# Patient Record
Sex: FEMALE | Race: WHITE | NOT HISPANIC OR LATINO | Employment: FULL TIME | URBAN - METROPOLITAN AREA
[De-identification: names, ages, dates, MRNs, and addresses within clinical notes are randomized per-mention and may not be internally consistent; named-entity substitution may affect disease eponyms.]

---

## 2017-09-07 ENCOUNTER — APPOINTMENT (OUTPATIENT)
Dept: LAB | Facility: CLINIC | Age: 57
End: 2017-09-07
Payer: COMMERCIAL

## 2017-09-07 ENCOUNTER — ALLSCRIPTS OFFICE VISIT (OUTPATIENT)
Dept: OTHER | Facility: OTHER | Age: 57
End: 2017-09-07

## 2017-09-07 ENCOUNTER — TRANSCRIBE ORDERS (OUTPATIENT)
Dept: LAB | Facility: CLINIC | Age: 57
End: 2017-09-07

## 2017-09-07 DIAGNOSIS — E03.9 HYPOTHYROIDISM: ICD-10-CM

## 2017-09-07 DIAGNOSIS — Z00.00 ROUTINE GENERAL MEDICAL EXAMINATION AT A HEALTH CARE FACILITY: Primary | ICD-10-CM

## 2017-09-07 LAB — TSH SERPL DL<=0.05 MIU/L-ACNC: 1.79 UIU/ML (ref 0.36–3.74)

## 2017-09-07 PROCEDURE — 84443 ASSAY THYROID STIM HORMONE: CPT

## 2017-09-07 PROCEDURE — 86803 HEPATITIS C AB TEST: CPT

## 2017-09-07 PROCEDURE — 36415 COLL VENOUS BLD VENIPUNCTURE: CPT

## 2017-09-08 LAB — HCV AB SER QL: NORMAL

## 2017-09-10 ENCOUNTER — GENERIC CONVERSION - ENCOUNTER (OUTPATIENT)
Dept: OTHER | Facility: OTHER | Age: 57
End: 2017-09-10

## 2017-11-26 ENCOUNTER — OFFICE VISIT (OUTPATIENT)
Dept: URGENT CARE | Facility: MEDICAL CENTER | Age: 57
End: 2017-11-26
Payer: COMMERCIAL

## 2017-11-26 PROCEDURE — S9083 URGENT CARE CENTER GLOBAL: HCPCS

## 2017-11-26 PROCEDURE — G0382 LEV 3 HOSP TYPE B ED VISIT: HCPCS

## 2017-11-27 NOTE — PROGRESS NOTES
Assessment    1  Subacromial bursitis of left shoulder joint (820 22) (F57 71)    Plan  Subacromial bursitis of left shoulder joint    · PredniSONE 20 MG Oral Tablet; TAKE 3 TABLETS DAILY FOR 3 DAYS, THEN 2TABLETS DAILY FOR 3 DAYS, THEN 1 TABLET DAILY FOR 3 DAYS    Discussion/Summary  Discussion Summary:   History and physical exam suggests left shoulder bursitis  Patient started on prednisone tapering from 60 milligram down to 20 milligrams over 9 days  I recommended patient alternate application of ice which he for 10-15 minutes 2 to 3 times a day  Continue using sling as needed  Periodically perform light stretching exercises and follow-up per primary care provider in 1 week if symptoms persist  She expressed understanding  Medication Side Effects Reviewed: Possible side effects of new medications were reviewed with the patient/guardian today  Understands and agrees with treatment plan: The treatment plan was reviewed with the patient/guardian  The patient/guardian understands and agrees with the treatment plan   Counseling Documentation With Imm: The patient was counseled regarding  Chief Complaint    1  Arm Pain  Chief Complaint Free Text Note Form: Pt states was reaching down to  something from floor 2 days when she felt a pulling sensation in left upper arm  Since then pain when moving left arm  Taking ibuprofen without improvement  History of Present Illness  HPI: Patient is a 65year old female with no significant PMH presenting with left shoulder pain for 3 days  States her left arm was extended while she was bent over reaching for an object when the pain was triggered  Pain is now a 7/10 shooting pain in her left shoulder and down the lateral aspect of her left arm, but not extending past the elbow  States the pain is avoided by keeping her arm still by flexing at the elbow and adducting the arm  Patient has kept the arm in a sling to avoid pain   Warm water from the shower improves the pain, but warm heat exacerbates the pain  Icing the arm and Advil has no effect on the pain  Had a similar episode of left shoulder pain 2-3 weeks ago while extending the arm for an object that relieved in 1 day spontaneously  Denies fevers, chills, redness, swelling, trauma to the arm, history of shoulder injury, history of blood clots, numbness or tingling  Patient has no history of overhead activities  Hospital Based Practices Required Assessment:  Pain Assessment  the patient states they have pain  The pain is located in the left upper arm  (on a scale of 0 to 10, the patient rates the pain at 9 )  Abuse And Domestic Violence Screen   Domestic violence screen not done today  Depression And Suicide Screen  Suicide screen not done today  Prefered Language is  english  Primary Language is  english  Arm Pain:  Associated symptoms include stiffness,-- decreased range of motion,-- weakness-- and-- joint catching, but-- no ecchymosis-- and-- no skin lesions  Manford Fillers presents with complaints of no elbow symptoms  Manford Fillers presents with complaints of no finger symptoms  no forearm symptoms no wrist symptoms      Review of Systems  Focused-Female:  Constitutional: no fever-- and-- no chills  Cardiovascular: no chest pain,-- no intermittent leg claudication-- and-- no lower extremity edema  Gastrointestinal: no nausea,-- no vomiting-- and-- no diarrhea  Musculoskeletal: myalgias-- and-- joint stiffness  Integumentary: no rashes  Neurological: no numbness-- and-- no tingling  Active Problems  1  Asthma (493 90) (J45 909)   2  Encounter for screening colonoscopy (V76 51) (Z12 11)   3  Fatigue (780 79) (R53 83)   4  History of allergy (V15 09) (Z88 9)   5  Hyperlipidemia (272 4) (E78 5)   6  Hypothyroidism (244 9) (E03 9)   7  Pap smear for cervical cancer screening (V76 2) (Z12 4)    Past Medical History  1  History of acute sinusitis (V12 69) (Z87 09)   2   History of dysphagia (V12 79) (Z87 19)   3  History of headache (V13 89) (Z87 898)   4  History of persistent cough (V12 69) (Z87 09)   5  History of Joint Pain In Both Hips (719 45)  Active Problems And Past Medical History Reviewed: The active problems and past medical history were reviewed and updated today  Family History  Mother    1  Family history of Dementia  Father    2  Family history of Aneurysm Of The Left Middle Cerebral Artery   3  Family history of Coronary Artery Disease (V17 49)   4  Family history of Essential Hypertension  Child    5  Family history of Emotional depression  Son    6  Family history of Asthma (V17 5)   7  Family history of Bipolar Disorder NOS  Sister    6  Family history of Asthma (V17 5)   9  Family history of malignant neoplasm of breast (V16 3) (Z80 3)  Brother    8  Family history of Hypertension (V17 49)  Paternal Grandmother    6  Family history of   Maternal Great Grandmother    15  Family history of    15  Family history of malignant neoplasm (V16 9) (Z80 9)  Paternal Grandfather    15  Family history of   Maternal Great Grandfather    13  Family history of    12  Family history of malignant neoplasm (V16 9) (Z80 9)    Social History   · Being A Social Drinker   · Caffeine Use   · Never A Smoker    Surgical History    1  History of Closed Treat Nasal Bone Fx W/ Manipulation & Stabilization   2  History of Dilation And Curettage   3  History of Exploratory Laparotomy   4  History of Extensor Tendon Repair (Each)   5  History of Nasal Septal Deviation Repair    Current Meds   1  Aleve 220 MG Oral Capsule; Take 1 capsule twice daily; Therapy: (Recorded:09Gnr4624) to Recorded   2  Fish Oil OIL; Therapy: (Recorded:64Xas4889) to Recorded   3  Levothyroxine Sodium 50 MCG Oral Tablet; take 1 tablet by mouth daily; Therapy: 23YDH1513 to (Evaluate:02Zfv7996)  Requested for: 68DZT7703; Last Rx:2017 Ordered   4   Levothyroxine Sodium SOLR; Therapy: (Recorded:14Weu7119) to Recorded   5  PriLOSEC OTC 20 MG Oral Tablet Delayed Release; Therapy: (Recorded:10Yha7025) to Recorded   6  Pulmicort Flexhaler 90 MCG/ACT Inhalation Aerosol Powder Breath Activated; INHALE 1 PUFFS Daily; Therapy: (Recorded:40Ldz8513) to Recorded   7  Vitamin D TABS; Take 1 tablet daily; Therapy: (Recorded:55Wds4311) to Recorded   8  ZyrTEC Allergy 10 MG Oral Capsule; take 1 capsule daily; Therapy: (Recorded:89Pgb6680) to Recorded  Medication List Reviewed: The medication list was reviewed and updated today  Allergies    1  Ancef SOLR   2  Penicillins   3  Sulfa Drugs    Vitals  Signs   Recorded: 82IHZ8210 02:33PM   Temperature: 97 1 F  Heart Rate: 68  Respiration: 20  Systolic: 617  Diastolic: 74  O2 Saturation: 99  Pain Scale: 9    Physical Exam   Constitutional  General appearance: No acute distress, well appearing and well nourished  Pulmonary  Respiratory effort: No increased work of breathing or signs of respiratory distress  Auscultation of lungs: Clear to auscultation  Cardiovascular  Auscultation of heart: Normal rate and rhythm, normal S1 and S2, without murmurs  Musculoskeletal  Digits and nails: Normal without clubbing or cyanosis  Inspection/palpation of joints, bones, and muscles: Abnormal  -- Severe decreased ROM of left shoulder, pain with palpation to anterior and posterior shoulder joint, normal ROM of the elbow, no tenderness to biceps or triceps  Skin  Skin and subcutaneous tissue: Normal without rashes or lesions  Neurologic  Sensation: No sensory loss         Future Appointments    Date/Time Provider Specialty Site   12/27/2017 10:00 AM Val Starr MD Family Medicine [facility]   03/08/2018 08:00 AM Val Starr MD Family Medicine [facility] [place] [facility]   Electronically signed by : NELSON Soria ; Nov 26 2017  3:40PM EST                       (Author)

## 2017-12-11 ENCOUNTER — TRANSCRIBE ORDERS (OUTPATIENT)
Dept: ADMINISTRATIVE | Facility: HOSPITAL | Age: 57
End: 2017-12-11

## 2017-12-11 DIAGNOSIS — Z12.31 VISIT FOR SCREENING MAMMOGRAM: Primary | ICD-10-CM

## 2018-01-11 ENCOUNTER — HOSPITAL ENCOUNTER (OUTPATIENT)
Dept: MAMMOGRAPHY | Facility: MEDICAL CENTER | Age: 58
Discharge: HOME | End: 2018-01-11
Payer: COMMERCIAL

## 2018-01-11 ENCOUNTER — GENERIC CONVERSION - ENCOUNTER (OUTPATIENT)
Dept: OTHER | Facility: OTHER | Age: 58
End: 2018-01-11

## 2018-01-11 DIAGNOSIS — Z12.31 VISIT FOR SCREENING MAMMOGRAM: ICD-10-CM

## 2018-01-11 PROCEDURE — 77067 SCR MAMMO BI INCL CAD: CPT

## 2018-01-13 NOTE — PROGRESS NOTES
Assessment    1  Encounter for preventive health examination (V70 0) (Z00 00)   2  Hypothyroidism (244 9) (E03 9)   3  Asthma (493 90) (J45 909)   4  Hyperlipidemia (272 4) (E78 5)    Plan  Health Maintenance    · (Q) HEPATITIS C ANTIBODY; Status:Active; Requested [facility]:[id number];   Hypothyroidism    · (1) TSH WITH FT4 REFLEX; Status:Resulted - Requires Verification;   Done: 27CFF7423  02:08PM    Discussion/Summary  health maintenance visit Currently, she eats a healthy diet and has an adequate exercise regimen  cervical cancer screening is current Breast cancer screening: mammogram is current  Colorectal cancer screening: colorectal cancer screening is current  Hepatitis C Screening:  The patient agrees to Hepatitis C screening  Adithya Urbano is here for initial visit and health maintenance physical exam  She has been quite healthy and has been doing much better ever since her right hip replacement May 2016  She is treated for hypothyroidism and I've given her a lab slip to check a TSH  She has a history of hyperlipidemia, but luckily she has an elevated HDL cholesterol as well  She had blood work in February 2017, and we will try to obtain these results  She has chronic allergic rhinitis and exercise-induced asthma  She we'll continue her follow-up with the allergist and use of her Pulmicort and Zyrtec  She is up-to-date with health maintenance exams  She refuses flu shot because she has had a history of severe allergic reactions to various antibiotics  We'll plan to recheck in 6 months  Chief Complaint  new pt physcial      History of Present Illness  HM, Adult Female: The patient is being seen for a health maintenance evaluation  The last health maintenance visit was 1 year(s) ago  General Health: The patient's health since the last visit is described as good  She has regular dental visits  She complains of vision problems  She denies hearing loss  Screening:   HPI: Pt is here as new patient   I used to see her in another office several years ago  PMH mildly elevated but high HDL 77  Hypothyroidism diagnosed Feb 2016, on 50 mcgs daily  Normal labs Feb 2017  Multiple seasonal allergies, worse spring and fall  Takes Zyrtec all year long  Dr Yocasta Cooper is her allergist  She chronic allergic rhinitis and exercise induced asthma  Pulmicort bid and albuterol before exercise  Lots of stress at work not knowing if her job will remain  She is taking Prilosec OTC now due to work stress  past 2 weeks feels uncomfortable left lower quadrant after BM  No blood in stool  Colonoscopy 4/15 had polyps removed due for next one 2018   1 son age 21  Right hip replacement 5/16, Dr Francisco Moore  Hx of injury to right hip  Lap and d and c 1992,  deviated septum repair 1983  left 5th finger volleyball injury to distal tendon 1989           Review of Systems    Constitutional: No fever, no chills, feels well, no tiredness, no recent weight gain or weight loss  Eyes: No complaints of eye pain, no red eyes, no eyesight problems, no discharge, no dry eyes, no itching of eyes  ENT: no complaints of earache, no loss of hearing, no nose bleeds, no nasal discharge, no sore throat, no hoarseness  Cardiovascular: No complaints of slow heart rate, no fast heart rate, no chest pain, no palpitations, no leg claudication, no lower extremity edema  Respiratory: cough, but no shortness of breath and no wheezing  Gastrointestinal: as noted in HPI  Genitourinary: No complaints of dysuria, no incontinence, no pelvic pain, no dysmenorrhea, no vaginal discharge or bleeding  Musculoskeletal: No complaints of arthralgias, no myalgias, no joint swelling or stiffness, no limb pain or swelling  Integumentary: No complaints of skin rash or lesions, no itching, no skin wounds, no breast pain or lump     Neurological: No complaints of headache, no confusion, no convulsions, no numbness, no dizziness or fainting, no tingling, no limb weakness, no difficulty walking  Psychiatric: Not suicidal, no sleep disturbance, no anxiety or depression, no change in personality, no emotional problems  Endocrine: hot flashes and menoapuse approx 1 1/2 yrs ago, rare hot flashes  Hematologic/Lymphatic: No complaints of swollen glands, no swollen glands in the neck, does not bleed easily, does not bruise easily  Active Problems    1  Asthma (493 90) (J45 909)   2  Fatigue (780 79) (R53 83)   3  History of allergy (V15 09) (Z88 9)   4   Hyperlipidemia (272 4) (E78 5)    Past Medical History    · History of acute sinusitis (V12 69) (Z87 09)   · History of dysphagia (V12 79) (Z87 19)   · History of headache (V13 89) (Z87 898)   · History of persistent cough (V12 69) (Z87 09)   · History of Joint Pain In Both Hips (719 45)    Surgical History    · History of Closed Treat Nasal Bone Fx W/ Manipulation & Stabilization   · History of Dilation And Curettage   · History of Exploratory Laparotomy   · History of Extensor Tendon Repair (Each)   · History of Nasal Septal Deviation Repair    Family History  Mother    · Family history of Dementia  Father    · Family history of Aneurysm Of The Left Middle Cerebral Artery   · Family history of Coronary Artery Disease (V17 49)   · Family history of Essential Hypertension  Child    · Family history of Emotional depression  Son    · Family history of Asthma (V17 5)   · Family history of Bipolar Disorder NOS  Sister    · Family history of Asthma (V17 5)   · Family history of malignant neoplasm of breast (V16 3) (Z80 3)  Brother    · Family history of Hypertension (V17 49)  Paternal Grandmother    · Family history of   Maternal Great Grandmother    · Family history of    · Family history of malignant neoplasm (V16 9) (Z80 9)  Paternal Grandfather    · Family history of   Maternal Great Grandfather    · Family history of    · Family history of malignant neoplasm (V16 9) (Z80 9)    Social History    · Being A Social Drinker   · Caffeine Use   · 1 20 OZ OF COFFEE   · Never A Smoker    Current Meds   1  Aleve 220 MG Oral Capsule; Take 1 capsule twice daily; Therapy: (Recorded:29Jul2013) to Recorded   2  Fish Oil OIL; Therapy: (Recorded:07Sep2017) to Recorded   3  Levothyroxine Sodium SOLR;   Therapy: (Recorded:07Sep2017) to Recorded   4  PriLOSEC OTC 20 MG Oral Tablet Delayed Release; Therapy: (Recorded:07Sep2017) to Recorded   5  Pulmicort Flexhaler 90 MCG/ACT Inhalation Aerosol Powder Breath Activated; INHALE 1   PUFFS Daily; Therapy: (Recorded:29Jul2013) to Recorded   6  Vitamin D TABS; Take 1 tablet daily; Therapy: (Recorded:29Jul2013) to Recorded   7  ZyrTEC Allergy 10 MG Oral Capsule; take 1 capsule daily; Therapy: (Recorded:29Jul2013) to Recorded    Allergies    1  Ancef SOLR   2  Penicillins   3  Sulfa Drugs    Vitals   Recorded: 07Sep2017 12:51PM   Heart Rate 92   Systolic 114   Diastolic 80   Height 5 ft 6 in   Weight 167 lb 0 8 oz   BMI Calculated 26 96   BSA Calculated 1 85     Physical Exam    Constitutional   General appearance: No acute distress, well appearing and well nourished  Head and Face   Head and face: Normal     Eyes   Conjunctiva and lids: No swelling, erythema or discharge  Ears, Nose, Mouth, and Throat   External inspection of ears and nose: Normal     Otoscopic examination: Tympanic membranes translucent with normal light reflex  Canals patent without erythema  Oropharynx: Normal with no erythema, edema, exudate or lesions  Neck   Neck: Supple, symmetric, trachea midline, no masses  Thyroid: Normal, no thyromegaly  Pulmonary   Respiratory effort: No increased work of breathing or signs of respiratory distress  Auscultation of lungs: Clear to auscultation  Cardiovascular   Auscultation of heart: Normal rate and rhythm, normal S1 and S2, no murmurs  Carotid pulses: 2+ bilaterally  Abdomen   Abdomen: Non-tender, no masses      Liver and spleen: No hepatomegaly or splenomegaly  Musculoskeletal   Gait and station: Normal     Skin   Skin and subcutaneous tissue: Normal without rashes or lesions  Neurologic   Cranial nerves: Cranial nerves II-XII intact  Cortical function: Normal mental status  Reflexes: 2+ and symmetric  Results/Data  (1) TSH WITH FT4 REFLEX 07Sep2017 02:08PM Osmin Wagner    Order Number: [id number]     Test Name Result Flag Reference   TSH 1 790 uIU/mL  0 358-3 740   Patients undergoing fluorescein dye angiography may retain small amounts of fluorescein in the body for 48-72 hours post procedure  Samples containing fluorescein can produce falsely depressed TSH values  If the patient had this procedure,a specimen should be resubmitted post fluorescein clearance  The recommended reference ranges for TSH during pregnancy are as follows:  First trimester 0 1 to 2 5 uIU/mL  Second trimester  0 2 to 3 0 uIU/mL  Third trimester 0 3 to 3 0 uIU/m     PHQ-2 Adult Depression Screening 07Sep2017 01:01PM Madie, Lala     Test Name Result Flag Reference   PHQ-2 Adult Depression Score 0     Over the last two weeks, how often have you been bothered by any of the following problems?   Little interest or pleasure in doing things: Not at all - 0  Feeling down, depressed, or hopeless: Not at all - 0   PHQ-2 Adult Depression Screening Negative         Future Appointments    Date/Time Provider Specialty Site   12/27/2017 10:00 AM Osmin Wagner MD Family Medicine [facility] 876   03/08/2018 08:00 AM Osmin Wagner MD Family Medicine [facility] 19   Electronically signed by : Rashida Sifuentes MD; Sep  7 2017  5:49PM EST                       (Author)

## 2018-01-16 NOTE — RESULT NOTES
Verified Results  (1) TSH WITH FT4 REFLEX 73Msd1612 02:08PM Kiarra Perdue    Order Number: [id number]     Test Name Result Flag Reference   TSH 1 790 uIU/mL  0 358-3 740   Patients undergoing fluorescein dye angiography may retain small amounts of fluorescein in the body for 48-72 hours post procedure  Samples containing fluorescein can produce falsely depressed TSH values  If the patient had this procedure,a specimen should be resubmitted post fluorescein clearance            The recommended reference ranges for TSH during pregnancy are as follows:  First trimester 0 1 to 2 5 uIU/mL  Second trimester  0 2 to 3 0 uIU/mL  Third trimester 0 3 to 3 0 uIU/m

## 2018-01-22 VITALS
WEIGHT: 167.05 LBS | HEART RATE: 92 BPM | HEIGHT: 66 IN | DIASTOLIC BLOOD PRESSURE: 80 MMHG | SYSTOLIC BLOOD PRESSURE: 100 MMHG | BODY MASS INDEX: 26.85 KG/M2

## 2018-01-30 DIAGNOSIS — E03.9 HYPOTHYROIDISM, UNSPECIFIED TYPE: Primary | ICD-10-CM

## 2018-01-30 RX ORDER — LEVOTHYROXINE SODIUM 0.05 MG/1
50 TABLET ORAL DAILY
Qty: 30 TABLET | Refills: 0 | Status: SHIPPED | OUTPATIENT
Start: 2018-01-30 | End: 2018-02-07 | Stop reason: SDUPTHER

## 2018-02-07 ENCOUNTER — OFFICE VISIT (OUTPATIENT)
Dept: FAMILY MEDICINE CLINIC | Facility: CLINIC | Age: 58
End: 2018-02-07
Payer: COMMERCIAL

## 2018-02-07 VITALS
RESPIRATION RATE: 18 BRPM | BODY MASS INDEX: 27.97 KG/M2 | WEIGHT: 174 LBS | HEART RATE: 74 BPM | SYSTOLIC BLOOD PRESSURE: 100 MMHG | DIASTOLIC BLOOD PRESSURE: 62 MMHG | HEIGHT: 66 IN | OXYGEN SATURATION: 97 %

## 2018-02-07 DIAGNOSIS — E78.01 FAMILIAL HYPERCHOLESTEROLEMIA: ICD-10-CM

## 2018-02-07 DIAGNOSIS — E03.9 HYPOTHYROIDISM, UNSPECIFIED TYPE: Primary | ICD-10-CM

## 2018-02-07 DIAGNOSIS — J45.30 MILD PERSISTENT ASTHMA WITHOUT COMPLICATION: ICD-10-CM

## 2018-02-07 DIAGNOSIS — K21.9 GERD WITHOUT ESOPHAGITIS: ICD-10-CM

## 2018-02-07 PROBLEM — M75.52 SUBACROMIAL BURSITIS OF LEFT SHOULDER JOINT: Status: ACTIVE | Noted: 2017-11-26

## 2018-02-07 PROCEDURE — 99214 OFFICE O/P EST MOD 30 MIN: CPT | Performed by: FAMILY MEDICINE

## 2018-02-07 RX ORDER — LEVOTHYROXINE SODIUM 0.05 MG/1
50 TABLET ORAL DAILY
Qty: 30 TABLET | Refills: 0 | Status: SHIPPED | OUTPATIENT
Start: 2018-02-07 | End: 2018-03-05 | Stop reason: SDUPTHER

## 2018-02-07 RX ORDER — ALBUTEROL SULFATE 1.25 MG/3ML
1 SOLUTION RESPIRATORY (INHALATION) EVERY 6 HOURS
COMMUNITY
End: 2018-03-05 | Stop reason: SDUPTHER

## 2018-02-07 NOTE — PROGRESS NOTES
Assessment/Plan:    Hyperlipidemia    Patient has a history of mildly elevated cholesterol but she also has elevated HDL cholesterol  We will send her for fasting labs as I do not have any in the old records  Hypothyroidism    Hypothyroidism has been stable  We will send her for TSH and free T4 reflex  GERD without esophagitis    She has history of discomfort in the upper abdomen which seems to be associated with stress  She has been taking daily omeprazole  We discussed having her decrease to as needed omeprazole or switching to 1 of the H2 blockers over-the-counter such as Zantac or Pepcid  Asthma    Asthma has been stable on her  Chronic inhaler  She will continue with yearly visits to her allergist        Diagnoses and all orders for this visit:    Hypothyroidism, unspecified type  -     levothyroxine 50 mcg tablet; Take 1 tablet (50 mcg total) by mouth daily  -     TSH, 3rd generation with T4 reflex; Future    Familial hypercholesterolemia  -     Lipid panel; Future  -     Comprehensive metabolic panel; Future    GERD without esophagitis    Mild persistent asthma without complication    Other orders  -     Discontinue: aspirin 81 MG tablet; Take 81 mg by mouth  -     PROAIR  (90 Base) MCG/ACT inhaler;   -     albuterol (ACCUNEB) 1 25 MG/3ML nebulizer solution; Inhale 1 ampule every 6 (six) hours          Subjective:   Chief Complaint   Patient presents with    Follow-up     6Month        Patient ID: Sandra Friend is a 65 y o  female  Diana Renee is here for routine follow-up visit  She feels stable on her current dose of Synthroid  She tends to get acid stomach symptoms with recurrent pain in the upper abdomen  but she does not have heartburn  or reflux  She notes the symptoms are related to stress, and when she was on vacation recently she did not need any medication    She has been taking daily omeprazole which works,   But she is concerned about remaining on it on a daily basis     Her allergies and asthma have  Been under excellent control  She has not needed rescue inhaler recently  She continues with yearly trips to the allergist           The following portions of the patient's history were reviewed and updated as appropriate: allergies, current medications, past family history, past medical history, past social history, past surgical history and problem list     Review of Systems   Constitutional: Negative for activity change, chills, fatigue and fever  HENT: Negative for congestion, ear pain, sinus pressure and sore throat  Eyes: Negative for pain and visual disturbance  Respiratory: Negative for cough, chest tightness, shortness of breath and wheezing  Cardiovascular: Negative for chest pain, palpitations and leg swelling  Gastrointestinal: Negative for abdominal pain, blood in stool, constipation, diarrhea, nausea and vomiting  Upper abd discomfort    Endocrine: Negative for polydipsia and polyuria  Genitourinary: Negative for difficulty urinating, dysuria, frequency and urgency  Musculoskeletal: Negative for arthralgias, joint swelling and myalgias  Skin: Negative for rash  Neurological: Negative for dizziness, weakness, numbness and headaches  Hematological: Negative for adenopathy  Does not bruise/bleed easily  Psychiatric/Behavioral: Negative for dysphoric mood  The patient is not nervous/anxious  Objective:     Physical Exam   Constitutional: She is oriented to person, place, and time  She appears well-developed and well-nourished  No distress  HENT:   Head: Normocephalic and atraumatic  Right Ear: External ear normal    Left Ear: External ear normal    Nose: Nose normal    Mouth/Throat: Oropharynx is clear and moist    Eyes: Conjunctivae and EOM are normal  Pupils are equal, round, and reactive to light  No scleral icterus  Neck: Normal range of motion  Neck supple  No thyromegaly present     Cardiovascular: Normal rate, regular rhythm and normal heart sounds  No murmur heard  Pulmonary/Chest: Effort normal and breath sounds normal  She has no wheezes  She has no rales  Abdominal: Soft  Bowel sounds are normal  She exhibits no mass  There is no tenderness  Musculoskeletal: She exhibits no tenderness or deformity  Lymphadenopathy:     She has no cervical adenopathy  Neurological: She is alert and oriented to person, place, and time  She has normal reflexes  No cranial nerve deficit  Skin: Skin is warm and dry  No rash noted  No erythema  Psychiatric: She has a normal mood and affect  Her behavior is normal    Nursing note and vitals reviewed

## 2018-02-07 NOTE — ASSESSMENT & PLAN NOTE
Patient has a history of mildly elevated cholesterol but she also has elevated HDL cholesterol  We will send her for fasting labs as I do not have any in the old records

## 2018-02-07 NOTE — ASSESSMENT & PLAN NOTE
Asthma has been stable on her  Chronic inhaler    She will continue with yearly visits to her allergist

## 2018-02-14 ENCOUNTER — APPOINTMENT (OUTPATIENT)
Dept: LAB | Facility: CLINIC | Age: 58
End: 2018-02-14
Payer: COMMERCIAL

## 2018-02-14 ENCOUNTER — TRANSCRIBE ORDERS (OUTPATIENT)
Dept: LAB | Facility: CLINIC | Age: 58
End: 2018-02-14

## 2018-02-14 DIAGNOSIS — E03.9 HYPOTHYROIDISM, UNSPECIFIED TYPE: ICD-10-CM

## 2018-02-14 DIAGNOSIS — E78.01 FAMILIAL HYPERCHOLESTEROLEMIA: ICD-10-CM

## 2018-02-14 LAB
ALBUMIN SERPL BCP-MCNC: 4 G/DL (ref 3.5–5)
ALP SERPL-CCNC: 91 U/L (ref 46–116)
ALT SERPL W P-5'-P-CCNC: 39 U/L (ref 12–78)
ANION GAP SERPL CALCULATED.3IONS-SCNC: 5 MMOL/L (ref 4–13)
AST SERPL W P-5'-P-CCNC: 20 U/L (ref 5–45)
BILIRUB SERPL-MCNC: 0.72 MG/DL (ref 0.2–1)
BUN SERPL-MCNC: 10 MG/DL (ref 5–25)
CALCIUM SERPL-MCNC: 8.6 MG/DL (ref 8.3–10.1)
CHLORIDE SERPL-SCNC: 107 MMOL/L (ref 100–108)
CHOLEST SERPL-MCNC: 204 MG/DL (ref 50–200)
CO2 SERPL-SCNC: 30 MMOL/L (ref 21–32)
CREAT SERPL-MCNC: 0.64 MG/DL (ref 0.6–1.3)
GFR SERPL CREATININE-BSD FRML MDRD: 100 ML/MIN/1.73SQ M
GLUCOSE P FAST SERPL-MCNC: 93 MG/DL (ref 65–99)
HDLC SERPL-MCNC: 67 MG/DL (ref 40–60)
LDLC SERPL CALC-MCNC: 129 MG/DL (ref 0–100)
POTASSIUM SERPL-SCNC: 4.3 MMOL/L (ref 3.5–5.3)
PROT SERPL-MCNC: 7.4 G/DL (ref 6.4–8.2)
SODIUM SERPL-SCNC: 142 MMOL/L (ref 136–145)
TRIGL SERPL-MCNC: 38 MG/DL
TSH SERPL DL<=0.05 MIU/L-ACNC: 1.19 UIU/ML (ref 0.36–3.74)

## 2018-02-14 PROCEDURE — 80061 LIPID PANEL: CPT

## 2018-02-14 PROCEDURE — 36415 COLL VENOUS BLD VENIPUNCTURE: CPT

## 2018-02-14 PROCEDURE — 84443 ASSAY THYROID STIM HORMONE: CPT

## 2018-02-14 PROCEDURE — 80053 COMPREHEN METABOLIC PANEL: CPT

## 2018-03-03 ENCOUNTER — OFFICE VISIT (OUTPATIENT)
Dept: URGENT CARE | Facility: CLINIC | Age: 58
End: 2018-03-03
Payer: COMMERCIAL

## 2018-03-03 ENCOUNTER — APPOINTMENT (OUTPATIENT)
Dept: RADIOLOGY | Facility: CLINIC | Age: 58
End: 2018-03-03
Payer: COMMERCIAL

## 2018-03-03 VITALS
DIASTOLIC BLOOD PRESSURE: 74 MMHG | HEIGHT: 66 IN | WEIGHT: 170 LBS | HEART RATE: 70 BPM | TEMPERATURE: 99.5 F | BODY MASS INDEX: 27.32 KG/M2 | OXYGEN SATURATION: 96 % | SYSTOLIC BLOOD PRESSURE: 108 MMHG

## 2018-03-03 DIAGNOSIS — R68.89 FLU-LIKE SYMPTOMS: Primary | ICD-10-CM

## 2018-03-03 PROCEDURE — 71046 X-RAY EXAM CHEST 2 VIEWS: CPT

## 2018-03-03 PROCEDURE — 99203 OFFICE O/P NEW LOW 30 MIN: CPT

## 2018-03-03 RX ORDER — OSELTAMIVIR PHOSPHATE 75 MG/1
75 CAPSULE ORAL 2 TIMES DAILY
Qty: 10 CAPSULE | Refills: 0 | Status: SHIPPED | OUTPATIENT
Start: 2018-03-03 | End: 2018-03-08

## 2018-03-03 RX ORDER — METHYLPREDNISOLONE 4 MG/1
TABLET ORAL
Qty: 21 TABLET | Refills: 0 | Status: SHIPPED | OUTPATIENT
Start: 2018-03-03 | End: 2018-03-08

## 2018-03-05 DIAGNOSIS — E03.9 HYPOTHYROIDISM, UNSPECIFIED TYPE: ICD-10-CM

## 2018-03-05 DIAGNOSIS — J45.20 MILD INTERMITTENT ASTHMA WITHOUT COMPLICATION: Primary | ICD-10-CM

## 2018-03-05 RX ORDER — ALBUTEROL SULFATE 1.25 MG/3ML
SOLUTION RESPIRATORY (INHALATION)
Qty: 75 ML | Refills: 3 | Status: SHIPPED | OUTPATIENT
Start: 2018-03-05 | End: 2019-03-20 | Stop reason: SDUPTHER

## 2018-03-05 RX ORDER — LEVOTHYROXINE SODIUM 0.05 MG/1
50 TABLET ORAL DAILY
Qty: 30 TABLET | Refills: 5 | Status: SHIPPED | OUTPATIENT
Start: 2018-03-05 | End: 2018-09-12 | Stop reason: SDUPTHER

## 2018-03-05 NOTE — PROGRESS NOTES
[facility]        NAME: Loida Thurman is a 65 y o  female  : 1960    MRN: [id number]  DATE: 2018  TIME: 9:00 AM    Assessment and Plan   Flu-like symptoms [R68 89]  1  Flu-like symptoms  X-ray chest 2 views    oseltamivir (TAMIFLU) 75 mg capsule    Methylprednisolone 4 MG TBPK         Patient Instructions     Take tamiflu and steroid as prescribed  Follow up with PCP in 3-5 days  Proceed to  ER if symptoms worsen  Chief Complaint     Chief Complaint   Patient presents with    Cough     x3-4 days      History of Present Illness       Cough   This is a new problem  The current episode started yesterday  The problem has been unchanged  The problem occurs constantly  The cough is non-productive  Associated symptoms include a fever, myalgias, nasal congestion, postnasal drip, rhinorrhea and a sore throat  Pertinent negatives include no chest pain, chills, ear congestion, ear pain, headaches, heartburn, hemoptysis, rash, shortness of breath, sweats, weight loss or wheezing  Nothing aggravates the symptoms  She has tried nothing for the symptoms  The treatment provided mild relief  There is no history of asthma, bronchiectasis, bronchitis, COPD, emphysema, environmental allergies or pneumonia  Review of Systems   Review of Systems   Constitutional: Positive for fatigue and fever  Negative for activity change, appetite change, chills, diaphoresis, unexpected weight change and weight loss  HENT: Positive for congestion, postnasal drip, rhinorrhea, sinus pain, sinus pressure, sneezing and sore throat  Negative for dental problem, drooling, ear discharge, ear pain, facial swelling, hearing loss, mouth sores, nosebleeds, tinnitus, trouble swallowing and voice change  Eyes: Negative  Respiratory: Positive for cough and chest tightness  Negative for apnea, hemoptysis, choking, shortness of breath, wheezing and stridor      Cardiovascular: Negative for chest pain, palpitations and leg swelling  Gastrointestinal: Negative for heartburn  Musculoskeletal: Positive for myalgias  Skin: Negative for rash  Allergic/Immunologic: Negative for environmental allergies  Neurological: Negative for headaches           Current Medications       Current Outpatient Prescriptions:     albuterol (ACCUNEB) 1 25 MG/3ML nebulizer solution, Inhale 1 ampule every 6 (six) hours, Disp: , Rfl:     budesonide (PULMICORT FLEXHALER) 90 MCG/ACT inhaler, Inhale 1 puff daily, Disp: , Rfl:     Cetirizine HCl (ZYRTEC ALLERGY) 10 MG CAPS, Take 1 capsule by mouth daily, Disp: , Rfl:     cholecalciferol (VITAMIN D3) 1,000 units tablet, Take 1 tablet by mouth daily, Disp: , Rfl:     FISH OIL-KRILL OIL PO, by Does not apply route, Disp: , Rfl:     levothyroxine 50 mcg tablet, Take 1 tablet (50 mcg total) by mouth daily, Disp: 30 tablet, Rfl: 0    omeprazole (PRILOSEC OTC) 20 MG tablet, Take by mouth, Disp: , Rfl:     PROAIR  (90 Base) MCG/ACT inhaler, , Disp: , Rfl:     Methylprednisolone 4 MG TBPK, Use as directed on package, Disp: 21 tablet, Rfl: 0    oseltamivir (TAMIFLU) 75 mg capsule, Take 1 capsule (75 mg total) by mouth 2 (two) times a day for 5 days, Disp: 10 capsule, Rfl: 0    Current Allergies     Allergies as of 03/03/2018 - Reviewed 03/03/2018   Allergen Reaction Noted    Cefazolin  09/23/2013    Penicillins  07/29/2013    Sulfa antibiotics  07/29/2013            The following portions of the patient's history were reviewed and updated as appropriate: allergies, current medications, past family history, past medical history, past social history, past surgical history and problem list      Past Medical History:   Diagnosis Date    History of acute sinusitis     History of dysphagia     History of headache     new onset    History of persistent cough     Pain of both hip joints        Past Surgical History:   Procedure Laterality Date    DILATION AND CURETTAGE OF UTERUS      EXPLORATORY LAPAROTOMY      NASAL SEPTUM SURGERY      Nasal Septal Deviation Repair    NOSE SURGERY      Closed Treat Nasal Bone Fx w/ Manipulation Stabilization    REPAIR EXTENSOR TENDON HAND         Family History   Problem Relation Age of Onset    Dementia Mother     Aneurysm Father      of the Left Middle Cerebral Artery    Coronary artery disease Father     Hypertension Father     Asthma Sister     Breast cancer Sister     Hypertension Brother     Asthma Son 23    Bipolar disorder Son      NOS    Cancer Family     Depression Child      Emotional Depression         Medications have been verified  Objective   /74 (BP Location: Left arm, Patient Position: Sitting, Cuff Size: Adult)   Pulse 70   Temp 99 5 °F (37 5 °C) (Tympanic)   Ht 5' 6" (1 676 m)   Wt 77 1 kg (170 lb)   SpO2 96%   BMI 27 44 kg/m²        Physical Exam     Physical Exam   Constitutional: She appears well-developed and well-nourished  She has a sickly appearance  HENT:   Head: Normocephalic  Right Ear: Hearing, tympanic membrane, external ear and ear canal normal    Left Ear: Hearing, tympanic membrane, external ear and ear canal normal    Nose: Mucosal edema and rhinorrhea (clear) present  Mouth/Throat: Posterior oropharyngeal erythema present  No oropharyngeal exudate or posterior oropharyngeal edema  Cardiovascular: Normal rate, regular rhythm and intact distal pulses  Exam reveals no gallop and no friction rub  No murmur heard  Pulmonary/Chest: Effort normal and breath sounds normal  No respiratory distress  She has no wheezes  She has no rales  She exhibits no tenderness  Abdominal: Soft  Bowel sounds are normal  She exhibits no distension and no mass  There is no tenderness  There is no rebound and no guarding  Lymphadenopathy:     She has cervical adenopathy  Right cervical: Superficial cervical adenopathy present  Left cervical: Superficial cervical adenopathy present

## 2018-03-08 ENCOUNTER — OFFICE VISIT (OUTPATIENT)
Dept: FAMILY MEDICINE CLINIC | Facility: CLINIC | Age: 58
End: 2018-03-08
Payer: COMMERCIAL

## 2018-03-08 VITALS
TEMPERATURE: 99.2 F | SYSTOLIC BLOOD PRESSURE: 114 MMHG | HEART RATE: 88 BPM | BODY MASS INDEX: 27.32 KG/M2 | WEIGHT: 170 LBS | HEIGHT: 66 IN | DIASTOLIC BLOOD PRESSURE: 80 MMHG | OXYGEN SATURATION: 93 %

## 2018-03-08 DIAGNOSIS — J40 BRONCHITIS: Primary | ICD-10-CM

## 2018-03-08 PROCEDURE — 99213 OFFICE O/P EST LOW 20 MIN: CPT | Performed by: FAMILY MEDICINE

## 2018-03-08 RX ORDER — BENZONATATE 200 MG/1
200 CAPSULE ORAL 3 TIMES DAILY PRN
Qty: 20 CAPSULE | Refills: 0 | Status: SHIPPED | OUTPATIENT
Start: 2018-03-08 | End: 2018-03-15

## 2018-03-08 RX ORDER — AZITHROMYCIN 500 MG/1
TABLET, FILM COATED ORAL
Refills: 1 | COMMUNITY
Start: 2018-02-12 | End: 2018-03-08

## 2018-03-08 RX ORDER — CLINDAMYCIN HYDROCHLORIDE 150 MG/1
CAPSULE ORAL
Refills: 0 | COMMUNITY
Start: 2018-02-12 | End: 2018-03-08

## 2018-03-08 RX ORDER — AZITHROMYCIN 250 MG/1
TABLET, FILM COATED ORAL
Qty: 6 TABLET | Refills: 0 | Status: SHIPPED | OUTPATIENT
Start: 2018-03-08 | End: 2018-03-12

## 2018-03-08 NOTE — PATIENT INSTRUCTIONS
Chantal Gilliland is here with persistent bronchitis symptoms  She already has had a prednisone taper which has not helped eradicating the symptoms  I have given her prescription for a Z-Charly and Tessalon Perles which she may use up to 3 times per day  Also recommended increased fluid intake  She should contact us if symptoms are not improving

## 2018-03-08 NOTE — PROGRESS NOTES
Assessment/Plan:      Bee Cowan is here with persistent bronchitis symptoms  She already has had a prednisone taper which has not helped eradicating the symptoms  I have given her prescription for a Z-Charly and Tessalon Perles which she may use up to 3 times per day  Also recommended increased fluid intake  She should contact us if symptoms are not improving  No problem-specific Assessment & Plan notes found for this encounter  Diagnoses and all orders for this visit:    Bronchitis    Other orders  -     Discontinue: clindamycin (CLEOCIN) 150 mg capsule; TK 1 C PO Q 8 H TAT  -     Discontinue: azithromycin (ZITHROMAX) 500 MG tablet; TK 1 T PO 1 HOUR PRIOR TO APPOINTMENT          Subjective:   Chief Complaint   Patient presents with    Cold Like Symptoms        Patient ID: Munira Oseguera is a 65 y o  female  She has been coughing for over a week  The cough has been sometimes dry and sometimes productive  It has been keeping her awake at night  She went to urgent care on March 3rd and was given steroid taper  She has also been using neb treatments    Unfortunately symptoms are not improving  She did have chest x-ray at urgent care which was normal       Cough   This is a new problem  The current episode started 1 to 4 weeks ago  The problem has been gradually worsening  Associated symptoms include chest pain  Pertinent negatives include no chills, ear pain, fever, myalgias, rash, sore throat, shortness of breath or wheezing  She has tried oral steroids for the symptoms  The treatment provided no relief  Her past medical history is significant for asthma  The following portions of the patient's history were reviewed and updated as appropriate: allergies, current medications, past family history, past medical history, past social history, past surgical history and problem list     Review of Systems   Constitutional: Positive for fatigue  Negative for activity change, chills and fever     HENT: Negative for congestion, ear pain, sinus pressure and sore throat  Eyes: Negative for pain and visual disturbance  Respiratory: Positive for cough  Negative for chest tightness, shortness of breath and wheezing  Cardiovascular: Positive for chest pain  Negative for palpitations and leg swelling  Gastrointestinal: Negative for abdominal pain, blood in stool, constipation, diarrhea, nausea and vomiting  Endocrine: Negative for polydipsia and polyuria  Genitourinary: Negative for difficulty urinating, dysuria, frequency and urgency  Musculoskeletal: Negative for arthralgias, joint swelling and myalgias  Skin: Negative for rash  Neurological: Negative for dizziness, weakness and numbness  Hematological: Negative for adenopathy  Does not bruise/bleed easily  Psychiatric/Behavioral: Negative for dysphoric mood  The patient is not nervous/anxious  Objective:      /80 (BP Location: Left arm, Patient Position: Sitting, Cuff Size: Large)   Pulse 88   Temp 99 2 °F (37 3 °C)   Ht 5' 6" (1 676 m)   Wt 77 1 kg (170 lb)   SpO2 93%   BMI 27 44 kg/m²          Physical Exam   Nursing note and vitals reviewed

## 2018-03-08 NOTE — LETTER
March 8, 2018     Patient: Jourdan Ariza   YOB: 1960   Date of Visit: 3/8/2018       To Whom it May Concern:    Agustin Sweeney is under my professional care  She was seen in my office on 3/8/2018  She may return to work on 3/12/2018  If you have any questions or concerns, please don't hesitate to call           Sincerely,          Celestine Rivera MD        CC: No Recipients

## 2018-03-15 ENCOUNTER — OFFICE VISIT (OUTPATIENT)
Dept: FAMILY MEDICINE CLINIC | Facility: CLINIC | Age: 58
End: 2018-03-15
Payer: COMMERCIAL

## 2018-03-15 VITALS
TEMPERATURE: 98.1 F | HEIGHT: 66 IN | DIASTOLIC BLOOD PRESSURE: 66 MMHG | SYSTOLIC BLOOD PRESSURE: 100 MMHG | OXYGEN SATURATION: 96 % | HEART RATE: 83 BPM | WEIGHT: 171.6 LBS | BODY MASS INDEX: 27.58 KG/M2

## 2018-03-15 DIAGNOSIS — R05.3 COUGH, PERSISTENT: Primary | ICD-10-CM

## 2018-03-15 PROCEDURE — 3008F BODY MASS INDEX DOCD: CPT | Performed by: FAMILY MEDICINE

## 2018-03-15 PROCEDURE — 99213 OFFICE O/P EST LOW 20 MIN: CPT | Performed by: FAMILY MEDICINE

## 2018-03-15 RX ORDER — PREDNISONE 10 MG/1
TABLET ORAL
Qty: 20 TABLET | Refills: 0 | Status: SHIPPED | OUTPATIENT
Start: 2018-03-15 | End: 2018-09-12

## 2018-03-15 RX ORDER — PROMETHAZINE HYDROCHLORIDE AND CODEINE PHOSPHATE 6.25; 1 MG/5ML; MG/5ML
5 SYRUP ORAL EVERY 4 HOURS PRN
Qty: 120 ML | Refills: 0 | Status: SHIPPED | OUTPATIENT
Start: 2018-03-15 | End: 2018-09-12

## 2018-03-15 RX ORDER — BENZONATATE 200 MG/1
200 CAPSULE ORAL 3 TIMES DAILY PRN
Qty: 20 CAPSULE | Refills: 0 | Status: SHIPPED | OUTPATIENT
Start: 2018-03-15 | End: 2018-09-12

## 2018-03-15 NOTE — LETTER
March 15, 2018     Patient: Marcella Cook   YOB: 1960   Date of Visit: 3/15/2018       To Whom it May Concern:    Crystal White is under my professional care  She was seen in my office on 3/15/2018  She may return to work on 3/19/2018  If you have any questions or concerns, please don't hesitate to call           Sincerely,          Chirag Jackson MD        CC: No Recipients

## 2018-03-15 NOTE — PATIENT INSTRUCTIONS
Will treat with Tessalon Perles for daytime coughing and promethazine with codeine for night cough  I also gave her a printed prescription for another prednisone taper  If symptoms persist she may fill this  Encouraged increased water intake  She should contact us if symptoms are not resolving

## 2018-03-15 NOTE — PROGRESS NOTES
Assessment/Plan:     Will treat with Tessalon Perles for daytime coughing and promethazine with codeine for night cough  I also gave her a printed prescription for another prednisone taper  If symptoms persist she may fill this  Encouraged increased water intake  She should contact us if symptoms are not resolving  No problem-specific Assessment & Plan notes found for this encounter  There are no diagnoses linked to this encounter  Subjective:      Patient ID: Munira Oseguera is a 65 y o  female  She was here 1 week ago with persistent cough and I gave her Z-fatemeh  She did not feel like it helped  Cough is dry and painful and persistent, day and night  Perles helped but she is out of them  Extreme fatigue is better but she is still tired from coughing      Cough   Pertinent negatives include no chest pain, chills, ear pain, fever, headaches, myalgias, rash, sore throat, shortness of breath or wheezing  The following portions of the patient's history were reviewed and updated as appropriate: allergies, current medications, past family history, past medical history, past social history, past surgical history and problem list     Review of Systems   Constitutional: Negative for activity change, chills, fatigue and fever  HENT: Negative for congestion, ear pain, sinus pressure and sore throat  Eyes: Negative for pain and visual disturbance  Respiratory: Positive for cough  Negative for chest tightness, shortness of breath and wheezing  Cardiovascular: Negative for chest pain, palpitations and leg swelling  Gastrointestinal: Negative for abdominal pain, blood in stool, constipation, diarrhea, nausea and vomiting  Endocrine: Negative for polydipsia and polyuria  Genitourinary: Negative for difficulty urinating, dysuria, frequency and urgency  Musculoskeletal: Negative for arthralgias, joint swelling and myalgias  Skin: Negative for rash     Neurological: Negative for dizziness, weakness, numbness and headaches  Hematological: Negative for adenopathy  Does not bruise/bleed easily  Psychiatric/Behavioral: Negative for dysphoric mood  The patient is not nervous/anxious  Objective:      /66 (BP Location: Left arm, Patient Position: Sitting, Cuff Size: Large)   Pulse 83   Temp 98 1 °F (36 7 °C)   Ht 5' 6" (1 676 m)   Wt 77 8 kg (171 lb 9 6 oz)   SpO2 96%   BMI 27 70 kg/m²          Physical Exam   Constitutional: She appears well-developed and well-nourished  HENT:   Head: Normocephalic and atraumatic  Right Ear: External ear normal    Left Ear: External ear normal    Neck: Normal range of motion  Neck supple  No thyromegaly present  Cardiovascular: Normal rate and regular rhythm  Pulmonary/Chest: Effort normal and breath sounds normal    Abdominal: Soft  Lymphadenopathy:     She has no cervical adenopathy  Nursing note and vitals reviewed

## 2018-07-17 ENCOUNTER — TELEPHONE (OUTPATIENT)
Dept: FAMILY MEDICINE CLINIC | Facility: CLINIC | Age: 58
End: 2018-07-17

## 2018-07-17 NOTE — TELEPHONE ENCOUNTER
Pt called stating that she was supposed to have b/w completed but misplaced her lab slips and requested to have them reprinted  I told her that was fine but when I went to print them realized there are no pending labs  Are there labs  You wanted pt to get at this time?

## 2018-08-14 ENCOUNTER — TELEPHONE (OUTPATIENT)
Dept: FAMILY MEDICINE CLINIC | Facility: CLINIC | Age: 58
End: 2018-08-14

## 2018-08-14 NOTE — TELEPHONE ENCOUNTER
Dr Elias Richardson informed me that there is a pediatric psychiatrist named Dr Rena Thompson at [facility] in [place]

## 2018-08-14 NOTE — TELEPHONE ENCOUNTER
Pt called asking if there is someone in particular you could recommend as a psychiatrist for her son as Dr Ana Martines is no longer seeing pediatric patients

## 2018-08-15 NOTE — TELEPHONE ENCOUNTER
RITIKA to call back regarding recommendation  Physician name is Dr Iraida Pardo with [address] pediatric [facility]  Phone number is [phone]

## 2018-09-12 ENCOUNTER — OFFICE VISIT (OUTPATIENT)
Dept: FAMILY MEDICINE CLINIC | Facility: CLINIC | Age: 58
End: 2018-09-12
Payer: COMMERCIAL

## 2018-09-12 VITALS
HEART RATE: 70 BPM | SYSTOLIC BLOOD PRESSURE: 104 MMHG | TEMPERATURE: 98.2 F | DIASTOLIC BLOOD PRESSURE: 70 MMHG | OXYGEN SATURATION: 98 % | WEIGHT: 172.2 LBS | HEIGHT: 67 IN | BODY MASS INDEX: 27.03 KG/M2

## 2018-09-12 DIAGNOSIS — E78.01 FAMILIAL HYPERCHOLESTEROLEMIA: ICD-10-CM

## 2018-09-12 DIAGNOSIS — E03.9 HYPOTHYROIDISM, UNSPECIFIED TYPE: ICD-10-CM

## 2018-09-12 DIAGNOSIS — K21.9 GERD WITHOUT ESOPHAGITIS: ICD-10-CM

## 2018-09-12 DIAGNOSIS — J45.30 MILD PERSISTENT ASTHMA WITHOUT COMPLICATION: ICD-10-CM

## 2018-09-12 DIAGNOSIS — Z00.00 ROUTINE HEALTH MAINTENANCE: Primary | ICD-10-CM

## 2018-09-12 PROCEDURE — 99396 PREV VISIT EST AGE 40-64: CPT | Performed by: FAMILY MEDICINE

## 2018-09-12 RX ORDER — LEVOTHYROXINE SODIUM 0.05 MG/1
TABLET ORAL
Qty: 30 TABLET | Refills: 0 | Status: SHIPPED | OUTPATIENT
Start: 2018-09-12 | End: 2018-10-09 | Stop reason: SDUPTHER

## 2018-09-12 NOTE — PROGRESS NOTES
Assessment/Plan:      She had normal health maintenance exam   She is up-to-date with preventive screens  She refuses the flu vaccine  She is due for tetanus vaccine  She would prefer waiting until after her detention in October to receive this vaccine  Asthma  She will continue with the current regimen  Asthma has been stable  Hyperlipidemia    We discussed lipid profile  Although total is mildly elevated, she has a good HDL level  Will plan to recheck it again in 2019  GERD without esophagitis   She remains on omeprazole currently  She has a stressful couple of weeks coming up so she might increase it to twice per day, then she will try to taper down off of it  Hypothyroidism    Thyroid has been very stable on 50 mcg of Synthroid daily  We will plan to recheck labs at next visit in 6 months  Diagnoses and all orders for this visit:    Routine health maintenance    Mild persistent asthma without complication    Familial hypercholesterolemia    GERD without esophagitis    Hypothyroidism, unspecified type          Subjective:      Patient ID: Paulo Batres is a 63 y o  female  Here for   Diet is healthy  wlks for exercise  UTD with dentist  Colonoscopy 2015 but due again because of polyps  UTD with mamm  Feels OK with thyroid  She is under stress currently last few weeks of work  She is retiring October 1st   She wants to increase her omeprazole to 2 pills per day for the next couple weeks  Frequent allergies and occasional asthma  Has not needed rescue inhaler         The following portions of the patient's history were reviewed and updated as appropriate: allergies, current medications, past family history, past medical history, past social history, past surgical history and problem list     Review of Systems   Constitutional: Negative for activity change, chills, fatigue and fever  HENT: Negative for congestion, ear pain, sinus pressure and sore throat      Eyes: Negative for pain and visual disturbance  Respiratory: Negative for cough, chest tightness, shortness of breath and wheezing  Cardiovascular: Negative for chest pain, palpitations and leg swelling  Gastrointestinal: Negative for abdominal pain, blood in stool, constipation, diarrhea, nausea and vomiting  Endocrine: Negative for polydipsia and polyuria  Genitourinary: Negative for difficulty urinating, dysuria, frequency and urgency  Musculoskeletal: Negative for arthralgias, joint swelling and myalgias  Skin: Negative for rash  Neurological: Negative for dizziness, weakness, numbness and headaches  Hematological: Negative for adenopathy  Does not bruise/bleed easily  Psychiatric/Behavioral: Negative for dysphoric mood  The patient is not nervous/anxious  Objective:      /70 (BP Location: Left arm, Patient Position: Sitting, Cuff Size: Standard)   Pulse 70   Temp 98 2 °F (36 8 °C) (Tympanic)   Ht 5' 6 5" (1 689 m)   Wt 78 1 kg (172 lb 3 2 oz)   SpO2 98%   BMI 27 38 kg/m²          Physical Exam   Constitutional: She is oriented to person, place, and time  She appears well-developed and well-nourished  No distress  HENT:   Head: Normocephalic and atraumatic  Right Ear: External ear normal    Left Ear: External ear normal    Nose: Nose normal    Mouth/Throat: Oropharynx is clear and moist    Eyes: Conjunctivae and EOM are normal  Pupils are equal, round, and reactive to light  No scleral icterus  Neck: Normal range of motion  Neck supple  No thyromegaly present  Cardiovascular: Normal rate, regular rhythm and normal heart sounds  No murmur heard  Pulmonary/Chest: Effort normal and breath sounds normal  She has no wheezes  She has no rales  Abdominal: Soft  Bowel sounds are normal  She exhibits no mass  There is no tenderness  Musculoskeletal: She exhibits no tenderness or deformity  Lymphadenopathy:     She has no cervical adenopathy     Neurological: She is alert and oriented to person, place, and time  She has normal reflexes  No cranial nerve deficit  Skin: Skin is warm and dry  No rash noted  No erythema  Psychiatric: She has a normal mood and affect  Her behavior is normal    Nursing note and vitals reviewed

## 2018-09-12 NOTE — ASSESSMENT & PLAN NOTE
She remains on omeprazole currently  She has a stressful couple of weeks coming up so she might increase it to twice per day, then she will try to taper down off of it

## 2018-09-12 NOTE — ASSESSMENT & PLAN NOTE
Thyroid has been very stable on 50 mcg of Synthroid daily  We will plan to recheck labs at next visit in 6 months

## 2018-09-12 NOTE — ASSESSMENT & PLAN NOTE
We discussed lipid profile  Although total is mildly elevated, she has a good HDL level  Will plan to recheck it again in 2019

## 2018-10-09 DIAGNOSIS — E03.9 HYPOTHYROIDISM, UNSPECIFIED TYPE: ICD-10-CM

## 2018-10-09 RX ORDER — LEVOTHYROXINE SODIUM 0.05 MG/1
50 TABLET ORAL DAILY
Qty: 90 TABLET | Refills: 3 | Status: SHIPPED | OUTPATIENT
Start: 2018-10-09 | End: 2019-03-13 | Stop reason: SDUPTHER

## 2018-12-19 ENCOUNTER — TRANSCRIBE ORDERS (OUTPATIENT)
Dept: ADMINISTRATIVE | Facility: HOSPITAL | Age: 58
End: 2018-12-19

## 2018-12-19 DIAGNOSIS — Z12.39 SCREENING BREAST EXAMINATION: Primary | ICD-10-CM

## 2019-01-29 ENCOUNTER — HOSPITAL ENCOUNTER (OUTPATIENT)
Dept: MAMMOGRAPHY | Facility: MEDICAL CENTER | Age: 59
Discharge: HOME | End: 2019-01-29
Payer: COMMERCIAL

## 2019-01-29 VITALS — BODY MASS INDEX: 27.64 KG/M2 | WEIGHT: 172 LBS | HEIGHT: 66 IN

## 2019-01-29 DIAGNOSIS — Z12.39 SCREENING BREAST EXAMINATION: ICD-10-CM

## 2019-01-29 PROCEDURE — 77067 SCR MAMMO BI INCL CAD: CPT

## 2019-01-29 PROCEDURE — 77063 BREAST TOMOSYNTHESIS BI: CPT

## 2019-02-13 ENCOUNTER — OFFICE VISIT (OUTPATIENT)
Dept: URGENT CARE | Facility: MEDICAL CENTER | Age: 59
End: 2019-02-13
Payer: COMMERCIAL

## 2019-02-13 VITALS
SYSTOLIC BLOOD PRESSURE: 120 MMHG | DIASTOLIC BLOOD PRESSURE: 71 MMHG | TEMPERATURE: 100.7 F | WEIGHT: 174 LBS | HEART RATE: 103 BPM | BODY MASS INDEX: 27.97 KG/M2 | OXYGEN SATURATION: 95 % | RESPIRATION RATE: 18 BRPM | HEIGHT: 66 IN

## 2019-02-13 DIAGNOSIS — R35.0 URINARY FREQUENCY: Primary | ICD-10-CM

## 2019-02-13 DIAGNOSIS — J20.9 ACUTE BRONCHITIS, UNSPECIFIED ORGANISM: ICD-10-CM

## 2019-02-13 LAB
SL AMB  POCT GLUCOSE, UA: NEGATIVE
SL AMB LEUKOCYTE ESTERASE,UA: ABNORMAL
SL AMB POCT BILIRUBIN,UA: NEGATIVE
SL AMB POCT BLOOD,UA: NEGATIVE
SL AMB POCT CLARITY,UA: CLEAR
SL AMB POCT COLOR,UA: YELLOW
SL AMB POCT KETONES,UA: NEGATIVE
SL AMB POCT NITRITE,UA: NEGATIVE
SL AMB POCT PH,UA: 6
SL AMB POCT SPECIFIC GRAVITY,UA: 1
SL AMB POCT URINE PROTEIN: ABNORMAL
SL AMB POCT UROBILINOGEN: 0.2

## 2019-02-13 PROCEDURE — 99213 OFFICE O/P EST LOW 20 MIN: CPT | Performed by: PHYSICIAN ASSISTANT

## 2019-02-13 PROCEDURE — 87086 URINE CULTURE/COLONY COUNT: CPT | Performed by: PHYSICIAN ASSISTANT

## 2019-02-13 RX ORDER — NITROFURANTOIN 25; 75 MG/1; MG/1
100 CAPSULE ORAL 2 TIMES DAILY
Qty: 14 CAPSULE | Refills: 0 | Status: SHIPPED | OUTPATIENT
Start: 2019-02-13 | End: 2019-02-20

## 2019-02-13 RX ORDER — ALBUTEROL SULFATE 2.5 MG/3ML
2.5 SOLUTION RESPIRATORY (INHALATION) ONCE
Status: COMPLETED | OUTPATIENT
Start: 2019-02-13 | End: 2019-02-13

## 2019-02-13 RX ORDER — PREDNISONE 10 MG/1
TABLET ORAL
Qty: 21 TABLET | Refills: 0 | Status: SHIPPED | OUTPATIENT
Start: 2019-02-13 | End: 2019-02-19

## 2019-02-13 RX ADMIN — Medication 0.5 MG: at 15:55

## 2019-02-13 RX ADMIN — ALBUTEROL SULFATE 2.5 MG: 2.5 SOLUTION RESPIRATORY (INHALATION) at 15:55

## 2019-02-13 NOTE — PROGRESS NOTES
[facility]        NAME: Shani Freedman is a 63 y o  female  : 1960    MRN: [id number]  DATE: 2019  TIME: 4:33 PM    Assessment and Plan   Urinary frequency [R35 0]  1  Urinary frequency  POCT urine dip    Urine culture    nitrofurantoin (MACROBID) 100 mg capsule   2  Acute bronchitis, unspecified organism  albuterol inhalation solution 2 5 mg    ipratropium (ATROVENT) 0 02 % inhalation solution 0 5 mg    predniSONE 10 mg tablet         Patient Instructions     Patient Instructions   Start taking Nitrofurantion twice a day as directed  Take antibiotic as directed  Eat yogurt to avoid GI upset  Increase fluid intake  Avoid holding bladder for prolonged periods  Urinate after intercourse  Always wipe front to back  Go to the ER for worsening symptoms: flank pain, fever/chills, nausea/vomiting, lower back pain or any other concerning symptoms  Duo-neb given in office  Take steroid taper as directed  Continue albuterol nebulizer as directed for chest tightness and cough  Follow up with PCP in 1-2 days  Go to the ER for worsening symptoms  Follow up with PCP in 3-5 days  Proceed to  ER if symptoms worsen  Chief Complaint     Chief Complaint   Patient presents with    Cough     Patient relates started with a dry cough since Tuesday night  + headache  Fever 102 0 today  Took Tylenol 1000 mg at 2:00 pm      Possible UTI     Urinary frequency since yesterday  Denies flank pain  History of Present Illness       Urinary Tract Infection    This is a new problem  The current episode started today (2-3 days)  The problem occurs every urination  The problem has been unchanged  The quality of the pain is described as aching  The pain is at a severity of 5/10  The pain is moderate  The maximum temperature recorded prior to her arrival was 100 - 100 9 F  She is sexually active  There is no history of pyelonephritis  Associated symptoms include frequency and hesitancy  Pertinent negatives include no chills, discharge, flank pain, hematuria, nausea, possible pregnancy, sweats, urgency or vomiting  She has tried nothing for the symptoms  The treatment provided mild relief  Cough   This is a new problem  The current episode started yesterday  The problem has been unchanged  The problem occurs hourly  The cough is non-productive  Associated symptoms include ear congestion, a fever, nasal congestion, postnasal drip, rhinorrhea and wheezing  Pertinent negatives include no chest pain, chills, ear pain, headaches, hemoptysis, myalgias, rash, sore throat, shortness of breath, sweats or weight loss  The treatment provided mild relief  Review of Systems   Review of Systems   Constitutional: Positive for fever  Negative for chills, fatigue and weight loss  HENT: Positive for postnasal drip and rhinorrhea  Negative for congestion, ear pain, hearing loss, sinus pressure, sinus pain and sore throat  Eyes: Negative for pain and discharge  Respiratory: Positive for cough and wheezing  Negative for hemoptysis, chest tightness and shortness of breath  Cardiovascular: Negative for chest pain  Gastrointestinal: Negative for abdominal pain, constipation, nausea and vomiting  Genitourinary: Positive for frequency and hesitancy  Negative for difficulty urinating, flank pain, hematuria and urgency  Musculoskeletal: Negative for arthralgias and myalgias  Skin: Negative for rash  Neurological: Negative for dizziness and headaches  Psychiatric/Behavioral: Negative for behavioral problems           Current Medications       Current Outpatient Medications:     albuterol (ACCUNEB) 1 25 MG/3ML nebulizer solution, 1 ampule in nebulizer every 6 hours as needed, Disp: 75 mL, Rfl: 3    budesonide (PULMICORT FLEXHALER) 90 MCG/ACT inhaler, Inhale 1 puff daily, Disp: , Rfl:     Cetirizine HCl (ZYRTEC ALLERGY) 10 MG CAPS, Take 1 capsule by mouth daily, Disp: , Rfl:     cholecalciferol (VITAMIN D3) 1,000 units tablet, Take 1 tablet by mouth daily, Disp: , Rfl:     FISH OIL-KRILL OIL PO, by Does not apply route, Disp: , Rfl:     levothyroxine 50 mcg tablet, Take 1 tablet (50 mcg total) by mouth daily, Disp: 90 tablet, Rfl: 3    omeprazole (PRILOSEC OTC) 20 MG tablet, Take by mouth, Disp: , Rfl:     PROAIR  (90 Base) MCG/ACT inhaler, , Disp: , Rfl:     nitrofurantoin (MACROBID) 100 mg capsule, Take 1 capsule (100 mg total) by mouth 2 (two) times a day for 7 days, Disp: 14 capsule, Rfl: 0    predniSONE 10 mg tablet, Day 1 Take 6 tablets by mouth then decrease by one daily until gone , Disp: 21 tablet, Rfl: 0  No current facility-administered medications for this visit       Current Allergies     Allergies as of 02/13/2019 - Reviewed 02/13/2019   Allergen Reaction Noted    Cefazolin  09/23/2013    Penicillins  07/29/2013    Sulfa antibiotics  07/29/2013            The following portions of the patient's history were reviewed and updated as appropriate: allergies, current medications, past family history, past medical history, past social history, past surgical history and problem list      Past Medical History:   Diagnosis Date    History of acute sinusitis     History of dysphagia     History of headache     new onset    History of persistent cough     Pain of both hip joints        Past Surgical History:   Procedure Laterality Date    BREAST CYST ASPIRATION  2006    DILATION AND CURETTAGE OF UTERUS      EXPLORATORY LAPAROTOMY      NASAL SEPTUM SURGERY      Nasal Septal Deviation Repair    NOSE SURGERY      Closed Treat Nasal Bone Fx w/ Manipulation Stabilization    REPAIR EXTENSOR TENDON HAND         Family History   Problem Relation Age of Onset    Dementia Mother     Aneurysm Father         of the Left Middle Cerebral Artery    Coronary artery disease Father     Hypertension Father     Asthma Sister     Breast cancer Sister 77    Hypertension Brother     Asthma Son 23    Bipolar disorder Son         NOS    Depression Child         Emotional Depression    Ovarian cancer Maternal Grandmother 62    Lung cancer Maternal Grandfather 58    Breast cancer Maternal Aunt [de-identified]    Breast cancer Other 50    Pancreatic cancer Maternal Uncle 37         Medications have been verified  Objective   /71   Pulse 103   Temp (!) 100 7 °F (38 2 °C) (Tympanic)   Resp 18   Ht 5' 6" (1 676 m)   Wt 78 9 kg (174 lb)   SpO2 95%   BMI 28 08 kg/m²        Physical Exam     Physical Exam   Constitutional: She is oriented to person, place, and time  She appears well-developed and well-nourished  HENT:   Nose: Mucosal edema and rhinorrhea present  No epistaxis  Right sinus exhibits no maxillary sinus tenderness and no frontal sinus tenderness  Left sinus exhibits no maxillary sinus tenderness and no frontal sinus tenderness  Mouth/Throat: Posterior oropharyngeal edema and posterior oropharyngeal erythema present  Cardiovascular: Normal rate, regular rhythm and normal heart sounds  No murmur heard  Pulmonary/Chest: Effort normal and breath sounds normal  No respiratory distress  Abdominal: Soft  Normal appearance and bowel sounds are normal  She exhibits no distension  There is tenderness in the suprapubic area  There is no rigidity, no rebound, no guarding, no CVA tenderness, no tenderness at McBurney's point and negative Villalta's sign  Neurological: She is alert and oriented to person, place, and time  Psychiatric: She has a normal mood and affect  Nursing note and vitals reviewed  Duo-Neb: subjective improvement  Improved air exchange

## 2019-02-13 NOTE — PATIENT INSTRUCTIONS
Start taking Nitrofurantion twice a day as directed  Take antibiotic as directed  Eat yogurt to avoid GI upset  Increase fluid intake  Avoid holding bladder for prolonged periods  Urinate after intercourse  Always wipe front to back  Go to the ER for worsening symptoms: flank pain, fever/chills, nausea/vomiting, lower back pain or any other concerning symptoms  Duo-neb given in office  Take steroid taper as directed  Continue albuterol nebulizer as directed for chest tightness and cough  Follow up with PCP in 1-2 days  Go to the ER for worsening symptoms

## 2019-02-14 LAB — BACTERIA UR CULT: NORMAL

## 2019-02-19 ENCOUNTER — TELEPHONE (OUTPATIENT)
Dept: URGENT CARE | Facility: MEDICAL CENTER | Age: 59
End: 2019-02-19

## 2019-02-19 NOTE — TELEPHONE ENCOUNTER
Patient is calling  States seen here on 2/13/19 for UTI symptoms and wanted to see if urine culture was positive  Aware urine culture shows mixed contaminants  Per Dr Neeraj Loaiza patient is to finish antibiotic as prescribed  Patient states, she is feeling better

## 2019-03-13 ENCOUNTER — OFFICE VISIT (OUTPATIENT)
Dept: FAMILY MEDICINE CLINIC | Facility: CLINIC | Age: 59
End: 2019-03-13
Payer: COMMERCIAL

## 2019-03-13 VITALS
DIASTOLIC BLOOD PRESSURE: 68 MMHG | HEIGHT: 67 IN | WEIGHT: 177 LBS | OXYGEN SATURATION: 97 % | TEMPERATURE: 98.5 F | BODY MASS INDEX: 27.78 KG/M2 | SYSTOLIC BLOOD PRESSURE: 90 MMHG | HEART RATE: 81 BPM

## 2019-03-13 DIAGNOSIS — E78.2 MIXED HYPERLIPIDEMIA: ICD-10-CM

## 2019-03-13 DIAGNOSIS — J45.30 MILD PERSISTENT ASTHMA WITHOUT COMPLICATION: Primary | ICD-10-CM

## 2019-03-13 DIAGNOSIS — K21.9 GERD WITHOUT ESOPHAGITIS: ICD-10-CM

## 2019-03-13 DIAGNOSIS — E03.9 HYPOTHYROIDISM, UNSPECIFIED TYPE: ICD-10-CM

## 2019-03-13 PROCEDURE — 3008F BODY MASS INDEX DOCD: CPT | Performed by: FAMILY MEDICINE

## 2019-03-13 PROCEDURE — 99214 OFFICE O/P EST MOD 30 MIN: CPT | Performed by: FAMILY MEDICINE

## 2019-03-13 PROCEDURE — 1036F TOBACCO NON-USER: CPT | Performed by: FAMILY MEDICINE

## 2019-03-13 RX ORDER — MOMETASONE FUROATE 50 UG/1
SPRAY, METERED NASAL
Refills: 11 | COMMUNITY
Start: 2019-02-05

## 2019-03-13 RX ORDER — LEVOTHYROXINE SODIUM 0.05 MG/1
50 TABLET ORAL DAILY
Qty: 90 TABLET | Refills: 3 | Status: SHIPPED | OUTPATIENT
Start: 2019-03-13 | End: 2020-03-03

## 2019-03-13 NOTE — ASSESSMENT & PLAN NOTE
Asthma has been stable on Pulmicort inhaler daily maintenance  She only rarely uses ProAir  She still goes for yearly allergy visits and takes Zyrtec on a daily basis

## 2019-03-13 NOTE — ASSESSMENT & PLAN NOTE
She has decreased to omeprazole once per day  She tried going off of it but had too many symptoms  I recommended a trial of Zantac or Pepcid instead

## 2019-03-13 NOTE — PROGRESS NOTES
Assessment/Plan:    Hypothyroidism  She feels well on levothyroxine 50 mcg daily  Will check TSH  Asthma  Asthma has been stable on Pulmicort inhaler daily maintenance  She only rarely uses ProAir  She still goes for yearly allergy visits and takes Zyrtec on a daily basis  GERD without esophagitis  She has decreased to omeprazole once per day  She tried going off of it but had too many symptoms  I recommended a trial of Zantac or Pepcid instead  Diagnoses and all orders for this visit:    Mild persistent asthma without complication    Hypothyroidism, unspecified type  -     levothyroxine 50 mcg tablet; Take 1 tablet (50 mcg total) by mouth daily    GERD without esophagitis    Other orders  -     mometasone (NASONEX) 50 mcg/act nasal spray; SHAKE LQ AND U 1 SPR IEN IN THE MORNING  -     Cholecalciferol (VITAMIN D PO); Take 5,000 Units by mouth daily          Subjective:      Patient ID: Angela Wood is a 63 y o  female  She is here for follow-up  In general she is feeling well currently, however she had bronchitis in February and went to urgent care  She was treated with prednisone and symptoms resolved  She feels like her asthma is under control with the Pulmicort inhaler daily  She only needs to add albuterol once in a while  Thyroid has been stable  She had retired from [employer] and now she started PT job with flexible hours          The following portions of the patient's history were reviewed and updated as appropriate: allergies, current medications, past family history, past medical history, past social history, past surgical history and problem list     Review of Systems   Constitutional: Negative for activity change, chills, fatigue and fever  HENT: Negative for congestion, ear pain, sinus pressure and sore throat  Eyes: Negative for pain and visual disturbance  Respiratory: Negative for cough, chest tightness, shortness of breath and wheezing      Cardiovascular: Negative for chest pain, palpitations and leg swelling  Gastrointestinal: Negative for abdominal pain, blood in stool, constipation, diarrhea, nausea and vomiting  Endocrine: Negative for polydipsia and polyuria  Genitourinary: Negative for difficulty urinating, dysuria, frequency and urgency  Musculoskeletal: Negative for arthralgias, joint swelling and myalgias  Skin: Negative for rash  Neurological: Negative for dizziness, weakness, numbness and headaches  Hematological: Negative for adenopathy  Does not bruise/bleed easily  Psychiatric/Behavioral: Negative for dysphoric mood  The patient is not nervous/anxious  Objective:      BP 90/68   Pulse 81   Temp 98 5 °F (36 9 °C) (Tympanic)   Ht 5' 6 5" (1 689 m)   Wt 80 3 kg (177 lb)   SpO2 97%   BMI 28 14 kg/m²          Physical Exam   Constitutional: She is oriented to person, place, and time  She appears well-developed and well-nourished  No distress  HENT:   Head: Normocephalic and atraumatic  Right Ear: External ear normal    Left Ear: External ear normal    Nose: Nose normal    Mouth/Throat: Oropharynx is clear and moist    Eyes: Pupils are equal, round, and reactive to light  Conjunctivae and EOM are normal  No scleral icterus  Neck: Normal range of motion  Neck supple  No thyromegaly present  Cardiovascular: Normal rate, regular rhythm and normal heart sounds  No murmur heard  Pulmonary/Chest: Effort normal and breath sounds normal  She has no wheezes  She has no rales  Abdominal: She exhibits no mass  There is no tenderness  Musculoskeletal: She exhibits no tenderness or deformity  Lymphadenopathy:     She has no cervical adenopathy  Neurological: She is alert and oriented to person, place, and time  She has normal reflexes  No cranial nerve deficit  Skin: Skin is warm and dry  No rash noted  No erythema  Psychiatric: She has a normal mood and affect   Her behavior is normal    Nursing note and vitals reviewed

## 2019-03-20 ENCOUNTER — OFFICE VISIT (OUTPATIENT)
Dept: FAMILY MEDICINE CLINIC | Facility: CLINIC | Age: 59
End: 2019-03-20
Payer: COMMERCIAL

## 2019-03-20 VITALS
TEMPERATURE: 98.2 F | HEART RATE: 72 BPM | WEIGHT: 178.38 LBS | BODY MASS INDEX: 28 KG/M2 | RESPIRATION RATE: 16 BRPM | OXYGEN SATURATION: 97 % | SYSTOLIC BLOOD PRESSURE: 110 MMHG | HEIGHT: 67 IN | DIASTOLIC BLOOD PRESSURE: 80 MMHG

## 2019-03-20 DIAGNOSIS — J45.20 MILD INTERMITTENT ASTHMA WITHOUT COMPLICATION: ICD-10-CM

## 2019-03-20 DIAGNOSIS — J45.31 MILD PERSISTENT ASTHMA WITH ACUTE EXACERBATION: ICD-10-CM

## 2019-03-20 DIAGNOSIS — J40 BRONCHITIS: Primary | ICD-10-CM

## 2019-03-20 PROCEDURE — 99213 OFFICE O/P EST LOW 20 MIN: CPT | Performed by: FAMILY MEDICINE

## 2019-03-20 PROCEDURE — 3008F BODY MASS INDEX DOCD: CPT | Performed by: FAMILY MEDICINE

## 2019-03-20 PROCEDURE — 1036F TOBACCO NON-USER: CPT | Performed by: FAMILY MEDICINE

## 2019-03-20 RX ORDER — ALBUTEROL SULFATE 1.25 MG/3ML
SOLUTION RESPIRATORY (INHALATION)
Qty: 25 VIAL | Refills: 3 | Status: SHIPPED | OUTPATIENT
Start: 2019-03-20 | End: 2021-05-28 | Stop reason: ALTCHOICE

## 2019-03-20 RX ORDER — PREDNISONE 10 MG/1
TABLET ORAL
Qty: 20 TABLET | Refills: 0 | Status: SHIPPED | OUTPATIENT
Start: 2019-03-20 | End: 2019-09-18 | Stop reason: ALTCHOICE

## 2019-03-20 RX ORDER — BENZONATATE 200 MG/1
200 CAPSULE ORAL 3 TIMES DAILY PRN
Qty: 20 CAPSULE | Refills: 0 | Status: SHIPPED | OUTPATIENT
Start: 2019-03-20 | End: 2019-09-18 | Stop reason: ALTCHOICE

## 2019-03-20 NOTE — ASSESSMENT & PLAN NOTE
She has a viral bronchitis which is causing asthma exacerbation  Will treat with prednisone taper and Tessalon Perles  I also gave her refill on her albuterol solution for the nebulizer

## 2019-03-20 NOTE — PROGRESS NOTES
Assessment/Plan:    Asthma  She has a viral bronchitis which is causing asthma exacerbation  Will treat with prednisone taper and Tessalon Perles  I also gave her refill on her albuterol solution for the nebulizer  Diagnoses and all orders for this visit:    Bronchitis  Comments: Will treat with prednisone taper  Refilled her albuterol solution for the nebulizer  Increased hydration and call if symptoms are not improving  Orders:  -     benzonatate (TESSALON) 200 MG capsule; Take 1 capsule (200 mg total) by mouth 3 (three) times a day as needed for cough  -     predniSONE 10 mg tablet; Take 4 pills po daily for 2 days, then 3 pills daily for 2 days, etc     Mild intermittent asthma without complication  -     albuterol (ACCUNEB) 1 25 MG/3ML nebulizer solution; 1 ampule in nebulizer every 6 hours as needed    Mild persistent asthma with acute exacerbation          Subjective:      Patient ID: Liliya Campo is a 63 y o  female  She was here last week for regular appointment, but then couple days afterwards she developed coughing  Unfortunately she is having some wheezing and shortness of breath as well  She Has runny nose but denies sore throat  She denies fever and chills  Voice is raspy  Nebulizer helps  She takes        The following portions of the patient's history were reviewed and updated as appropriate: allergies, current medications, past family history, past medical history, past social history, past surgical history and problem list     Review of Systems   Constitutional: Positive for fatigue  Negative for chills and fever  HENT: Positive for rhinorrhea  Respiratory: Positive for cough, shortness of breath and wheezing  Gastrointestinal: Negative for diarrhea, nausea and vomiting  Neurological: Negative for headaches           Objective:      /80 (BP Location: Left arm, Patient Position: Sitting, Cuff Size: Standard)   Pulse 72   Temp 98 2 °F (36 8 °C)   Resp 16 Ht 5' 6 5" (1 689 m)   Wt 80 9 kg (178 lb 6 oz)   SpO2 97%   BMI 28 36 kg/m²          Physical Exam   Constitutional: She is oriented to person, place, and time  She appears well-developed and well-nourished  HENT:   Head: Normocephalic and atraumatic  Right Ear: External ear normal    Left Ear: External ear normal    Nose with mucosal edema and clear rhinorrhea   Neck: Normal range of motion  Neck supple  No thyromegaly present  Cardiovascular: Normal rate and regular rhythm  Pulmonary/Chest: Effort normal  No respiratory distress  She has wheezes  There were scattered expiratory wheezes  Lymphadenopathy:     She has no cervical adenopathy  Neurological: She is alert and oriented to person, place, and time  Skin: Skin is warm  Nursing note and vitals reviewed

## 2019-05-26 LAB
ALBUMIN SERPL-MCNC: 4.4 G/DL (ref 3.6–5.1)
ALBUMIN/GLOB SERPL: 1.8 (CALC) (ref 1–2.5)
ALP SERPL-CCNC: 78 U/L (ref 33–130)
ALT SERPL-CCNC: 26 U/L (ref 6–29)
AST SERPL-CCNC: 15 U/L (ref 10–35)
BILIRUB SERPL-MCNC: 1.1 MG/DL (ref 0.2–1.2)
BUN SERPL-MCNC: 14 MG/DL (ref 7–25)
BUN/CREAT SERPL: NORMAL (CALC) (ref 6–22)
CALCIUM SERPL-MCNC: 9.2 MG/DL (ref 8.6–10.4)
CHLORIDE SERPL-SCNC: 104 MMOL/L (ref 98–110)
CHOLEST SERPL-MCNC: 230 MG/DL
CHOLEST/HDLC SERPL: 4 (CALC)
CO2 SERPL-SCNC: 29 MMOL/L (ref 20–32)
CREAT SERPL-MCNC: 0.73 MG/DL (ref 0.5–1.05)
GLOBULIN SER CALC-MCNC: 2.5 G/DL (CALC) (ref 1.9–3.7)
GLUCOSE SERPL-MCNC: 98 MG/DL (ref 65–99)
HDLC SERPL-MCNC: 58 MG/DL
LDLC SERPL CALC-MCNC: 156 MG/DL (CALC)
NONHDLC SERPL-MCNC: 172 MG/DL (CALC)
POTASSIUM SERPL-SCNC: 4.2 MMOL/L (ref 3.5–5.3)
PROT SERPL-MCNC: 6.9 G/DL (ref 6.1–8.1)
SL AMB EGFR AFRICAN AMERICAN: 106 ML/MIN/1.73M2
SL AMB EGFR NON AFRICAN AMERICAN: 91 ML/MIN/1.73M2
SODIUM SERPL-SCNC: 141 MMOL/L (ref 135–146)
TRIGL SERPL-MCNC: 56 MG/DL
TSH SERPL-ACNC: 0.58 MIU/L (ref 0.4–4.5)

## 2019-09-18 ENCOUNTER — OFFICE VISIT (OUTPATIENT)
Dept: FAMILY MEDICINE CLINIC | Facility: CLINIC | Age: 59
End: 2019-09-18
Payer: COMMERCIAL

## 2019-09-18 VITALS
HEART RATE: 61 BPM | BODY MASS INDEX: 26.78 KG/M2 | OXYGEN SATURATION: 97 % | WEIGHT: 170.6 LBS | HEIGHT: 67 IN | SYSTOLIC BLOOD PRESSURE: 108 MMHG | DIASTOLIC BLOOD PRESSURE: 80 MMHG | TEMPERATURE: 99.1 F

## 2019-09-18 DIAGNOSIS — K21.9 GERD WITHOUT ESOPHAGITIS: ICD-10-CM

## 2019-09-18 DIAGNOSIS — Z00.00 ANNUAL PHYSICAL EXAM: ICD-10-CM

## 2019-09-18 DIAGNOSIS — Z23 NEED FOR TDAP VACCINATION: ICD-10-CM

## 2019-09-18 DIAGNOSIS — Z00.00 ENCOUNTER FOR HEALTH MAINTENANCE EXAMINATION IN ADULT: Primary | ICD-10-CM

## 2019-09-18 DIAGNOSIS — E03.9 HYPOTHYROIDISM, UNSPECIFIED TYPE: ICD-10-CM

## 2019-09-18 PROCEDURE — 90471 IMMUNIZATION ADMIN: CPT

## 2019-09-18 PROCEDURE — 90715 TDAP VACCINE 7 YRS/> IM: CPT

## 2019-09-18 PROCEDURE — 99396 PREV VISIT EST AGE 40-64: CPT | Performed by: FAMILY MEDICINE

## 2019-09-18 NOTE — PATIENT INSTRUCTIONS

## 2019-09-18 NOTE — PROGRESS NOTES
[facility]    NAME: Vidhi Murphy  AGE: 63 y o  SEX: female  : 1960     DATE: 2019     Assessment and Plan:     Problem List Items Addressed This Visit        Digestive    GERD without esophagitis     She will try switching from omeprazole to one of the H2 blockers such as Zantac or Pepcid  Endocrine    Hypothyroidism - Primary     Thyroid has been well controlled on current dose of levothyroxine 15 mcg daily  Immunizations and preventive care screenings were discussed with patient today  Appropriate education was printed on patient's after visit summary  Counseling:  · Exercise: the importance of regular exercise/physical activity was discussed  Recommend exercise 3-5 times per week for at least 30 minutes  BMI Counseling: Body mass index is 27 12 kg/m²  The BMI is above normal  Nutrition recommendations include decreasing portion sizes and encouraging healthy choices of fruits and vegetables  Exercise recommendations include moderate physical activity 150 minutes/week  No pharmacotherapy was ordered  Patient referred to PCP due to patient being overweight  No follow-ups on file  Chief Complaint:     Chief Complaint   Patient presents with    Annual Exam      History of Present Illness:     Adult Annual Physical   Patient here for a comprehensive physical exam  The patient reports problems - had abd pain last week lasted for 2 days related to stress of job interview  Diet and Physical Activity  · Diet/Nutrition: well balanced diet and consuming 3-5 servings of fruits/vegetables daily  · Exercise: no formal exercise        Depression Screening  PHQ-9 Depression Screening    PHQ-9:    Frequency of the following problems over the past two weeks:       Little interest or pleasure in doing things:  0 - not at all  Feeling down, depressed, or hopeless:  0 - not at all  PHQ-2 Score:  0       General Health  · Sleep: sleeps poorly and gets 4-6 hours of sleep on average  · Hearing: normal - bilateral   · Vision: vision problems: her eye doctor is watching a spot left macula, most recent eye exam <1 year ago and wears contacts  · Dental: regular dental visits and brushes teeth twice daily  /GYN Health  · Patient is: postmenopausal  · Last menstrual period: 2017  · Contraceptive method: none  Review of Systems:     Review of Systems   Constitutional: Negative for activity change, chills, fatigue and fever  HENT: Positive for congestion  Negative for ear pain, sinus pressure and sore throat  Eyes: Negative for pain and visual disturbance  Respiratory: Negative for cough, chest tightness, shortness of breath and wheezing  Cardiovascular: Negative for chest pain, palpitations and leg swelling  Gastrointestinal: Negative for abdominal pain, blood in stool, constipation, diarrhea, nausea and vomiting  Endocrine: Negative for polydipsia and polyuria  Genitourinary: Negative for difficulty urinating, dysuria, frequency and urgency  Musculoskeletal: Negative for arthralgias, joint swelling and myalgias  Skin: Negative for rash  Allergic/Immunologic: Positive for environmental allergies  Neurological: Negative for dizziness, weakness, numbness and headaches  Hematological: Negative for adenopathy  Does not bruise/bleed easily  Psychiatric/Behavioral: Negative for dysphoric mood  The patient is not nervous/anxious         Past Medical History:     Past Medical History:   Diagnosis Date    History of acute sinusitis     History of dysphagia     History of headache     new onset    History of persistent cough     Pain of both hip joints       Past Surgical History:     Past Surgical History:   Procedure Laterality Date    BREAST CYST ASPIRATION  2006    DILATION AND CURETTAGE OF UTERUS      EXPLORATORY LAPAROTOMY      NASAL SEPTUM SURGERY Nasal Septal Deviation Repair    NOSE SURGERY      Closed Treat Nasal Bone Fx w/ Manipulation Stabilization    REPAIR EXTENSOR TENDON HAND        Social History:     Social History     Socioeconomic History    Marital status: /Civil Union     Spouse name: None    Number of children: None    Years of education: None    Highest education level: None   Occupational History    None   Social Needs    Financial resource strain: None    Food insecurity:     Worry: None     Inability: None    Transportation needs:     Medical: None     Non-medical: None   Tobacco Use    Smoking status: Former Smoker     Last attempt to quit: 1978     Years since quittin 0    Smokeless tobacco: Never Used   Substance and Sexual Activity    Alcohol use:  Yes     Alcohol/week: 3 0 standard drinks     Types: 3 Standard drinks or equivalent per week     Comment: socially drinks 1 time per week    Drug use: None    Sexual activity: None   Lifestyle    Physical activity:     Days per week: None     Minutes per session: None    Stress: None   Relationships    Social connections:     Talks on phone: None     Gets together: None     Attends [employer] service: None     Active member of club or organization: None     Attends meetings of clubs or organizations: None     Relationship status: None    Intimate partner violence:     Fear of current or ex partner: None     Emotionally abused: None     Physically abused: None     Forced sexual activity: None   Other Topics Concern    None   Social History Narrative    Caffeine use: 1 20 oz of coffee      Family History:     Family History   Problem Relation Age of Onset    Dementia Mother     Aneurysm Father         of the Left Middle Cerebral Artery    Coronary artery disease Father     Hypertension Father     Asthma Sister     Breast cancer Sister 77    Hypertension Brother     Asthma Son 23    Bipolar disorder Son         NOS    Depression Child Emotional Depression    Ovarian cancer Maternal Grandmother 62    Lung cancer Maternal Grandfather 58    Breast cancer Maternal Aunt [de-identified]    Breast cancer Other 50    Pancreatic cancer Maternal Uncle 37      Current Medications:     Current Outpatient Medications   Medication Sig Dispense Refill    albuterol (ACCUNEB) 1 25 MG/3ML nebulizer solution 1 ampule in nebulizer every 6 hours as needed 25 vial 3    budesonide (PULMICORT FLEXHALER) 90 MCG/ACT inhaler Inhale 1 puff daily      Cetirizine HCl (ZYRTEC ALLERGY) 10 MG CAPS Take 1 capsule by mouth daily      Cholecalciferol (VITAMIN D PO) Take 5,000 Units by mouth daily      FISH OIL-KRILL OIL PO by Does not apply route      levothyroxine 50 mcg tablet Take 1 tablet (50 mcg total) by mouth daily 90 tablet 3    mometasone (NASONEX) 50 mcg/act nasal spray SHAKE LQ AND U 1 SPR IEN IN THE MORNING  11    omeprazole (PRILOSEC OTC) 20 MG tablet Take by mouth       No current facility-administered medications for this visit  Allergies: Allergies   Allergen Reactions    Sulfa Antibiotics Anaphylaxis     HIVES    Cefazolin      Ancef another name for cefazolin    Penicillins       Physical Exam:     /80 (BP Location: Left arm, Patient Position: Sitting, Cuff Size: Standard)   Pulse 61   Temp 99 1 °F (37 3 °C) (Tympanic)   Ht 5' 6 5" (1 689 m)   Wt 77 4 kg (170 lb 9 6 oz)   SpO2 97%   BMI 27 12 kg/m²     Physical Exam   Constitutional: She is oriented to person, place, and time  She appears well-developed and well-nourished  No distress  HENT:   Head: Normocephalic and atraumatic  Right Ear: External ear normal    Left Ear: External ear normal    Nose: Nose normal    Mouth/Throat: Oropharynx is clear and moist    Eyes: Pupils are equal, round, and reactive to light  Conjunctivae and EOM are normal  No scleral icterus  Neck: Normal range of motion  Neck supple  No thyromegaly present     Cardiovascular: Normal rate, regular rhythm, normal heart sounds and intact distal pulses  No murmur heard  Pulmonary/Chest: Effort normal and breath sounds normal  She has no wheezes  She has no rales  Abdominal: Soft  Bowel sounds are normal  She exhibits no mass  There is no tenderness  Musculoskeletal: She exhibits no tenderness or deformity  Lymphadenopathy:     She has no cervical adenopathy  Neurological: She is alert and oriented to person, place, and time  She has normal reflexes  No cranial nerve deficit  Skin: Skin is warm and dry  No rash noted  No erythema  Psychiatric: She has a normal mood and affect  Her behavior is normal    Nursing note and vitals reviewed        MD [facility] Finn [id number]

## 2019-10-18 ENCOUNTER — IMMUNIZATIONS (OUTPATIENT)
Dept: FAMILY MEDICINE CLINIC | Facility: CLINIC | Age: 59
End: 2019-10-18
Payer: COMMERCIAL

## 2019-10-18 DIAGNOSIS — Z23 FLU VACCINE NEED: Primary | ICD-10-CM

## 2019-10-18 PROCEDURE — 90471 IMMUNIZATION ADMIN: CPT

## 2019-10-18 PROCEDURE — 90682 RIV4 VACC RECOMBINANT DNA IM: CPT

## 2020-01-13 ENCOUNTER — TRANSCRIBE ORDERS (OUTPATIENT)
Dept: ADMINISTRATIVE | Facility: HOSPITAL | Age: 60
End: 2020-01-13

## 2020-01-13 DIAGNOSIS — Z12.31 ENCOUNTER FOR SCREENING MAMMOGRAM FOR MALIGNANT NEOPLASM OF BREAST: Primary | ICD-10-CM

## 2020-03-03 DIAGNOSIS — E03.9 HYPOTHYROIDISM, UNSPECIFIED TYPE: ICD-10-CM

## 2020-03-03 RX ORDER — LEVOTHYROXINE SODIUM 0.05 MG/1
TABLET ORAL
Qty: 90 TABLET | Refills: 3 | Status: SHIPPED | OUTPATIENT
Start: 2020-03-03 | End: 2021-02-26

## 2020-03-05 ENCOUNTER — HOSPITAL ENCOUNTER (OUTPATIENT)
Dept: MAMMOGRAPHY | Facility: MEDICAL CENTER | Age: 60
Discharge: HOME | End: 2020-03-05
Payer: COMMERCIAL

## 2020-03-05 VITALS — BODY MASS INDEX: 26.68 KG/M2 | HEIGHT: 67 IN | WEIGHT: 170 LBS

## 2020-03-05 DIAGNOSIS — Z12.31 ENCOUNTER FOR SCREENING MAMMOGRAM FOR MALIGNANT NEOPLASM OF BREAST: ICD-10-CM

## 2020-03-05 PROCEDURE — 77063 BREAST TOMOSYNTHESIS BI: CPT

## 2020-03-05 PROCEDURE — 77067 SCR MAMMO BI INCL CAD: CPT

## 2020-03-07 ENCOUNTER — OFFICE VISIT (OUTPATIENT)
Dept: URGENT CARE | Facility: MEDICAL CENTER | Age: 60
End: 2020-03-07
Payer: COMMERCIAL

## 2020-03-07 VITALS
DIASTOLIC BLOOD PRESSURE: 66 MMHG | HEART RATE: 75 BPM | OXYGEN SATURATION: 96 % | BODY MASS INDEX: 26.84 KG/M2 | WEIGHT: 171 LBS | RESPIRATION RATE: 16 BRPM | HEIGHT: 67 IN | TEMPERATURE: 99 F | SYSTOLIC BLOOD PRESSURE: 117 MMHG

## 2020-03-07 DIAGNOSIS — B96.89 ACUTE BACTERIAL SINUSITIS: Primary | ICD-10-CM

## 2020-03-07 DIAGNOSIS — J01.90 ACUTE BACTERIAL SINUSITIS: Primary | ICD-10-CM

## 2020-03-07 PROCEDURE — 99213 OFFICE O/P EST LOW 20 MIN: CPT | Performed by: PHYSICIAN ASSISTANT

## 2020-03-07 RX ORDER — AZITHROMYCIN 250 MG/1
TABLET, FILM COATED ORAL
Qty: 6 TABLET | Refills: 0 | Status: SHIPPED | OUTPATIENT
Start: 2020-03-07 | End: 2020-03-11

## 2020-03-07 NOTE — PATIENT INSTRUCTIONS

## 2020-03-07 NOTE — PROGRESS NOTES
[facility]        NAME: Missy Osorio is a 63 y o  female  : 1960    MRN: [id number]  DATE: 2020  TIME: 5:17 PM    Assessment and Plan   Acute bacterial sinusitis [J01 90, B96 89]  1  Acute bacterial sinusitis  azithromycin (ZITHROMAX) 250 mg tablet         Patient Instructions       Follow up with PCP in 3-5 days  Increase fluids, increase steroid nasal spray to 2 puffs each nostril daily  Chief Complaint     Chief Complaint   Patient presents with    Cold Like Symptoms     Patient presents with cough, congestion, and sinus pressure with some airway tightness for 2 days  She has been using her albuterol  History of Present Illness       Patient is had increases in facial pressure as well as tooth pain  She also has green yellowish nasal discharge  Sinusitis   This is a new problem  The current episode started 1 to 4 weeks ago  The problem has been gradually worsening since onset  The maximum temperature recorded prior to her arrival was 100 4 - 100 9 F  The pain is mild  Associated symptoms include congestion, coughing and sinus pressure  Pertinent negatives include no chills, diaphoresis, ear pain, headaches, hoarse voice, neck pain, shortness of breath, sneezing, sore throat or swollen glands  Past treatments include oral decongestants  The treatment provided no relief  Review of Systems   Review of Systems   Constitutional: Negative for chills and diaphoresis  HENT: Positive for congestion and sinus pressure  Negative for ear pain, hoarse voice, sneezing and sore throat  Respiratory: Positive for cough  Negative for shortness of breath  Musculoskeletal: Negative for neck pain  Neurological: Negative for headaches  All other systems reviewed and are negative          Current Medications       Current Outpatient Medications:     albuterol (ACCUNEB) 1 25 MG/3ML nebulizer solution, 1 ampule in nebulizer every 6 hours as needed, Disp: 25 vial, Rfl: 3    budesonide (PULMICORT FLEXHALER) 90 MCG/ACT inhaler, Inhale 1 puff daily, Disp: , Rfl:     Cholecalciferol (VITAMIN D PO), Take 5,000 Units by mouth daily, Disp: , Rfl:     FISH OIL-KRILL OIL PO, by Does not apply route, Disp: , Rfl:     levothyroxine 50 mcg tablet, TAKE 1 TABLET BY MOUTH EVERY DAY, Disp: 90 tablet, Rfl: 3    mometasone (NASONEX) 50 mcg/act nasal spray, SHAKE LQ AND U 1 SPR IEN IN THE MORNING, Disp: , Rfl: 11    omeprazole (PRILOSEC OTC) 20 MG tablet, Take by mouth, Disp: , Rfl:     azithromycin (ZITHROMAX) 250 mg tablet, Take 2 tablets today then 1 tablet daily x 4 days, Disp: 6 tablet, Rfl: 0    Cetirizine HCl (ZYRTEC ALLERGY) 10 MG CAPS, Take 1 capsule by mouth daily, Disp: , Rfl:     Current Allergies     Allergies as of 03/07/2020 - Reviewed 03/07/2020   Allergen Reaction Noted    Sulfa antibiotics Anaphylaxis 07/29/2013    Cefazolin  09/23/2013    Penicillins  07/29/2013            The following portions of the patient's history were reviewed and updated as appropriate: allergies, current medications, past family history, past medical history, past social history, past surgical history and problem list      Past Medical History:   Diagnosis Date    BRCA1 negative     BRCA2 negative     History of acute sinusitis     History of dysphagia     History of headache     new onset    History of persistent cough     Pain of both hip joints        Past Surgical History:   Procedure Laterality Date    BREAST CYST ASPIRATION  2006    DILATION AND CURETTAGE OF UTERUS      EXPLORATORY LAPAROTOMY      NASAL SEPTUM SURGERY      Nasal Septal Deviation Repair    NOSE SURGERY      Closed Treat Nasal Bone Fx w/ Manipulation Stabilization    REPAIR EXTENSOR TENDON HAND         Family History   Problem Relation Age of Onset    Dementia Mother     Aneurysm Father         of the Left Middle Cerebral Artery    Coronary artery disease Father     Hypertension Father     Asthma Sister     Breast cancer Sister 77    Hypertension Brother     Asthma Son 23    Bipolar disorder Son         NOS    Depression Child         Emotional Depression    Ovarian cancer Maternal Grandmother 62    Lung cancer Maternal Grandfather 58    Breast cancer Maternal Aunt [de-identified]    Breast cancer Other 50    Pancreatic cancer Maternal Uncle 37    No Known Problems Maternal Aunt     No Known Problems Paternal Aunt          Medications have been verified  Objective   /66   Pulse 75   Temp 99 °F (37 2 °C) (Tympanic)   Resp 16   Ht 5' 6 5" (1 689 m)   Wt 77 6 kg (171 lb)   SpO2 96%   BMI 27 19 kg/m²        Physical Exam     Physical Exam   Constitutional: She appears well-developed and well-nourished  HENT:   Right Ear: Hearing, external ear and ear canal normal  Tympanic membrane is bulging  Left Ear: Hearing, external ear and ear canal normal  Tympanic membrane is bulging  Nose: Mucosal edema and rhinorrhea present  Mouth/Throat: Posterior oropharyngeal erythema present  Cardiovascular: Normal rate, regular rhythm and normal heart sounds  Pulmonary/Chest: Effort normal and breath sounds normal    Nursing note and vitals reviewed  Positive maxillary tenderness and poor transillumination bilaterally

## 2020-03-10 ENCOUNTER — OFFICE VISIT (OUTPATIENT)
Dept: FAMILY MEDICINE CLINIC | Facility: CLINIC | Age: 60
End: 2020-03-10
Payer: COMMERCIAL

## 2020-03-10 VITALS
DIASTOLIC BLOOD PRESSURE: 68 MMHG | WEIGHT: 173.6 LBS | HEART RATE: 70 BPM | BODY MASS INDEX: 27.25 KG/M2 | OXYGEN SATURATION: 97 % | HEIGHT: 67 IN | TEMPERATURE: 97.7 F | SYSTOLIC BLOOD PRESSURE: 119 MMHG

## 2020-03-10 DIAGNOSIS — J40 BRONCHITIS: Primary | ICD-10-CM

## 2020-03-10 PROCEDURE — 3008F BODY MASS INDEX DOCD: CPT | Performed by: FAMILY MEDICINE

## 2020-03-10 PROCEDURE — 99213 OFFICE O/P EST LOW 20 MIN: CPT | Performed by: FAMILY MEDICINE

## 2020-03-10 PROCEDURE — 1036F TOBACCO NON-USER: CPT | Performed by: FAMILY MEDICINE

## 2020-03-10 RX ORDER — BENZONATATE 200 MG/1
200 CAPSULE ORAL 3 TIMES DAILY PRN
Qty: 30 CAPSULE | Refills: 0 | Status: SHIPPED | OUTPATIENT
Start: 2020-03-10 | End: 2020-08-18 | Stop reason: ALTCHOICE

## 2020-03-10 NOTE — PROGRESS NOTES
Assessment/Plan:    She appears to have bronchitis which appears to be turning the corner and improving  Recommended continuing with the neb treatments up to t i d  She should also continue with increased hydration and finish her Z-Charly  Gave prescription for Caren Meter as needed for cough  She should contact us if symptoms are not resolving  Diagnoses and all orders for this visit:    Bronchitis  -     benzonatate (TESSALON) 200 MG capsule; Take 1 capsule (200 mg total) by mouth 3 (three) times a day as needed for cough          Subjective:      Patient ID: Missy Osorio is a 63 y o  female  She developed sinusitis symptoms last week  It started with ST Tuesday 1 week ago today, but Saturday she developed pressure behind her eyes and increased nasal congestion  She went to urgent care and was diagnosed with sinusitis and given Z-Charly  She felt better the next day with decreased pressure but then she developed harsh coughing  Cough has been dry but she thinks it sounds like there is mucus down there  She has also had low-grade fever  She gets mild relief from Tylenol or Advil  She denies any recent exposure to traveler's from out of the country or known exposure to corona virus  She has been using albuterol inhaler and nebulizer and feels like the cough is looser now  The following portions of the patient's history were reviewed and updated as appropriate: allergies, current medications, past family history, past medical history, past social history, past surgical history and problem list     Review of Systems   Constitutional: Positive for fatigue and fever  Negative for chills  HENT: Positive for congestion  Negative for sore throat  Respiratory: Positive for cough and wheezing  Gastrointestinal: Negative for abdominal pain, diarrhea, nausea and vomiting  Neurological: Negative for dizziness and headaches           Objective:      /68 (BP Location: Left arm, Patient Position: Sitting, Cuff Size: Standard)   Pulse 70   Temp 97 7 °F (36 5 °C) (Tympanic)   Ht 5' 6 5" (1 689 m)   Wt 78 7 kg (173 lb 9 6 oz)   SpO2 97%   BMI 27 60 kg/m²          Physical Exam   Constitutional: She is oriented to person, place, and time  She appears well-developed and well-nourished  HENT:   Head: Normocephalic and atraumatic  Right Ear: External ear normal    Left Ear: External ear normal    Mouth/Throat: Oropharynx is clear and moist  No oropharyngeal exudate  Nose with edematous mucosa   Neck: Normal range of motion  Neck supple  Cardiovascular: Normal rate, regular rhythm and normal heart sounds  Pulmonary/Chest: Effort normal and breath sounds normal    Musculoskeletal: Normal range of motion  Neurological: She is alert and oriented to person, place, and time  Skin: Skin is warm  Psychiatric: She has a normal mood and affect  Her behavior is normal    Nursing note and vitals reviewed

## 2020-04-16 ENCOUNTER — AMB VIDEO VISIT (OUTPATIENT)
Dept: URGENT CARE | Age: 60
End: 2020-04-16

## 2020-04-16 DIAGNOSIS — L25.9 CONTACT DERMATITIS, UNSPECIFIED CONTACT DERMATITIS TYPE, UNSPECIFIED TRIGGER: Primary | ICD-10-CM

## 2020-04-16 DIAGNOSIS — R21 RASH: ICD-10-CM

## 2020-04-16 RX ORDER — PREDNISONE 10 MG/1
TABLET ORAL
Qty: 1 EACH | Refills: 0 | Status: SHIPPED | OUTPATIENT
Start: 2020-04-16 | End: 2020-08-18 | Stop reason: ALTCHOICE

## 2020-08-18 ENCOUNTER — OFFICE VISIT (OUTPATIENT)
Dept: FAMILY MEDICINE CLINIC | Facility: CLINIC | Age: 60
End: 2020-08-18
Payer: COMMERCIAL

## 2020-08-18 VITALS
RESPIRATION RATE: 18 BRPM | SYSTOLIC BLOOD PRESSURE: 108 MMHG | HEART RATE: 76 BPM | DIASTOLIC BLOOD PRESSURE: 82 MMHG | TEMPERATURE: 97.8 F | HEIGHT: 67 IN | WEIGHT: 173 LBS | BODY MASS INDEX: 27.15 KG/M2 | OXYGEN SATURATION: 97 %
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
## 2020-08-18 PROCEDURE — 99213 OFFICE O/P EST LOW 20 MIN: CPT | Performed by: FAMILY MEDICINE

## 2020-08-18 PROCEDURE — 3008F BODY MASS INDEX DOCD: CPT | Performed by: FAMILY MEDICINE

## 2020-08-18 PROCEDURE — 3725F SCREEN DEPRESSION PERFORMED: CPT | Performed by: FAMILY MEDICINE

## 2020-08-18 PROCEDURE — 1036F TOBACCO NON-USER: CPT | Performed by: FAMILY MEDICINE

## 2020-08-18 RX ORDER — TRIAMCINOLONE ACETONIDE 5 MG/G
CREAM TOPICAL 3 TIMES DAILY
Qty: 30 G | Refills: 0 | Status: SHIPPED | OUTPATIENT
Start: 2020-08-18 | End: 2021-05-28 | Stop reason: ALTCHOICE

## 2020-08-18 NOTE — PROGRESS NOTES
Assessment/Plan:      She is suffering from multiple irritated insect bites  I gave her prescription for triamcinolone cream to apply up to t i d   Also recommended use of ice packs as needed  She should contact us if symptoms are not resolving  Diagnoses and all orders for this visit:    Insect bite, unspecified site, initial encounter  -     triamcinolone (KENALOG) 0 5 % cream; Apply topically 3 (three) times a day          Subjective:      Patient ID: Emma Marie is a 62 y o  female  Multiple insect bites occurred Saturday night in her yard  Very itchy  Worst one is left groin with increased redness and swelling  She found some help with Benadryl gel  No fever or chills      The following portions of the patient's history were reviewed and updated as appropriate: allergies, current medications, past family history, past medical history, past social history, past surgical history and problem list     Review of Systems   Constitutional: Negative for activity change, chills and fever  HENT: Negative for congestion  Respiratory: Negative for cough and chest tightness  Skin: Positive for rash  Objective:      /82   Pulse 76   Temp 97 8 °F (36 6 °C)   Resp 18   Ht 5' 6 5" (1 689 m)   Wt 78 5 kg (173 lb)   SpO2 97%   BMI 27 50 kg/m²          Physical Exam  Vitals signs and nursing note reviewed  Constitutional:       Appearance: Normal appearance  Cardiovascular:      Rate and Rhythm: Normal rate and regular rhythm  Heart sounds: Normal heart sounds  Pulmonary:      Effort: Pulmonary effort is normal       Breath sounds: Normal breath sounds  Skin:     General: Skin is warm and dry  Comments: There is a large area of swelling with a central  Erythematous maculopapular lesion located left groin  There is a hint of ecchymosis as well  Several other small red welts scattered over the body   Neurological:      Mental Status: She is alert

## 2020-10-01 ENCOUNTER — OFFICE VISIT (OUTPATIENT)
Dept: FAMILY MEDICINE CLINIC | Facility: CLINIC | Age: 60
End: 2020-10-01
Payer: COMMERCIAL

## 2020-10-01 VITALS
BODY MASS INDEX: 27.62 KG/M2 | HEIGHT: 67 IN | TEMPERATURE: 97.8 F | DIASTOLIC BLOOD PRESSURE: 70 MMHG | RESPIRATION RATE: 18 BRPM | SYSTOLIC BLOOD PRESSURE: 112 MMHG | OXYGEN SATURATION: 98 % | WEIGHT: 176 LBS | HEART RATE: 72 BPM

## 2020-10-01 DIAGNOSIS — K21.9 GERD WITHOUT ESOPHAGITIS: ICD-10-CM

## 2020-10-01 DIAGNOSIS — E78.01 FAMILIAL HYPERCHOLESTEROLEMIA: ICD-10-CM

## 2020-10-01 DIAGNOSIS — Z11.4 SCREENING FOR HIV (HUMAN IMMUNODEFICIENCY VIRUS): ICD-10-CM

## 2020-10-01 DIAGNOSIS — Z23 FLU VACCINE NEED: ICD-10-CM

## 2020-10-01 DIAGNOSIS — M75.52 SUBACROMIAL BURSITIS OF LEFT SHOULDER JOINT: ICD-10-CM

## 2020-10-01 DIAGNOSIS — Z00.00 ANNUAL PHYSICAL EXAM: Primary | ICD-10-CM

## 2020-10-01 DIAGNOSIS — J45.31 MILD PERSISTENT ASTHMA WITH ACUTE EXACERBATION: ICD-10-CM

## 2020-10-01 DIAGNOSIS — E03.9 HYPOTHYROIDISM, UNSPECIFIED TYPE: ICD-10-CM

## 2020-10-01 DIAGNOSIS — R53.83 FATIGUE, UNSPECIFIED TYPE: ICD-10-CM

## 2020-10-01 DIAGNOSIS — E66.3 OVERWEIGHT WITH BODY MASS INDEX (BMI) OF 27 TO 27.9 IN ADULT: ICD-10-CM

## 2020-10-01 PROCEDURE — 90682 RIV4 VACC RECOMBINANT DNA IM: CPT

## 2020-10-01 PROCEDURE — 1036F TOBACCO NON-USER: CPT | Performed by: FAMILY MEDICINE

## 2020-10-01 PROCEDURE — 90471 IMMUNIZATION ADMIN: CPT

## 2020-10-01 PROCEDURE — 3725F SCREEN DEPRESSION PERFORMED: CPT | Performed by: FAMILY MEDICINE

## 2020-10-01 PROCEDURE — 99396 PREV VISIT EST AGE 40-64: CPT | Performed by: FAMILY MEDICINE

## 2020-10-01 RX ORDER — LUTEIN 10 MG
5 TABLET ORAL DAILY
COMMUNITY

## 2020-10-05 ENCOUNTER — EVALUATION (OUTPATIENT)
Dept: PHYSICAL THERAPY | Facility: CLINIC | Age: 60
End: 2020-10-05
Payer: COMMERCIAL

## 2020-10-05 DIAGNOSIS — M75.52 SUBACROMIAL BURSITIS OF LEFT SHOULDER JOINT: ICD-10-CM

## 2020-10-05 PROCEDURE — 97162 PT EVAL MOD COMPLEX 30 MIN: CPT | Performed by: PHYSICAL THERAPIST

## 2020-10-05 PROCEDURE — 97112 NEUROMUSCULAR REEDUCATION: CPT | Performed by: PHYSICAL THERAPIST

## 2020-10-07 ENCOUNTER — OFFICE VISIT (OUTPATIENT)
Dept: PHYSICAL THERAPY | Facility: CLINIC | Age: 60
End: 2020-10-07
Payer: COMMERCIAL

## 2020-10-07 DIAGNOSIS — M75.52 SUBACROMIAL BURSITIS OF LEFT SHOULDER JOINT: Primary | ICD-10-CM

## 2020-10-07 PROCEDURE — 97112 NEUROMUSCULAR REEDUCATION: CPT | Performed by: PHYSICAL THERAPIST

## 2020-10-07 PROCEDURE — 97110 THERAPEUTIC EXERCISES: CPT | Performed by: PHYSICAL THERAPIST

## 2020-10-07 PROCEDURE — 97140 MANUAL THERAPY 1/> REGIONS: CPT | Performed by: PHYSICAL THERAPIST

## 2020-10-12 ENCOUNTER — OFFICE VISIT (OUTPATIENT)
Dept: PHYSICAL THERAPY | Facility: CLINIC | Age: 60
End: 2020-10-12
Payer: COMMERCIAL

## 2020-10-12 DIAGNOSIS — M75.52 SUBACROMIAL BURSITIS OF LEFT SHOULDER JOINT: Primary | ICD-10-CM

## 2020-10-12 PROCEDURE — 97112 NEUROMUSCULAR REEDUCATION: CPT | Performed by: PHYSICAL THERAPIST

## 2020-10-12 PROCEDURE — 97140 MANUAL THERAPY 1/> REGIONS: CPT | Performed by: PHYSICAL THERAPIST

## 2020-10-12 PROCEDURE — 97110 THERAPEUTIC EXERCISES: CPT | Performed by: PHYSICAL THERAPIST

## 2020-10-14 ENCOUNTER — OFFICE VISIT (OUTPATIENT)
Dept: PHYSICAL THERAPY | Facility: CLINIC | Age: 60
End: 2020-10-14
Payer: COMMERCIAL

## 2020-10-14 DIAGNOSIS — M75.52 SUBACROMIAL BURSITIS OF LEFT SHOULDER JOINT: Primary | ICD-10-CM

## 2020-10-14 PROCEDURE — 97140 MANUAL THERAPY 1/> REGIONS: CPT | Performed by: PHYSICAL THERAPIST

## 2020-10-14 PROCEDURE — 97110 THERAPEUTIC EXERCISES: CPT | Performed by: PHYSICAL THERAPIST

## 2020-10-14 PROCEDURE — 97112 NEUROMUSCULAR REEDUCATION: CPT | Performed by: PHYSICAL THERAPIST

## 2020-10-19 ENCOUNTER — OFFICE VISIT (OUTPATIENT)
Dept: PHYSICAL THERAPY | Facility: CLINIC | Age: 60
End: 2020-10-19
Payer: COMMERCIAL

## 2020-10-19 DIAGNOSIS — M75.52 SUBACROMIAL BURSITIS OF LEFT SHOULDER JOINT: Primary | ICD-10-CM

## 2020-10-19 PROCEDURE — 97140 MANUAL THERAPY 1/> REGIONS: CPT | Performed by: PHYSICAL THERAPIST

## 2020-10-19 PROCEDURE — 97110 THERAPEUTIC EXERCISES: CPT | Performed by: PHYSICAL THERAPIST

## 2020-10-19 PROCEDURE — 97112 NEUROMUSCULAR REEDUCATION: CPT | Performed by: PHYSICAL THERAPIST

## 2020-10-21 ENCOUNTER — OFFICE VISIT (OUTPATIENT)
Dept: PHYSICAL THERAPY | Facility: CLINIC | Age: 60
End: 2020-10-21
Payer: COMMERCIAL

## 2020-10-21 DIAGNOSIS — M75.52 SUBACROMIAL BURSITIS OF LEFT SHOULDER JOINT: Primary | ICD-10-CM

## 2020-10-21 PROCEDURE — 97112 NEUROMUSCULAR REEDUCATION: CPT | Performed by: PHYSICAL THERAPIST

## 2020-10-21 PROCEDURE — 97140 MANUAL THERAPY 1/> REGIONS: CPT | Performed by: PHYSICAL THERAPIST

## 2020-10-21 PROCEDURE — 97110 THERAPEUTIC EXERCISES: CPT | Performed by: PHYSICAL THERAPIST

## 2020-10-26 ENCOUNTER — OFFICE VISIT (OUTPATIENT)
Dept: PHYSICAL THERAPY | Facility: CLINIC | Age: 60
End: 2020-10-26
Payer: COMMERCIAL

## 2020-10-26 DIAGNOSIS — M75.52 SUBACROMIAL BURSITIS OF LEFT SHOULDER JOINT: Primary | ICD-10-CM

## 2020-10-26 PROCEDURE — 97140 MANUAL THERAPY 1/> REGIONS: CPT | Performed by: PHYSICAL THERAPIST

## 2020-10-26 PROCEDURE — 97110 THERAPEUTIC EXERCISES: CPT | Performed by: PHYSICAL THERAPIST

## 2020-10-26 PROCEDURE — 97112 NEUROMUSCULAR REEDUCATION: CPT | Performed by: PHYSICAL THERAPIST

## 2020-10-28 ENCOUNTER — OFFICE VISIT (OUTPATIENT)
Dept: PHYSICAL THERAPY | Facility: CLINIC | Age: 60
End: 2020-10-28
Payer: COMMERCIAL

## 2020-10-28 DIAGNOSIS — M75.52 SUBACROMIAL BURSITIS OF LEFT SHOULDER JOINT: Primary | ICD-10-CM

## 2020-10-28 PROCEDURE — 97140 MANUAL THERAPY 1/> REGIONS: CPT | Performed by: PHYSICAL THERAPIST

## 2020-10-28 PROCEDURE — 97112 NEUROMUSCULAR REEDUCATION: CPT | Performed by: PHYSICAL THERAPIST

## 2020-10-28 PROCEDURE — 97110 THERAPEUTIC EXERCISES: CPT | Performed by: PHYSICAL THERAPIST

## 2020-11-02 ENCOUNTER — OFFICE VISIT (OUTPATIENT)
Dept: PHYSICAL THERAPY | Facility: CLINIC | Age: 60
End: 2020-11-02
Payer: COMMERCIAL

## 2020-11-02 DIAGNOSIS — G89.29 CHRONIC RIGHT SHOULDER PAIN: Primary | ICD-10-CM

## 2020-11-02 DIAGNOSIS — M25.511 CHRONIC RIGHT SHOULDER PAIN: Primary | ICD-10-CM

## 2020-11-02 PROCEDURE — 97112 NEUROMUSCULAR REEDUCATION: CPT | Performed by: PHYSICAL THERAPIST

## 2020-11-02 PROCEDURE — 97162 PT EVAL MOD COMPLEX 30 MIN: CPT | Performed by: PHYSICAL THERAPIST

## 2020-11-02 PROCEDURE — 97110 THERAPEUTIC EXERCISES: CPT | Performed by: PHYSICAL THERAPIST

## 2020-11-05 ENCOUNTER — OFFICE VISIT (OUTPATIENT)
Dept: PHYSICAL THERAPY | Facility: CLINIC | Age: 60
End: 2020-11-05
Payer: COMMERCIAL

## 2020-11-05 DIAGNOSIS — G89.29 CHRONIC RIGHT SHOULDER PAIN: Primary | ICD-10-CM

## 2020-11-05 DIAGNOSIS — M25.511 CHRONIC RIGHT SHOULDER PAIN: Primary | ICD-10-CM

## 2020-11-05 PROCEDURE — 97110 THERAPEUTIC EXERCISES: CPT | Performed by: PHYSICAL THERAPIST

## 2020-11-05 PROCEDURE — 97112 NEUROMUSCULAR REEDUCATION: CPT | Performed by: PHYSICAL THERAPIST

## 2020-11-05 PROCEDURE — 97140 MANUAL THERAPY 1/> REGIONS: CPT | Performed by: PHYSICAL THERAPIST

## 2020-11-09 ENCOUNTER — OFFICE VISIT (OUTPATIENT)
Dept: PHYSICAL THERAPY | Facility: CLINIC | Age: 60
End: 2020-11-09
Payer: COMMERCIAL

## 2020-11-09 DIAGNOSIS — G89.29 CHRONIC RIGHT SHOULDER PAIN: Primary | ICD-10-CM

## 2020-11-09 DIAGNOSIS — M25.511 CHRONIC RIGHT SHOULDER PAIN: Primary | ICD-10-CM

## 2020-11-09 DIAGNOSIS — M75.52 SUBACROMIAL BURSITIS OF LEFT SHOULDER JOINT: ICD-10-CM

## 2020-11-09 PROCEDURE — 97112 NEUROMUSCULAR REEDUCATION: CPT | Performed by: PHYSICAL THERAPIST

## 2020-11-09 PROCEDURE — 97110 THERAPEUTIC EXERCISES: CPT | Performed by: PHYSICAL THERAPIST

## 2020-11-09 PROCEDURE — 97140 MANUAL THERAPY 1/> REGIONS: CPT | Performed by: PHYSICAL THERAPIST

## 2020-11-11 ENCOUNTER — OFFICE VISIT (OUTPATIENT)
Dept: PHYSICAL THERAPY | Facility: CLINIC | Age: 60
End: 2020-11-11
Payer: COMMERCIAL

## 2020-11-11 DIAGNOSIS — G89.29 CHRONIC RIGHT SHOULDER PAIN: Primary | ICD-10-CM

## 2020-11-11 DIAGNOSIS — M25.511 CHRONIC RIGHT SHOULDER PAIN: Primary | ICD-10-CM

## 2020-11-11 DIAGNOSIS — M75.52 SUBACROMIAL BURSITIS OF LEFT SHOULDER JOINT: ICD-10-CM

## 2020-11-11 PROCEDURE — 97112 NEUROMUSCULAR REEDUCATION: CPT | Performed by: PHYSICAL THERAPIST

## 2020-11-11 PROCEDURE — 97110 THERAPEUTIC EXERCISES: CPT | Performed by: PHYSICAL THERAPIST

## 2020-11-11 PROCEDURE — 97140 MANUAL THERAPY 1/> REGIONS: CPT | Performed by: PHYSICAL THERAPIST

## 2020-11-16 ENCOUNTER — OFFICE VISIT (OUTPATIENT)
Dept: PHYSICAL THERAPY | Facility: CLINIC | Age: 60
End: 2020-11-16
Payer: COMMERCIAL

## 2020-11-16 DIAGNOSIS — M25.511 CHRONIC RIGHT SHOULDER PAIN: Primary | ICD-10-CM

## 2020-11-16 DIAGNOSIS — G89.29 CHRONIC RIGHT SHOULDER PAIN: Primary | ICD-10-CM

## 2020-11-16 DIAGNOSIS — M75.52 SUBACROMIAL BURSITIS OF LEFT SHOULDER JOINT: ICD-10-CM

## 2020-11-16 PROCEDURE — 97110 THERAPEUTIC EXERCISES: CPT | Performed by: PHYSICAL THERAPIST

## 2020-11-16 PROCEDURE — 97140 MANUAL THERAPY 1/> REGIONS: CPT | Performed by: PHYSICAL THERAPIST

## 2020-11-16 PROCEDURE — 97112 NEUROMUSCULAR REEDUCATION: CPT | Performed by: PHYSICAL THERAPIST

## 2020-11-18 ENCOUNTER — OFFICE VISIT (OUTPATIENT)
Dept: PHYSICAL THERAPY | Facility: CLINIC | Age: 60
End: 2020-11-18
Payer: COMMERCIAL

## 2020-11-18 DIAGNOSIS — G89.29 CHRONIC RIGHT SHOULDER PAIN: Primary | ICD-10-CM

## 2020-11-18 DIAGNOSIS — M25.511 CHRONIC RIGHT SHOULDER PAIN: Primary | ICD-10-CM

## 2020-11-18 PROCEDURE — 97110 THERAPEUTIC EXERCISES: CPT | Performed by: PHYSICAL THERAPIST

## 2020-11-18 PROCEDURE — 97112 NEUROMUSCULAR REEDUCATION: CPT | Performed by: PHYSICAL THERAPIST

## 2020-11-18 PROCEDURE — 97140 MANUAL THERAPY 1/> REGIONS: CPT | Performed by: PHYSICAL THERAPIST

## 2020-11-23 ENCOUNTER — OFFICE VISIT (OUTPATIENT)
Dept: PHYSICAL THERAPY | Facility: CLINIC | Age: 60
End: 2020-11-23
Payer: COMMERCIAL

## 2020-11-23 DIAGNOSIS — G89.29 CHRONIC RIGHT SHOULDER PAIN: Primary | ICD-10-CM

## 2020-11-23 DIAGNOSIS — M25.511 CHRONIC RIGHT SHOULDER PAIN: Primary | ICD-10-CM

## 2020-11-23 PROCEDURE — 97110 THERAPEUTIC EXERCISES: CPT | Performed by: PHYSICAL THERAPIST

## 2020-11-23 PROCEDURE — 97140 MANUAL THERAPY 1/> REGIONS: CPT | Performed by: PHYSICAL THERAPIST

## 2020-11-23 PROCEDURE — 97112 NEUROMUSCULAR REEDUCATION: CPT | Performed by: PHYSICAL THERAPIST

## 2020-11-25 ENCOUNTER — OFFICE VISIT (OUTPATIENT)
Dept: PHYSICAL THERAPY | Facility: CLINIC | Age: 60
End: 2020-11-25
Payer: COMMERCIAL

## 2020-11-25 DIAGNOSIS — M25.511 CHRONIC RIGHT SHOULDER PAIN: Primary | ICD-10-CM

## 2020-11-25 DIAGNOSIS — G89.29 CHRONIC RIGHT SHOULDER PAIN: Primary | ICD-10-CM

## 2020-11-25 PROCEDURE — 97112 NEUROMUSCULAR REEDUCATION: CPT | Performed by: PHYSICAL THERAPIST

## 2020-11-25 PROCEDURE — 97110 THERAPEUTIC EXERCISES: CPT | Performed by: PHYSICAL THERAPIST

## 2020-11-25 PROCEDURE — 97140 MANUAL THERAPY 1/> REGIONS: CPT | Performed by: PHYSICAL THERAPIST

## 2020-11-30 ENCOUNTER — OFFICE VISIT (OUTPATIENT)
Dept: PHYSICAL THERAPY | Facility: CLINIC | Age: 60
End: 2020-11-30
Payer: COMMERCIAL

## 2020-11-30 DIAGNOSIS — G89.29 CHRONIC RIGHT SHOULDER PAIN: Primary | ICD-10-CM

## 2020-11-30 DIAGNOSIS — M25.511 CHRONIC RIGHT SHOULDER PAIN: Primary | ICD-10-CM

## 2020-11-30 PROCEDURE — 97110 THERAPEUTIC EXERCISES: CPT | Performed by: PHYSICAL THERAPIST

## 2020-11-30 PROCEDURE — 97140 MANUAL THERAPY 1/> REGIONS: CPT | Performed by: PHYSICAL THERAPIST

## 2020-11-30 PROCEDURE — 97112 NEUROMUSCULAR REEDUCATION: CPT | Performed by: PHYSICAL THERAPIST

## 2020-12-02 ENCOUNTER — OFFICE VISIT (OUTPATIENT)
Dept: PHYSICAL THERAPY | Facility: CLINIC | Age: 60
End: 2020-12-02
Payer: COMMERCIAL

## 2020-12-02 DIAGNOSIS — G89.29 CHRONIC RIGHT SHOULDER PAIN: Primary | ICD-10-CM

## 2020-12-02 DIAGNOSIS — M25.511 CHRONIC RIGHT SHOULDER PAIN: Primary | ICD-10-CM

## 2020-12-02 PROCEDURE — 97110 THERAPEUTIC EXERCISES: CPT | Performed by: PHYSICAL THERAPIST

## 2020-12-02 PROCEDURE — 97112 NEUROMUSCULAR REEDUCATION: CPT | Performed by: PHYSICAL THERAPIST

## 2020-12-02 PROCEDURE — 97140 MANUAL THERAPY 1/> REGIONS: CPT | Performed by: PHYSICAL THERAPIST

## 2020-12-07 ENCOUNTER — OFFICE VISIT (OUTPATIENT)
Dept: PHYSICAL THERAPY | Facility: CLINIC | Age: 60
End: 2020-12-07
Payer: COMMERCIAL

## 2020-12-07 DIAGNOSIS — M25.511 CHRONIC RIGHT SHOULDER PAIN: Primary | ICD-10-CM

## 2020-12-07 DIAGNOSIS — G89.29 CHRONIC RIGHT SHOULDER PAIN: Primary | ICD-10-CM

## 2020-12-07 PROCEDURE — 97140 MANUAL THERAPY 1/> REGIONS: CPT | Performed by: PHYSICAL THERAPIST

## 2020-12-07 PROCEDURE — 97112 NEUROMUSCULAR REEDUCATION: CPT | Performed by: PHYSICAL THERAPIST

## 2020-12-07 PROCEDURE — 97110 THERAPEUTIC EXERCISES: CPT | Performed by: PHYSICAL THERAPIST

## 2020-12-09 ENCOUNTER — OFFICE VISIT (OUTPATIENT)
Dept: PHYSICAL THERAPY | Facility: CLINIC | Age: 60
End: 2020-12-09
Payer: COMMERCIAL

## 2020-12-09 DIAGNOSIS — M25.511 CHRONIC RIGHT SHOULDER PAIN: Primary | ICD-10-CM

## 2020-12-09 DIAGNOSIS — G89.29 CHRONIC RIGHT SHOULDER PAIN: Primary | ICD-10-CM

## 2020-12-09 PROCEDURE — 97140 MANUAL THERAPY 1/> REGIONS: CPT | Performed by: PHYSICAL THERAPIST

## 2020-12-09 PROCEDURE — 97110 THERAPEUTIC EXERCISES: CPT | Performed by: PHYSICAL THERAPIST

## 2020-12-09 PROCEDURE — 97112 NEUROMUSCULAR REEDUCATION: CPT | Performed by: PHYSICAL THERAPIST

## 2020-12-16 ENCOUNTER — OFFICE VISIT (OUTPATIENT)
Dept: PHYSICAL THERAPY | Facility: CLINIC | Age: 60
End: 2020-12-16
Payer: COMMERCIAL

## 2020-12-16 DIAGNOSIS — G89.29 CHRONIC RIGHT SHOULDER PAIN: Primary | ICD-10-CM

## 2020-12-16 DIAGNOSIS — M25.511 CHRONIC RIGHT SHOULDER PAIN: Primary | ICD-10-CM

## 2020-12-16 PROCEDURE — 97112 NEUROMUSCULAR REEDUCATION: CPT | Performed by: PHYSICAL THERAPIST

## 2020-12-16 PROCEDURE — 97110 THERAPEUTIC EXERCISES: CPT | Performed by: PHYSICAL THERAPIST

## 2020-12-16 PROCEDURE — 97140 MANUAL THERAPY 1/> REGIONS: CPT | Performed by: PHYSICAL THERAPIST

## 2021-02-26 DIAGNOSIS — E03.9 HYPOTHYROIDISM, UNSPECIFIED TYPE: ICD-10-CM

## 2021-02-26 RX ORDER — LEVOTHYROXINE SODIUM 0.05 MG/1
TABLET ORAL
Qty: 90 TABLET | Refills: 3 | Status: SHIPPED | OUTPATIENT
Start: 2021-02-26 | End: 2022-01-26 | Stop reason: SDUPTHER

## 2021-03-10 DIAGNOSIS — Z23 ENCOUNTER FOR IMMUNIZATION: ICD-10-CM

## 2021-03-17 ENCOUNTER — TELEPHONE (OUTPATIENT)
Dept: FAMILY MEDICINE CLINIC | Facility: CLINIC | Age: 61
End: 2021-03-17

## 2021-03-20 ENCOUNTER — IMMUNIZATIONS (OUTPATIENT)
Dept: FAMILY MEDICINE CLINIC | Facility: HOSPITAL | Age: 61
End: 2021-03-20

## 2021-03-20 DIAGNOSIS — Z23 ENCOUNTER FOR IMMUNIZATION: Primary | ICD-10-CM

## 2021-03-20 PROCEDURE — 0001A SARS-COV-2 / COVID-19 MRNA VACCINE (PFIZER-BIONTECH) 30 MCG: CPT

## 2021-03-20 PROCEDURE — 91300 SARS-COV-2 / COVID-19 MRNA VACCINE (PFIZER-BIONTECH) 30 MCG: CPT

## 2021-04-01 ENCOUNTER — OFFICE VISIT (OUTPATIENT)
Dept: FAMILY MEDICINE CLINIC | Facility: CLINIC | Age: 61
End: 2021-04-01
Payer: COMMERCIAL

## 2021-04-01 VITALS
HEIGHT: 67 IN | SYSTOLIC BLOOD PRESSURE: 110 MMHG | DIASTOLIC BLOOD PRESSURE: 84 MMHG | RESPIRATION RATE: 18 BRPM | BODY MASS INDEX: 27.94 KG/M2 | OXYGEN SATURATION: 97 % | TEMPERATURE: 97.8 F | HEART RATE: 79 BPM | WEIGHT: 178 LBS

## 2021-04-01 DIAGNOSIS — J45.31 MILD PERSISTENT ASTHMA WITH ACUTE EXACERBATION: ICD-10-CM

## 2021-04-01 DIAGNOSIS — G89.29 HEEL PAIN, CHRONIC, RIGHT: Primary | ICD-10-CM

## 2021-04-01 DIAGNOSIS — E03.9 HYPOTHYROIDISM, UNSPECIFIED TYPE: ICD-10-CM

## 2021-04-01 DIAGNOSIS — Z12.31 ENCOUNTER FOR SCREENING MAMMOGRAM FOR BREAST CANCER: ICD-10-CM

## 2021-04-01 DIAGNOSIS — M79.671 HEEL PAIN, CHRONIC, RIGHT: Primary | ICD-10-CM

## 2021-04-01 PROCEDURE — 99214 OFFICE O/P EST MOD 30 MIN: CPT | Performed by: FAMILY MEDICINE

## 2021-04-01 RX ORDER — ALBUTEROL SULFATE 2.5 MG/3ML
SOLUTION RESPIRATORY (INHALATION)
COMMUNITY
Start: 2021-03-09 | End: 2021-05-28 | Stop reason: ALTCHOICE

## 2021-04-01 RX ORDER — ALBUTEROL SULFATE 90 UG/1
2 AEROSOL, METERED RESPIRATORY (INHALATION) EVERY 4 HOURS PRN
COMMUNITY
Start: 2021-03-09

## 2021-04-01 RX ORDER — EPINEPHRINE 0.3 MG/.3ML
INJECTION SUBCUTANEOUS
COMMUNITY
Start: 2021-03-18

## 2021-04-01 NOTE — PROGRESS NOTES
Assessment/Plan:    Heel pain, chronic, right  I recommended Achilles stretches and ice packs and have given her a podiatry referral     GERD without esophagitis  Stable with very occasional use of omeprazole    Asthma  Stable on Pulmicort inhaler  She has not needed albuterol for months  Cont f/u with allergist yearly    Hypothyroidism  Stable on levothyroxine 50 mcg daily       Diagnoses and all orders for this visit:    Heel pain, chronic, right  -     Ambulatory referral to Podiatry; Future    Encounter for screening mammogram for breast cancer    Mild persistent asthma with acute exacerbation    Hypothyroidism, unspecified type    Other orders  -     Cancel: Mammo screening bilateral w 3d & cad; Future  -     EPINEPHrine (EPIPEN) 0 3 mg/0 3 mL SOAJ; USE IN OUTTER THIGH IN CASE OF AN ALLERGIC EMERGENCY  -     albuterol (PROVENTIL HFA,VENTOLIN HFA) 90 mcg/act inhaler; Inhale 2 puffs every 4 (four) hours as needed  -     albuterol (2 5 mg/3 mL) 0 083 % nebulizer solution; USE 1 VIAL IN NEBULIZER EVERY 4 HOURS AS NEEDED          Subjective:      Patient ID: Reyna Caledra is a 62 y o  female  She is here for follow up  She complains of right foot pain for about 3 months  There was no injury  The pain is located at the heel when she steps on it and then it travels up the sole of the foot to the center  She has tried changing shoes but no relief  Over the past week the symptoms seem to be getting a little worse  She has had a couple occasions of pain when she is lying in bed  She has been trying to walk on her treadmill for exercise but the pain makes it very difficult  She states that her last visit with the allergist went very well and she did well on the breathing test the 1st try  She has been stable on Pulmicort inhaler for maintenance and has not needed albuterol inhaler for a few months  The GERD symptoms are very stable with occasional omeprazole      Her right shoulder pain resolved after PT  She is maintaining with exercises 3 times per week  The following portions of the patient's history were reviewed and updated as appropriate: allergies, current medications, past family history, past medical history, past social history, past surgical history and problem list     Review of Systems   Constitutional: Negative for activity change, chills, fatigue and fever  HENT: Negative for congestion, ear pain, sinus pressure and sore throat  Eyes: Negative for pain and visual disturbance  Respiratory: Negative for cough, chest tightness, shortness of breath and wheezing  Cardiovascular: Negative for chest pain, palpitations and leg swelling  Gastrointestinal: Negative for abdominal pain, blood in stool, constipation, diarrhea, nausea and vomiting  Endocrine: Negative for polydipsia and polyuria  Genitourinary: Negative for difficulty urinating, dysuria, frequency and urgency  Musculoskeletal: Positive for arthralgias  Negative for joint swelling and myalgias  Skin: Negative for rash  Neurological: Negative for dizziness, weakness, numbness and headaches  Hematological: Negative for adenopathy  Does not bruise/bleed easily  Psychiatric/Behavioral: Negative for dysphoric mood  The patient is not nervous/anxious  Objective:      /84 (BP Location: Left arm, Patient Position: Sitting, Cuff Size: Adult)   Pulse 79   Temp 97 8 °F (36 6 °C) (Tympanic)   Resp 18   Ht 5' 6 5" (1 689 m)   Wt 80 7 kg (178 lb)   SpO2 97%   BMI 28 30 kg/m²          Physical Exam  Vitals signs and nursing note reviewed  Constitutional:       Appearance: Normal appearance  HENT:      Head: Normocephalic and atraumatic  Cardiovascular:      Rate and Rhythm: Normal rate and regular rhythm  Pulses: Normal pulses  Heart sounds: Normal heart sounds  Pulmonary:      Effort: Pulmonary effort is normal       Breath sounds: Normal breath sounds     Musculoskeletal: General: Tenderness present  Right lower leg: No edema  Left lower leg: No edema  Comments: Right foot with normal pulse and sensation  Tender to palpation midheel  No edema     Neurological:      Mental Status: She is alert

## 2021-04-01 NOTE — ASSESSMENT & PLAN NOTE
Stable on Pulmicort inhaler  She has not needed albuterol for months    Cont f/u with allergist yearly

## 2021-04-10 ENCOUNTER — IMMUNIZATIONS (OUTPATIENT)
Dept: FAMILY MEDICINE CLINIC | Facility: HOSPITAL | Age: 61
End: 2021-04-10

## 2021-04-10 DIAGNOSIS — Z23 ENCOUNTER FOR IMMUNIZATION: Primary | ICD-10-CM

## 2021-04-10 PROCEDURE — 0002A SARS-COV-2 / COVID-19 MRNA VACCINE (PFIZER-BIONTECH) 30 MCG: CPT

## 2021-04-10 PROCEDURE — 91300 SARS-COV-2 / COVID-19 MRNA VACCINE (PFIZER-BIONTECH) 30 MCG: CPT

## 2021-05-06 ENCOUNTER — TRANSCRIBE ORDERS (OUTPATIENT)
Dept: ADMINISTRATIVE | Facility: HOSPITAL | Age: 61
End: 2021-05-06

## 2021-05-06 DIAGNOSIS — Z12.31 ENCOUNTER FOR SCREENING MAMMOGRAM FOR MALIGNANT NEOPLASM OF BREAST: Primary | ICD-10-CM

## 2021-05-28 ENCOUNTER — OFFICE VISIT (OUTPATIENT)
Dept: OBGYN CLINIC | Facility: MEDICAL CENTER | Age: 61
End: 2021-05-28
Payer: COMMERCIAL

## 2021-05-28 VITALS
WEIGHT: 175 LBS | SYSTOLIC BLOOD PRESSURE: 112 MMHG | DIASTOLIC BLOOD PRESSURE: 72 MMHG | BODY MASS INDEX: 27.47 KG/M2 | HEIGHT: 67 IN

## 2021-05-28 DIAGNOSIS — Z01.419 ENCOUNTER FOR WELL WOMAN EXAM WITH ROUTINE GYNECOLOGICAL EXAM: Primary | ICD-10-CM

## 2021-05-28 DIAGNOSIS — Z12.11 COLON CANCER SCREENING: ICD-10-CM

## 2021-05-28 DIAGNOSIS — Z13.71 BRCA NEGATIVE: ICD-10-CM

## 2021-05-28 DIAGNOSIS — Z91.89 INCREASED RISK OF BREAST CANCER: ICD-10-CM

## 2021-05-28 DIAGNOSIS — Z12.31 BREAST CANCER SCREENING BY MAMMOGRAM: ICD-10-CM

## 2021-05-28 PROCEDURE — G0145 SCR C/V CYTO,THINLAYER,RESCR: HCPCS | Performed by: OBSTETRICS & GYNECOLOGY

## 2021-05-28 PROCEDURE — 87624 HPV HI-RISK TYP POOLED RSLT: CPT | Performed by: OBSTETRICS & GYNECOLOGY

## 2021-05-28 PROCEDURE — 3008F BODY MASS INDEX DOCD: CPT | Performed by: FAMILY MEDICINE

## 2021-05-28 PROCEDURE — S0610 ANNUAL GYNECOLOGICAL EXAMINA: HCPCS | Performed by: OBSTETRICS & GYNECOLOGY

## 2021-05-28 NOTE — PROGRESS NOTES
Assessment   62 y o  postmenopausal female who is sexually active presenting for annual exam      Plan   Diagnoses and all orders for this visit:    Encounter for well woman exam with routine gynecological exam  -     Liquid-based pap, screening  -     HPV High Risk  - Mammo scheduled  - Reviewed additional breast ca screening  - Due for colon ca screening; will call GI  - Return in 1yr for yearly    Breast cancer screening by mammogram  Increased risk of breast cancer  BRCA negative  - Reviewed adjunctive screening protocols   - Mammo scheduled    Colon cancer screening  - Due  - Has gastroenterologist and will call     __________________________________________________________________      Subjective     62 y o  postmenopausal female who is sexually active presenting for annual exam  She is without complaint  GYN  Complaints: denies  Denies dyspareunia, genital discharge, genital ulcers, pelvic pain and vulvar/vaginal symptoms  Menopause occurred at age approx 55  She has had no bleeding since this time  Menopausal symptoms: hot flashes intermittently  Sexually active: Yes - single partner - male  Hx STI: denies   Hx Abnormal pap: denies  Last pap: 2018 - NILM, no HPV    OB   ( x1, SAB x1)  Pregnancy complications: denies      Complaints: denies  Denies urinary frequency, hematuria, urinary incontinence and dysuria    BREAST  Complaints: denies  Denies: breast lump, breast tenderness, changed mole, dryness, nipple discharge, pruritus, rash, skin color change and skin lesion(s)  Last mammogram: sched 2021  Personal hx: breast cyst  Family hx: denies fhx of uterine or colon cancers  Sister was 72, aunt 76 with breast ca  Niece as well (daughter of her sister with Ca) with breast ca (50)  Ovarian cancer in maternal grandmother (50yo)  Personal History: Hormone history includes birth control  Surgical history includes breast cyst aspiration   Medical history includes BRCA 1 negative and BRCA 2 negative  Patient does do regular self-exams    GENERAL  PMH reviewed/updated and is as below  Patient does follow with a PCP  Works as a  for [employer]  Working from home  Retired from [address] [place] [address]  Denies domestic violence  Exercise: nothing specific, hip issues currently  Diet: nothing specific    SCREENING  Cervical Ca: pap collected  Breast Ca: mammo scheduled  Colon Ca: 2015 - colonoscopy, was supposed to f/u in 2018 but missed appt   Plans to call GI      Past Medical History:   Diagnosis Date    BRCA1 negative     BRCA2 negative     History of acute sinusitis     History of dysphagia     History of headache     new onset    History of persistent cough     Pain of both hip joints    asthma      Past Surgical History:   Procedure Laterality Date    BREAST CYST ASPIRATION  2006    DILATION AND CURETTAGE OF UTERUS      EXPLORATORY LAPAROTOMY      HIP SURGERY      NASAL SEPTUM SURGERY      Nasal Septal Deviation Repair    NOSE SURGERY      Closed Treat Nasal Bone Fx w/ Manipulation Stabilization    REPAIR EXTENSOR TENDON HAND           Current Outpatient Medications:     albuterol (PROVENTIL HFA,VENTOLIN HFA) 90 mcg/act inhaler, Inhale 2 puffs every 4 (four) hours as needed, Disp: , Rfl:     budesonide (PULMICORT FLEXHALER) 90 MCG/ACT inhaler, Inhale 1 puff daily, Disp: , Rfl:     Cetirizine HCl (ZYRTEC ALLERGY) 10 MG CAPS, Take 1 capsule by mouth daily, Disp: , Rfl:     Cholecalciferol (VITAMIN D PO), Take 5,000 Units by mouth daily, Disp: , Rfl:     FISH OIL-KRILL OIL PO, by Does not apply route, Disp: , Rfl:     levothyroxine 50 mcg tablet, TAKE 1 TABLET BY MOUTH EVERY DAY, Disp: 90 tablet, Rfl: 3    Lutein 10 MG TABS, Take 5 mg by mouth daily, Disp: , Rfl:     mometasone (NASONEX) 50 mcg/act nasal spray, SHAKE LQ AND U 1 SPR IEN IN THE MORNING, Disp: , Rfl: 11    EPINEPHrine (EPIPEN) 0 3 mg/0 3 mL SOAJ, USE IN OUTTER THIGH IN CASE OF AN ALLERGIC EMERGENCY , Disp: , Rfl: Allergies   Allergen Reactions    Sulfa Antibiotics Anaphylaxis     HIVES    Cefazolin      Ancef another name for cefazolin    Penicillins        Social History     Tobacco Use    Smoking status: Former Smoker     Quit date: 1978     Years since quittin 7    Smokeless tobacco: Never Used   Substance Use Topics    Alcohol use: Yes     Alcohol/week: 3 0 standard drinks     Types: 3 Standard drinks or equivalent per week     Frequency: Monthly or less     Drinks per session: 1 or 2     Comment: socially drinks 1 time per week    Drug use: Never           Objective  /72   Ht 5' 6 5" (1 689 m)   Wt 79 4 kg (175 lb)   LMP  (LMP Unknown)   BMI 27 82 kg/m²      Physical Exam:  Physical Exam  Exam conducted with a chaperone present  Constitutional:       General: She is not in acute distress  Appearance: Normal appearance  She is well-developed  She is not ill-appearing, toxic-appearing or diaphoretic  HENT:      Head: Normocephalic and atraumatic  Eyes:      General: No scleral icterus  Right eye: No discharge  Left eye: No discharge  Conjunctiva/sclera: Conjunctivae normal    Cardiovascular:      Rate and Rhythm: Normal rate  Pulmonary:      Effort: Pulmonary effort is normal  No accessory muscle usage or respiratory distress  Chest:      Breasts:         Right: No inverted nipple, mass, nipple discharge, skin change or tenderness  Left: No inverted nipple, mass, nipple discharge, skin change or tenderness  Abdominal:      General: There is no distension  Palpations: Abdomen is soft  There is no mass  Tenderness: There is no abdominal tenderness  There is no guarding or rebound  Genitourinary:     General: Normal vulva  Exam position: Lithotomy position  Labia:         Right: No rash, tenderness or lesion  Left: No rash, tenderness or lesion  Vagina: No signs of injury   No vaginal discharge, erythema or tenderness  Cervix: No cervical motion tenderness, discharge, friability or erythema  Uterus: Not enlarged, not fixed and not tender  Adnexa:         Right: No mass, tenderness or fullness  Left: No mass, tenderness or fullness  Rectum: No external hemorrhoid  Normal anal tone  Comments: Urethral meatus: normal  Skin:     General: Skin is warm and dry  Coloration: Skin is not jaundiced  Findings: No bruising, erythema or rash  Neurological:      Mental Status: She is alert  Psychiatric:         Mood and Affect: Mood normal          Behavior: Behavior normal          Thought Content:  Thought content normal          Judgment: Judgment normal

## 2021-06-02 LAB
HPV HR 12 DNA CVX QL NAA+PROBE: NEGATIVE
HPV16 DNA CVX QL NAA+PROBE: NEGATIVE
HPV18 DNA CVX QL NAA+PROBE: NEGATIVE

## 2021-06-03 ENCOUNTER — HOSPITAL ENCOUNTER (OUTPATIENT)
Dept: MAMMOGRAPHY | Facility: MEDICAL CENTER | Age: 61
Discharge: HOME | End: 2021-06-03
Payer: COMMERCIAL

## 2021-06-03 VITALS — HEIGHT: 66 IN | BODY MASS INDEX: 28.12 KG/M2 | WEIGHT: 175 LBS

## 2021-06-03 DIAGNOSIS — Z12.31 ENCOUNTER FOR SCREENING MAMMOGRAM FOR MALIGNANT NEOPLASM OF BREAST: ICD-10-CM

## 2021-06-03 PROCEDURE — 77067 SCR MAMMO BI INCL CAD: CPT

## 2021-06-03 PROCEDURE — 77063 BREAST TOMOSYNTHESIS BI: CPT

## 2021-06-06 LAB
LAB AP GYN PRIMARY INTERPRETATION: NORMAL
Lab: NORMAL

## 2021-06-10 ENCOUNTER — HOSPITAL ENCOUNTER (OUTPATIENT)
Dept: ULTRASOUND IMAGING | Facility: CLINIC | Age: 61
Discharge: HOME | End: 2021-06-10
Payer: COMMERCIAL

## 2021-06-10 VITALS — WEIGHT: 175 LBS | BODY MASS INDEX: 28.12 KG/M2 | HEIGHT: 66 IN

## 2021-06-10 DIAGNOSIS — R92.8 ABNORMAL SCREENING MAMMOGRAM: ICD-10-CM

## 2021-06-10 PROCEDURE — 76642 ULTRASOUND BREAST LIMITED: CPT

## 2021-06-11 DIAGNOSIS — R92.8 ABNORMAL MAMMOGRAPHY: Primary | ICD-10-CM

## 2021-08-11 ENCOUNTER — OFFICE VISIT (OUTPATIENT)
Dept: URGENT CARE | Facility: CLINIC | Age: 61
End: 2021-08-11
Payer: COMMERCIAL

## 2021-08-11 VITALS
TEMPERATURE: 96.3 F | OXYGEN SATURATION: 98 % | RESPIRATION RATE: 18 BRPM | HEART RATE: 78 BPM | SYSTOLIC BLOOD PRESSURE: 118 MMHG | DIASTOLIC BLOOD PRESSURE: 72 MMHG
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
## 2021-08-11 DIAGNOSIS — S80.862A INSECT BITE OF LOWER LEG WITH LOCAL REACTION, LEFT, INITIAL ENCOUNTER: Primary | ICD-10-CM

## 2021-08-11 PROCEDURE — 99213 OFFICE O/P EST LOW 20 MIN: CPT | Performed by: PHYSICIAN ASSISTANT

## 2021-08-11 RX ORDER — TRIAMCINOLONE ACETONIDE 1 MG/G
CREAM TOPICAL
Qty: 30 G | Refills: 0 | Status: SHIPPED | OUTPATIENT
Start: 2021-08-11 | End: 2022-01-26 | Stop reason: ALTCHOICE

## 2021-08-11 NOTE — PROGRESS NOTES
[facility]    NAME: Cari Malagon is a 62 y o  female  : 1960    MRN: [id number]  DATE: 2021  TIME: 4:39 PM    Assessment and Plan   Insect bite of lower leg with local reaction, left, initial encounter [L74 [id number], W57  [id number]]  1  Insect bite of lower leg with local reaction, left, initial encounter  triamcinolone (KENALOG) 0 1 % cream       Patient Instructions     Patient Instructions   Cold compresses 5 minutes every 1-2 hours as needed to help control itch, swelling, redness  Topical cortisone cream, prescription strength  Continue your daily Zyrtec  Use your EpiPen if any difficulty breathing or swelling of face or throat and seek immediate further emergency attention by calling 911 or proceeding to emergency room  Chief Complaint     Chief Complaint   Patient presents with    Skin Problem     Patient with an area of redness to the left posterior knee  Unsure what bit her       History of Present Illness   Cari Malaogn presents to the clinic c/o  58-year-old female with sudden swollen red hot itchy area on the back of her left knee  Thought she got bit by a bug a couple days ago and it was just a small itchy area that would go away on its own  Today it seemed to suddenly get larger  She came here right away for further evaluation  She has not applied any ice or taken any benadryl  History of anaphylaxis with sulfa medication  Is taking daily antihistamine, Zyrtec  Denies any swelling of face or throat  No chest pain or shortness of breath  No abdominal pain  Area is very itchy  Review of Systems   Review of Systems   Constitutional: Negative  Skin: Positive for color change         Current Medications     Long-Term Medications   Medication Sig Dispense Refill    budesonide (PULMICORT FLEXHALER) 90 MCG/ACT inhaler Inhale 1 puff daily      Cetirizine HCl (ZYRTEC ALLERGY) 10 MG CAPS Take 1 capsule by mouth daily      EPINEPHrine (EPIPEN) 0 3 mg/0 3 mL SOAJ USE IN OUTTER THIGH IN CASE OF AN ALLERGIC EMERGENCY   levothyroxine 50 mcg tablet TAKE 1 TABLET BY MOUTH EVERY DAY 90 tablet 3    mometasone (NASONEX) 50 mcg/act nasal spray SHAKE LQ AND U 1 SPR IEN IN THE MORNING  11    triamcinolone (KENALOG) 0 1 % cream Apply and rub into  affected skin 3-4 times per day  Do not use on face or neck   30 g 0    [DISCONTINUED] cholecalciferol (VITAMIN D3) 1,000 units tablet Take 1 tablet by mouth daily         Current Allergies     Allergies as of 08/11/2021 - Reviewed 08/11/2021   Allergen Reaction Noted    Sulfa antibiotics Anaphylaxis 07/29/2013    Cefazolin  09/23/2013    Penicillins  07/29/2013          The following portions of the patient's history were reviewed and updated as appropriate: allergies, current medications, past family history, past medical history, past social history, past surgical history and problem list   Past Medical History:   Diagnosis Date    BRCA1 negative     BRCA2 negative     History of acute sinusitis     History of dysphagia     History of headache     new onset    History of persistent cough     Pain of both hip joints      Past Surgical History:   Procedure Laterality Date    BREAST CYST ASPIRATION  2006    DILATION AND CURETTAGE OF UTERUS      EXPLORATORY LAPAROTOMY      HIP SURGERY      NASAL SEPTUM SURGERY      Nasal Septal Deviation Repair    NOSE SURGERY      Closed Treat Nasal Bone Fx w/ Manipulation Stabilization    REPAIR EXTENSOR TENDON HAND       Family History   Problem Relation Age of Onset    Dementia Mother     Aneurysm Father         of the Left Middle Cerebral Artery    Coronary artery disease Father     Hypertension Father     Prostate cancer Father 76    Asthma Sister     Breast cancer Sister 77    Hypertension Brother     Asthma Son 23    Bipolar disorder Son         NOS    Depression Child         Emotional Depression    Ovarian cancer Maternal Grandmother 62  Lung cancer Maternal Grandfather 58    Breast cancer Maternal Aunt [de-identified]    Breast cancer Other 50    Pancreatic cancer Maternal Uncle 37    No Known Problems Maternal Aunt     No Known Problems Paternal Aunt        Objective   /72   Pulse 78   Temp (!) 96 3 °F (35 7 °C) (Tympanic)   Resp 18   LMP  (LMP Unknown)   SpO2 98%   No LMP recorded (lmp unknown)  Patient is postmenopausal        Physical Exam     Physical Exam  Vitals and nursing note reviewed  Constitutional:       General: She is not in acute distress  Appearance: Normal appearance  She is well-developed  She is not ill-appearing, toxic-appearing or diaphoretic  Cardiovascular:      Rate and Rhythm: Normal rate  Pulmonary:      Effort: Pulmonary effort is normal  No respiratory distress  Skin:     Findings: Erythema present  Comments: Red large localized plaque 7 x 9 cm with warmth, mild induration but nontender to palpation right knee popliteal and extending to upper calf  Appear to be insect bite wound popliteal area  No visible stinger  Neurological:      Mental Status: She is alert and oriented to person, place, and time     Psychiatric:         Mood and Affect: Mood normal          Behavior: Behavior normal

## 2021-08-11 NOTE — PATIENT INSTRUCTIONS
Cold compresses 5 minutes every 1-2 hours as needed to help control itch, swelling, redness  Topical cortisone cream, prescription strength  Continue your daily Zyrtec  Use your EpiPen if any difficulty breathing or swelling of face or throat and seek immediate further emergency attention by calling 911 or proceeding to emergency room 
No

## 2021-09-22 ENCOUNTER — CONSULT (OUTPATIENT)
Dept: FAMILY MEDICINE CLINIC | Facility: CLINIC | Age: 61
End: 2021-09-22
Payer: COMMERCIAL

## 2021-09-22 VITALS
BODY MASS INDEX: 28.45 KG/M2 | OXYGEN SATURATION: 98 % | HEIGHT: 66 IN | DIASTOLIC BLOOD PRESSURE: 80 MMHG | TEMPERATURE: 98.7 F | HEART RATE: 72 BPM | SYSTOLIC BLOOD PRESSURE: 110 MMHG | RESPIRATION RATE: 20 BRPM | WEIGHT: 177 LBS

## 2021-09-22 DIAGNOSIS — K21.9 GERD WITHOUT ESOPHAGITIS: ICD-10-CM

## 2021-09-22 DIAGNOSIS — J45.31 MILD PERSISTENT ASTHMA WITH ACUTE EXACERBATION: ICD-10-CM

## 2021-09-22 DIAGNOSIS — E03.9 HYPOTHYROIDISM, UNSPECIFIED TYPE: ICD-10-CM

## 2021-09-22 DIAGNOSIS — M16.12 OSTEOARTHRITIS OF LEFT HIP, UNSPECIFIED OSTEOARTHRITIS TYPE: ICD-10-CM

## 2021-09-22 DIAGNOSIS — Z01.818 PREOPERATIVE CLEARANCE: Primary | ICD-10-CM

## 2021-09-22 PROCEDURE — 3008F BODY MASS INDEX DOCD: CPT | Performed by: FAMILY MEDICINE

## 2021-09-22 PROCEDURE — 99243 OFF/OP CNSLTJ NEW/EST LOW 30: CPT | Performed by: FAMILY MEDICINE

## 2021-09-22 RX ORDER — CHLORAL HYDRATE 500 MG
CAPSULE ORAL
COMMUNITY

## 2021-09-22 NOTE — ASSESSMENT & PLAN NOTE
Asthma has been very well controlled on Pulmicort inhaler for maintenance  She has not needed albuterol for few months

## 2021-09-22 NOTE — PROGRESS NOTES
Assessment/Plan:      She is cleared for left total hip replacement scheduled for October 7th pending normal EKG done 9/13  I was able to review her preop lab work but not see her EKG  Will obtain a copy of the EKG done at [facility] for her chart  She will be discontinuing her visual supplement and avoiding all anti-inflammatories starting 7 days before her surgery  Asthma   Asthma has been very well controlled on Pulmicort inhaler for maintenance  She has not needed albuterol for few months  Hypothyroidism    Thyroid has been very stable on levothyroxine 50 mcg daily  She will be due for thyroid function tests in few months  GERD without esophagitis    Reflux has resolved since she is no longer working  She no longer takes omeprazole  Diagnoses and all orders for this visit:    Preoperative clearance    Osteoarthritis of left hip, unspecified osteoarthritis type    Mild persistent asthma with acute exacerbation    Hypothyroidism, unspecified type    GERD without esophagitis    Other orders  -     mupirocin (BACTROBAN) 2 % ointment; APPLY TOPICALLY 2 (TWO) TIMES A DAY  BEGIN 5 DAYS PRIOR TO SURGERY, APPLY TO NOSTRILS  -     Omega-3 Fatty Acids (fish oil) 1,000 mg; Use          Subjective:      Patient ID: Amy Tse is a 62 y o  female  She is here for preop visit prior to left hip replacement which is scheduled for October 7th by Dr Ghazal Awad  She had the right side done about 5 years ago and recently pain has been increasing on the left  She has also had a feeling of the left side giving out while walking  [address] [place] [address] laparoscopy 2013, tendon surgery left hand in 1989, deviated septum 1984, D and C 1981 in addition to right hip replacement by Dr Shira Nur 5/3/2016  She had paralytic ileus following abd surgery but otherwise denies anesthesia SEs    She denies cardiac history other than palpitations after caffeine      Asthma has been very well controlled on Pulmicort inhaler with rare use of albuterol          The following portions of the patient's history were reviewed and updated as appropriate: allergies, current medications, past family history, past medical history, past social history, past surgical history and problem list     Review of Systems   Constitutional: Negative for activity change, chills, fatigue and fever  HENT: Negative for congestion, ear pain, sinus pressure and sore throat  Eyes: Negative for pain and visual disturbance  Respiratory: Negative for cough, chest tightness, shortness of breath and wheezing  Cardiovascular: Negative for chest pain, palpitations and leg swelling  Gastrointestinal: Negative for abdominal pain, blood in stool, constipation, diarrhea, nausea and vomiting  Endocrine: Negative for polydipsia and polyuria  Genitourinary: Negative for difficulty urinating, dysuria, frequency and urgency  Musculoskeletal: Positive for arthralgias  Negative for joint swelling and myalgias  Skin: Negative for rash  Neurological: Negative for dizziness, weakness, numbness and headaches  Hematological: Negative for adenopathy  Does not bruise/bleed easily  Psychiatric/Behavioral: Negative for dysphoric mood  The patient is not nervous/anxious  Objective:      /80 (BP Location: Left arm, Patient Position: Sitting, Cuff Size: Adult)   Pulse 72   Temp 98 7 °F (37 1 °C) (Tympanic)   Resp 20   Ht 5' 6" (1 676 m)   Wt 80 3 kg (177 lb)   LMP  (LMP Unknown)   SpO2 98%   BMI 28 57 kg/m²          Physical Exam  Vitals and nursing note reviewed  Constitutional:       General: She is not in acute distress  Appearance: Normal appearance  She is well-developed  HENT:      Head: Normocephalic and atraumatic  Right Ear: External ear normal       Left Ear: External ear normal       Nose: Nose normal       Mouth/Throat:      Mouth: Mucous membranes are moist    Eyes:      General: No scleral icterus       Conjunctiva/sclera: Conjunctivae normal       Pupils: Pupils are equal, round, and reactive to light  Neck:      Thyroid: No thyromegaly  Cardiovascular:      Rate and Rhythm: Normal rate and regular rhythm  Pulses: Normal pulses  Heart sounds: Normal heart sounds  No murmur heard  Pulmonary:      Effort: Pulmonary effort is normal       Breath sounds: Normal breath sounds  No wheezing or rales  Abdominal:      General: Bowel sounds are normal       Palpations: Abdomen is soft  There is no mass  Tenderness: There is no abdominal tenderness  Musculoskeletal:         General: No tenderness or deformity  Cervical back: Normal range of motion and neck supple  Lymphadenopathy:      Cervical: No cervical adenopathy  Skin:     General: Skin is warm and dry  Findings: No erythema or rash  Neurological:      Mental Status: She is alert and oriented to person, place, and time  Cranial Nerves: No cranial nerve deficit  Deep Tendon Reflexes: Reflexes are normal and symmetric     Psychiatric:         Behavior: Behavior normal

## 2021-09-22 NOTE — ASSESSMENT & PLAN NOTE
Thyroid has been very stable on levothyroxine 50 mcg daily  She will be due for thyroid function tests in few months

## 2021-10-15 ENCOUNTER — OFFICE VISIT (OUTPATIENT)
Dept: FAMILY MEDICINE CLINIC | Facility: CLINIC | Age: 61
End: 2021-10-15
Payer: COMMERCIAL

## 2021-10-15 VITALS
RESPIRATION RATE: 16 BRPM | WEIGHT: 178 LBS | BODY MASS INDEX: 28.61 KG/M2 | HEIGHT: 66 IN | OXYGEN SATURATION: 98 % | DIASTOLIC BLOOD PRESSURE: 78 MMHG | SYSTOLIC BLOOD PRESSURE: 120 MMHG | TEMPERATURE: 96.7 F | HEART RATE: 85 BPM

## 2021-10-15 DIAGNOSIS — L08.9 INFECTED LESION OF SKIN: ICD-10-CM

## 2021-10-15 DIAGNOSIS — R21 RASH: Primary | ICD-10-CM

## 2021-10-15 PROCEDURE — 3725F SCREEN DEPRESSION PERFORMED: CPT | Performed by: INTERNAL MEDICINE

## 2021-10-15 PROCEDURE — 3008F BODY MASS INDEX DOCD: CPT | Performed by: INTERNAL MEDICINE

## 2021-10-15 PROCEDURE — 99212 OFFICE O/P EST SF 10 MIN: CPT | Performed by: INTERNAL MEDICINE

## 2021-10-15 PROCEDURE — 1036F TOBACCO NON-USER: CPT | Performed by: INTERNAL MEDICINE

## 2021-10-15 RX ORDER — NYSTATIN 100000 [USP'U]/G
POWDER TOPICAL 2 TIMES DAILY
Qty: 30 G | Refills: 0 | Status: SHIPPED | OUTPATIENT
Start: 2021-10-15

## 2021-10-15 RX ORDER — ASPIRIN 81 MG/1
81 TABLET ORAL 2 TIMES DAILY
COMMUNITY
Start: 2021-10-07 | End: 2022-01-26 | Stop reason: ALTCHOICE

## 2021-10-15 RX ORDER — METHOCARBAMOL 500 MG/1
TABLET, FILM COATED ORAL
COMMUNITY
Start: 2021-10-08 | End: 2022-01-26 | Stop reason: ALTCHOICE

## 2021-10-15 RX ORDER — OXYCODONE HYDROCHLORIDE 5 MG/1
TABLET ORAL
COMMUNITY
Start: 2021-10-13 | End: 2021-10-23

## 2021-10-15 RX ORDER — ACETAMINOPHEN 500 MG
1000 TABLET ORAL EVERY 8 HOURS
COMMUNITY
Start: 2021-10-07 | End: 2021-10-17

## 2021-12-13 ENCOUNTER — HOSPITAL ENCOUNTER (OUTPATIENT)
Dept: ULTRASOUND IMAGING | Facility: CLINIC | Age: 61
Discharge: HOME | End: 2021-12-13
Payer: COMMERCIAL

## 2021-12-13 VITALS — WEIGHT: 178 LBS | HEIGHT: 66 IN | BODY MASS INDEX: 28.61 KG/M2

## 2021-12-13 DIAGNOSIS — R92.2 DENSE BREASTS: ICD-10-CM

## 2021-12-13 DIAGNOSIS — R92.8 ABNORMAL MAMMOGRAPHY: ICD-10-CM

## 2021-12-13 PROCEDURE — 76641 ULTRASOUND BREAST COMPLETE: CPT

## 2022-01-26 ENCOUNTER — OFFICE VISIT (OUTPATIENT)
Dept: FAMILY MEDICINE CLINIC | Facility: CLINIC | Age: 62
End: 2022-01-26
Payer: COMMERCIAL

## 2022-01-26 VITALS
BODY MASS INDEX: 28.8 KG/M2 | HEART RATE: 80 BPM | DIASTOLIC BLOOD PRESSURE: 80 MMHG | SYSTOLIC BLOOD PRESSURE: 122 MMHG | OXYGEN SATURATION: 97 % | WEIGHT: 179.2 LBS | HEIGHT: 66 IN | TEMPERATURE: 98.8 F | RESPIRATION RATE: 16 BRPM

## 2022-01-26 DIAGNOSIS — J45.31 MILD PERSISTENT ASTHMA WITH ACUTE EXACERBATION: ICD-10-CM

## 2022-01-26 DIAGNOSIS — R53.82 CHRONIC FATIGUE: ICD-10-CM

## 2022-01-26 DIAGNOSIS — E55.9 VITAMIN D DEFICIENCY: ICD-10-CM

## 2022-01-26 DIAGNOSIS — Z00.00 ANNUAL PHYSICAL EXAM: ICD-10-CM

## 2022-01-26 DIAGNOSIS — E03.9 HYPOTHYROIDISM, UNSPECIFIED TYPE: ICD-10-CM

## 2022-01-26 DIAGNOSIS — E78.01 FAMILIAL HYPERCHOLESTEROLEMIA: Primary | ICD-10-CM

## 2022-01-26 PROCEDURE — 99396 PREV VISIT EST AGE 40-64: CPT | Performed by: FAMILY MEDICINE

## 2022-01-26 PROCEDURE — 3008F BODY MASS INDEX DOCD: CPT | Performed by: FAMILY MEDICINE

## 2022-01-26 PROCEDURE — 1036F TOBACCO NON-USER: CPT | Performed by: FAMILY MEDICINE

## 2022-01-26 RX ORDER — CLINDAMYCIN HYDROCHLORIDE 150 MG/1
CAPSULE ORAL
COMMUNITY
Start: 2021-10-27

## 2022-01-26 RX ORDER — LEVOTHYROXINE SODIUM 0.05 MG/1
50 TABLET ORAL DAILY
Qty: 90 TABLET | Refills: 3 | Status: SHIPPED | OUTPATIENT
Start: 2022-01-26

## 2022-01-26 NOTE — PROGRESS NOTES
[facility]    NAME: Rc Murphy  AGE: 62 y o  SEX: female  : 1960     DATE: 2022     Assessment and Plan:     Problem List Items Addressed This Visit        Endocrine    Hypothyroidism          Immunizations and preventive care screenings were discussed with patient today  Appropriate education was printed on patient's after visit summary  Counseling:  · Exercise: the importance of regular exercise/physical activity was discussed  Recommend exercise 3-5 times per week for at least 30 minutes  BMI Counseling: Body mass index is 28 92 kg/m²  The BMI is above normal  Nutrition recommendations include decreasing portion sizes, encouraging healthy choices of fruits and vegetables and moderation in carbohydrate intake  Exercise recommendations include moderate physical activity 150 minutes/week  No pharmacotherapy was ordered  Rationale for BMI follow-up plan is due to patient being overweight or obese  No follow-ups on file  Chief Complaint:     Chief Complaint   Patient presents with    Physical Exam      History of Present Illness:     Adult Annual Physical   Patient here for a comprehensive physical exam  The patient reports problems - continuing PT for left hip following hip replacement 4 mos ago  Diet and Physical Activity  · Diet/Nutrition: poor diet and limited fruits/vegetables  · Exercise: PT twice per week  Depression Screening  PHQ-2/9 Depression Screening         General Health  · Sleep: sleeps well and gets 4-6 hours of sleep on average  · Hearing: normal - bilateral   · Vision: most recent eye exam <1 year ago, wears contacts and She sees eye doctor q 6 mos for visual field test for mac degen  · Dental: regular dental visits, brushes teeth twice daily and flosses teeth occasionally         /GYN Health  · Patient is: postmenopausal  · Last menstrual period: age 56  · Contraceptive method: none  Review of Systems:     Review of Systems   Constitutional: Negative for activity change, chills, fatigue and fever  HENT: Negative for congestion, ear pain, sinus pressure and sore throat  Eyes: Negative for pain and visual disturbance  Respiratory: Negative for cough, chest tightness, shortness of breath and wheezing  Cardiovascular: Negative for chest pain, palpitations and leg swelling  Gastrointestinal: Negative for abdominal pain, blood in stool, constipation, diarrhea, nausea and vomiting  Endocrine: Negative for polydipsia and polyuria  Genitourinary: Negative for difficulty urinating, dysuria, frequency and urgency  Musculoskeletal: Negative for arthralgias, joint swelling and myalgias  Skin: Negative for rash  Neurological: Positive for weakness  Negative for dizziness, numbness and headaches  Hematological: Negative for adenopathy  Does not bruise/bleed easily  Psychiatric/Behavioral: Negative for dysphoric mood  The patient is not nervous/anxious         Past Medical History:     Past Medical History:   Diagnosis Date    BRCA1 negative     BRCA2 negative     History of acute sinusitis     History of dysphagia     History of headache     new onset    History of persistent cough     Pain of both hip joints       Past Surgical History:     Past Surgical History:   Procedure Laterality Date    BREAST CYST ASPIRATION  2006    DILATION AND CURETTAGE OF UTERUS      EXPLORATORY LAPAROTOMY      HIP SURGERY  05/03/2016    Right side     HIP SURGERY  10/07/2021    Left side     NASAL SEPTUM SURGERY      Nasal Septal Deviation Repair    NOSE SURGERY      Closed Treat Nasal Bone Fx w/ Manipulation Stabilization    REPAIR EXTENSOR TENDON HAND        Social History:     Social History     Socioeconomic History    Marital status: /Civil Union     Spouse name: None    Number of children: None    Years of education: None    Highest education level: None   Occupational History    None   Tobacco Use    Smoking status: Former Smoker     Quit date: 1978     Years since quittin 4    Smokeless tobacco: Never Used   Vaping Use    Vaping Use: Never used   Substance and Sexual Activity    Alcohol use:  Yes     Alcohol/week: 3 0 standard drinks     Types: 3 Standard drinks or equivalent per week     Comment: socially drinks 1 time per week    Drug use: Never    Sexual activity: Yes     Partners: Male   Other Topics Concern    None   Social History Narrative    Caffeine use: 1 20 oz of coffee     Social Determinants of Health     Financial Resource Strain: Not on file   Food Insecurity: Not on file   Transportation Needs: Not on file   Physical Activity: Not on file   Stress: Not on file   Social Connections: Not on file   Intimate Partner Violence: Not on file   Housing Stability: Not on file      Family History:     Family History   Problem Relation Age of Onset    Dementia Mother     Aneurysm Father         of the Left Middle Cerebral Artery    Coronary artery disease Father     Hypertension Father     Prostate cancer Father 76    Asthma Sister     Breast cancer Sister 77    Hypertension Brother     Asthma Son 23    Bipolar disorder Son         NOS    Depression Child         Emotional Depression    Ovarian cancer Maternal Grandmother 62    Lung cancer Maternal Grandfather 58    Breast cancer Maternal Aunt [de-identified]    Breast cancer Other 50    Pancreatic cancer Maternal Uncle 37    No Known Problems Maternal Aunt     No Known Problems Paternal Aunt       Current Medications:     Current Outpatient Medications   Medication Sig Dispense Refill    Omega-3 Fatty Acids (fish oil) 1,000 mg Use      albuterol (PROVENTIL HFA,VENTOLIN HFA) 90 mcg/act inhaler Inhale 2 puffs every 4 (four) hours as needed      budesonide (PULMICORT FLEXHALER) 90 MCG/ACT inhaler Inhale 1 puff daily      Cetirizine HCl (ZYRTEC ALLERGY) 10 MG CAPS Take 1 capsule by mouth daily      Cholecalciferol (VITAMIN D PO) Take 5,000 Units by mouth daily      clindamycin (CLEOCIN) 150 mg capsule       EPINEPHrine (EPIPEN) 0 3 mg/0 3 mL SOAJ USE IN OUTTER THIGH IN CASE OF AN ALLERGIC EMERGENCY   levothyroxine 50 mcg tablet TAKE 1 TABLET BY MOUTH EVERY DAY 90 tablet 3    Lutein 10 MG TABS Take 5 mg by mouth daily      mometasone (NASONEX) 50 mcg/act nasal spray SHAKE LQ AND U 1 SPR IEN IN THE MORNING  11    nystatin (MYCOSTATIN) powder Apply topically 2 (two) times a day (Patient not taking: Reported on 1/26/2022 ) 30 g 0     No current facility-administered medications for this visit  Allergies: Allergies   Allergen Reactions    Sulfa Antibiotics Anaphylaxis     HIVES    Cefazolin      Ancef another name for cefazolin    Penicillins       Physical Exam:     /80 (BP Location: Left arm, Patient Position: Sitting, Cuff Size: Large)   Pulse 80   Temp 98 8 °F (37 1 °C) (Tympanic)   Resp 16   Ht 5' 6" (1 676 m)   Wt 81 3 kg (179 lb 3 2 oz)   LMP  (LMP Unknown)   SpO2 97%   BMI 28 92 kg/m²     Physical Exam  Vitals and nursing note reviewed  Constitutional:       Appearance: Normal appearance  She is well-developed  HENT:      Head: Normocephalic and atraumatic  Right Ear: Tympanic membrane, ear canal and external ear normal       Left Ear: Tympanic membrane, ear canal and external ear normal       Mouth/Throat:      Mouth: Mucous membranes are moist    Eyes:      Pupils: Pupils are equal, round, and reactive to light  Neck:      Thyroid: No thyromegaly  Cardiovascular:      Rate and Rhythm: Normal rate and regular rhythm  Heart sounds: Normal heart sounds  No murmur heard  Pulmonary:      Effort: Pulmonary effort is normal  No respiratory distress  Breath sounds: Normal breath sounds  Abdominal:      General: Bowel sounds are normal  There is no distension  Palpations: Abdomen is soft  There is no mass  Tenderness: There is no abdominal tenderness  Musculoskeletal:         General: Normal range of motion  Cervical back: Normal range of motion and neck supple  Right lower leg: No edema  Left lower leg: No edema  Lymphadenopathy:      Cervical: No cervical adenopathy  Skin:     General: Skin is warm and dry  Findings: No erythema or rash  Neurological:      General: No focal deficit present  Mental Status: She is alert and oriented to person, place, and time  Cranial Nerves: No cranial nerve deficit        Deep Tendon Reflexes: Reflexes normal    Psychiatric:         Mood and Affect: Mood normal          Behavior: Behavior normal           MD Georgiana Lyons [id number]

## 2022-01-26 NOTE — PATIENT INSTRUCTIONS

## 2022-05-13 DIAGNOSIS — E55.9 VITAMIN D DEFICIENCY: Primary | ICD-10-CM

## 2022-05-13 RX ORDER — ERGOCALCIFEROL 1.25 MG/1
50000 CAPSULE ORAL WEEKLY
Qty: 8 CAPSULE | Refills: 0 | Status: SHIPPED | OUTPATIENT
Start: 2022-05-13 | End: 2022-07-12

## 2022-06-06 ENCOUNTER — ANNUAL EXAM (OUTPATIENT)
Dept: OBGYN CLINIC | Facility: MEDICAL CENTER | Age: 62
End: 2022-06-06
Payer: COMMERCIAL

## 2022-06-06 VITALS
DIASTOLIC BLOOD PRESSURE: 82 MMHG | SYSTOLIC BLOOD PRESSURE: 124 MMHG | WEIGHT: 180 LBS | HEIGHT: 66 IN | BODY MASS INDEX: 28.93 KG/M2

## 2022-06-06 DIAGNOSIS — Z01.419 WELL WOMAN EXAM WITH ROUTINE GYNECOLOGICAL EXAM: Primary | ICD-10-CM

## 2022-06-06 DIAGNOSIS — Z12.11 COLON CANCER SCREENING: ICD-10-CM

## 2022-06-06 DIAGNOSIS — Z12.31 ENCOUNTER FOR SCREENING MAMMOGRAM FOR MALIGNANT NEOPLASM OF BREAST: ICD-10-CM

## 2022-06-06 PROCEDURE — S0612 ANNUAL GYNECOLOGICAL EXAMINA: HCPCS | Performed by: OBSTETRICS & GYNECOLOGY

## 2022-06-06 NOTE — PROGRESS NOTES
Assessment   62 y o  postmenopausal female presenting for annual exam      Plan   Diagnoses and all orders for this visit:    Well woman exam with routine gynecological exam  - Pap up to date  - Mammo ordered  - Referral given for colonoscopy  - Return in 1yr for yearly    Encounter for screening mammogram for malignant neoplasm of breast  -     Mammo screening bilateral w 3d & cad; Future    Colon cancer screening  -     Ambulatory Referral to Gastroenterology; Future      __________________________________________________________________      Subjective     62 y o  postmenopausal female who is sexually active presenting for annual exam  She is without complaint       GYN  Complaints: denies  Denies dyspareunia, genital discharge, genital ulcers, pelvic pain and vulvar/vaginal symptoms  Menopause occurred at age approx 55  She has had no bleeding since this time  Menopausal symptoms: hot flashes intermittently  Sexually active: Yes - single partner - male  Hx STI: denies   Hx Abnormal pap: denies  Last pap:  - NILM     OB   ( x1, SAB x1)  Pregnancy complications: denies       Complaints: denies  Denies urinary frequency, hematuria, urinary incontinence and dysuria     BREAST  Complaints: denies  Denies: breast lump, breast tenderness, changed mole, dryness, nipple discharge, pruritus, rash, skin color change and skin lesion(s)  Last mammogram: 2021 - birads 1-2  Personal hx: breast cyst  Family hx: denies fhx of uterine or colon cancers  Sister was 72, aunt 76 with breast ca  Niece as well (daughter of her sister with Ca) with breast ca (50)  Ovarian cancer in maternal grandmother (48yo)    Personal History: Hormone history includes birth control  Surgical history includes breast cyst aspiration  Medical history includes BRCA 1 negative and BRCA 2 negative  Patient does do regular self-exams     GENERAL  PMH reviewed/updated and is as below  Patient does follow with a PCP    Works as a office manager for [employer]  Working from home largely  Retired from [address] [place] [address]  Denies domestic violence    Exercise: treadmill, recent hip replacement  Diet: nothing specific     SCREENING  Cervical Ca: pap up to date  Breast Ca: mammo scheduled  Colon Ca: 2015 - colonoscopy - overdue; referral provided      Past Medical History:   Diagnosis Date    BRCA1 negative     BRCA2 negative     History of acute sinusitis     History of dysphagia     History of headache     new onset    History of persistent cough     Pain of both hip joints        Past Surgical History:   Procedure Laterality Date    BREAST CYST ASPIRATION  2006    DILATION AND CURETTAGE OF UTERUS      EXPLORATORY LAPAROTOMY      HIP SURGERY  05/03/2016    Right side     HIP SURGERY  10/07/2021    Left side     NASAL SEPTUM SURGERY      Nasal Septal Deviation Repair    NOSE SURGERY      Closed Treat Nasal Bone Fx w/ Manipulation Stabilization    REPAIR EXTENSOR TENDON HAND           Current Outpatient Medications:     albuterol (PROVENTIL HFA,VENTOLIN HFA) 90 mcg/act inhaler, Inhale 2 puffs every 4 (four) hours as needed, Disp: , Rfl:     budesonide (PULMICORT) 90 MCG/ACT inhaler, Inhale 1 puff daily, Disp: , Rfl:     Cetirizine HCl 10 MG CAPS, Take 1 capsule by mouth daily, Disp: , Rfl:     Cholecalciferol (VITAMIN D PO), Take 5,000 Units by mouth daily, Disp: , Rfl:     clindamycin (CLEOCIN) 150 mg capsule, , Disp: , Rfl:     EPINEPHrine (EPIPEN) 0 3 mg/0 3 mL SOAJ, USE IN OUTTER THIGH IN CASE OF AN ALLERGIC EMERGENCY , Disp: , Rfl:     ergocalciferol (VITAMIN D2) 50,000 units, Take 1 capsule (50,000 Units total) by mouth once a week, Disp: 8 capsule, Rfl: 0    levothyroxine 50 mcg tablet, Take 1 tablet (50 mcg total) by mouth daily, Disp: 90 tablet, Rfl: 3    Lutein 10 MG TABS, Take 5 mg by mouth daily, Disp: , Rfl:     mometasone (NASONEX) 50 mcg/act nasal spray, SHAKE LQ AND U 1 SPR IEN IN THE MORNING, Disp: , Rfl: 11    Omega-3 Fatty Acids (fish oil) 1,000 mg, Use, Disp: , Rfl:     nystatin (MYCOSTATIN) powder, Apply topically 2 (two) times a day (Patient not taking: No sig reported), Disp: 30 g, Rfl: 0    Allergies   Allergen Reactions    Sulfa Antibiotics Anaphylaxis     HIVES    Cefazolin      Ancef another name for cefazolin    Penicillins        Social History     Tobacco Use    Smoking status: Former Smoker     Quit date: 1978     Years since quittin 8    Smokeless tobacco: Never Used   Vaping Use    Vaping Use: Never used   Substance Use Topics    Alcohol use: Yes     Alcohol/week: 3 0 standard drinks     Types: 3 Standard drinks or equivalent per week     Comment: socially drinks 1 time per week    Drug use: Never           Objective  /82   Ht 5' 6" (1 676 m)   Wt 81 6 kg (180 lb)   LMP  (LMP Unknown)   BMI 29 05 kg/m²      Physical Exam:  Physical Exam  Exam conducted with a chaperone present  Constitutional:       General: She is not in acute distress  Appearance: Normal appearance  She is well-developed  She is not ill-appearing, toxic-appearing or diaphoretic  HENT:      Head: Normocephalic and atraumatic  Eyes:      General: No scleral icterus  Right eye: No discharge  Left eye: No discharge  Conjunctiva/sclera: Conjunctivae normal    Cardiovascular:      Rate and Rhythm: Normal rate  Pulmonary:      Effort: Pulmonary effort is normal  No accessory muscle usage or respiratory distress  Chest:   Breasts: Breasts are symmetrical       Right: No inverted nipple, mass, nipple discharge, skin change, tenderness or axillary adenopathy  Left: No inverted nipple, mass, nipple discharge, skin change, tenderness or axillary adenopathy  Abdominal:      General: There is no distension  Palpations: Abdomen is soft  There is no mass  Tenderness: There is no abdominal tenderness  There is no guarding or rebound  Genitourinary:     General: Normal vulva  Exam position: Lithotomy position  Labia:         Right: No rash, tenderness or lesion  Left: No rash, tenderness or lesion  Urethra: No prolapse, urethral swelling or urethral lesion  Vagina: No signs of injury  No vaginal discharge, erythema or tenderness  Cervix: No cervical motion tenderness or discharge  Uterus: Not enlarged, not fixed and not tender  Adnexa:         Right: No mass, tenderness or fullness  Left: No mass, tenderness or fullness  Rectum: No external hemorrhoid  Normal anal tone  Comments: Urethral meatus: normal  Lymphadenopathy:      Upper Body:      Right upper body: No axillary or pectoral adenopathy  Left upper body: No axillary or pectoral adenopathy  Skin:     General: Skin is warm and dry  Coloration: Skin is not jaundiced  Findings: No bruising, erythema or rash  Neurological:      Mental Status: She is alert  Psychiatric:         Mood and Affect: Mood normal          Behavior: Behavior normal          Thought Content:  Thought content normal          Judgment: Judgment normal

## 2022-07-18 ENCOUNTER — HOSPITAL ENCOUNTER (OUTPATIENT)
Dept: MAMMOGRAPHY | Facility: MEDICAL CENTER | Age: 62
Discharge: HOME | End: 2022-07-18
Payer: COMMERCIAL

## 2022-07-18 VITALS — HEIGHT: 66 IN | BODY MASS INDEX: 28.91 KG/M2 | WEIGHT: 179.9 LBS

## 2022-07-18 DIAGNOSIS — Z12.31 ENCOUNTER FOR SCREENING MAMMOGRAM FOR MALIGNANT NEOPLASM OF BREAST: ICD-10-CM

## 2022-07-18 PROCEDURE — 77067 SCR MAMMO BI INCL CAD: CPT

## 2022-07-18 PROCEDURE — 77063 BREAST TOMOSYNTHESIS BI: CPT

## 2022-09-16 ENCOUNTER — OFFICE VISIT (OUTPATIENT)
Dept: URGENT CARE | Facility: CLINIC | Age: 62
End: 2022-09-16
Payer: COMMERCIAL

## 2022-09-16 VITALS
OXYGEN SATURATION: 96 % | SYSTOLIC BLOOD PRESSURE: 108 MMHG | TEMPERATURE: 98.6 F | HEART RATE: 86 BPM | RESPIRATION RATE: 20 BRPM | DIASTOLIC BLOOD PRESSURE: 70 MMHG

## 2022-09-16 DIAGNOSIS — Z20.822 EXPOSURE TO COVID-19 VIRUS: ICD-10-CM

## 2022-09-16 DIAGNOSIS — J06.9 ACUTE URI: Primary | ICD-10-CM

## 2022-09-16 DIAGNOSIS — J40 BRONCHITIS: ICD-10-CM

## 2022-09-16 PROCEDURE — U0005 INFEC AGEN DETEC AMPLI PROBE: HCPCS | Performed by: PHYSICIAN ASSISTANT

## 2022-09-16 PROCEDURE — 99213 OFFICE O/P EST LOW 20 MIN: CPT | Performed by: PHYSICIAN ASSISTANT

## 2022-09-16 PROCEDURE — U0003 INFECTIOUS AGENT DETECTION BY NUCLEIC ACID (DNA OR RNA); SEVERE ACUTE RESPIRATORY SYNDROME CORONAVIRUS 2 (SARS-COV-2) (CORONAVIRUS DISEASE [COVID-19]), AMPLIFIED PROBE TECHNIQUE, MAKING USE OF HIGH THROUGHPUT TECHNOLOGIES AS DESCRIBED BY CMS-2020-01-R: HCPCS | Performed by: PHYSICIAN ASSISTANT

## 2022-09-16 RX ORDER — ALBUTEROL SULFATE 2.5 MG/3ML
SOLUTION RESPIRATORY (INHALATION)
Qty: 75 ML | Refills: 0 | Status: SHIPPED | OUTPATIENT
Start: 2022-09-16

## 2022-09-16 NOTE — LETTER
September 16, 2022     Patient: Constance Heart   YOB: 1960   Date of Visit: 9/16/2022       To Whom It May Concern:    Patient seen in office today for acute medical ailment  COVID testing initiated  Please excuse from work           Sincerely,        Carolyn Rivero PA-C    CC: No Recipients

## 2022-09-16 NOTE — PATIENT INSTRUCTIONS
Increased her Pulmicort twice a day  Continue to use your nebulizer as needed  Mucinex D 1/2-1 tablet one to twice a day with full glass of water to help decrease mucus production and keep mucus thin  COVID testing initiated  Results take approximately 24-48 hours to return  You may access these results through the [facility] chart account  If your COVID test is positive, please notify your primary care provider as soon as possible  If not improving over the next 5-7 days or significant worsening of wheezing chest discomfort seek further evaluation  If profound weakness, chest pain, shortness of breath proceed immediately to emergency room for further evaluation  Work note given

## 2022-09-16 NOTE — PROGRESS NOTES
[id number]    NAME: Favian Goetz is a 65 y o  female  : 1960    MRN: [id number]  DATE: 2022  TIME: 3:41 PM    Assessment and Plan   Acute URI [J06 9]  1  Acute URI  COVID Only -Office Collect    albuterol (2 5 mg/3 mL) 0 083 % nebulizer solution   2  Bronchitis  COVID Only -Office Collect    albuterol (2 5 mg/3 mL) 0 083 % nebulizer solution   3  Exposure to COVID-19 virus  COVID Only -Office Collect       Patient Instructions   Patient Instructions   Increased her Pulmicort twice a day  Continue to use your nebulizer as needed  Mucinex D 1/2-1 tablet one to twice a day with full glass of water to help decrease mucus production and keep mucus thin  COVID testing initiated  Results take approximately 24-48 hours to return  You may access these results through the [email or web address] chart account  If your COVID test is positive, please notify your primary care provider as soon as possible  If not improving over the next 5-7 days or significant worsening of wheezing chest discomfort seek further evaluation  If profound weakness, chest pain, shortness of breath proceed immediately to emergency room for further evaluation  Work note given  Chief Complaint     Chief Complaint   Patient presents with    Facial Pain     Pt C/O sinus congestion since , reported started to wheeze yesterday and using neb treatments  Pt reports pain  pressure behind the eye, teeth and forehead   is covid +(Monday)  Pt has been completing home covid tests with results negative  Last covid test was 2pm this afternoon with a negative result  History of Present Illness   Favian Goetz presents to the clinic c/o  81-year-old female comes in with sinus pain congestion cough and wheezing  Started with sinus congestion on  night   started with symptoms on Monday and tested positive for COVID    Patient has taken COVID test at home and they have been negative  Slight fever but no chills or diaphoresis  History of asthma  Uses Pulmicort once a day  Has started to use her albuterol nebulizer  Taking Advil  Not taking any decongestants  She is having pain in her teeth  She has had 2 COVID booster vaccines  Temperature this morning 99 9  Review of Systems   Review of Systems   Constitutional: Positive for activity change, appetite change, fatigue and fever  Negative for chills and diaphoresis  HENT: Positive for congestion, postnasal drip, rhinorrhea, sinus pressure and sinus pain  Negative for sore throat  Eyes: Negative  Respiratory: Positive for cough and wheezing  Negative for chest tightness and shortness of breath  Cardiovascular: Negative  Hematological: Negative  Current Medications     Long-Term Medications   Medication Sig Dispense Refill    budesonide (PULMICORT) 90 MCG/ACT inhaler Inhale 1 puff daily      Cetirizine HCl 10 MG CAPS Take 1 capsule by mouth daily      EPINEPHrine (EPIPEN) 0 3 mg/0 3 mL SOAJ USE IN OUTTER THIGH IN CASE OF AN ALLERGIC EMERGENCY        levothyroxine 50 mcg tablet Take 1 tablet (50 mcg total) by mouth daily 90 tablet 3    mometasone (NASONEX) 50 mcg/act nasal spray SHAKE LQ AND U 1 SPR IEN IN THE MORNING  11    nystatin (MYCOSTATIN) powder Apply topically 2 (two) times a day 30 g 0    Omega-3 Fatty Acids (fish oil) 1,000 mg Use      ergocalciferol (VITAMIN D2) 50,000 units Take 1 capsule (50,000 Units total) by mouth once a week 8 capsule 0    [DISCONTINUED] cholecalciferol (VITAMIN D3) 1,000 units tablet Take 1 tablet by mouth daily         Current Allergies     Allergies as of 09/16/2022 - Reviewed 09/16/2022   Allergen Reaction Noted    Sulfa antibiotics Anaphylaxis 07/29/2013    Cefazolin  09/23/2013    Penicillins  07/29/2013          The following portions of the patient's history were reviewed and updated as appropriate: allergies, current medications, past family history, past medical history, past social history, past surgical history and problem list   Past Medical History:   Diagnosis Date    BRCA1 negative     BRCA2 negative     History of acute sinusitis     History of dysphagia     History of headache     new onset    History of persistent cough     Pain of both hip joints      Past Surgical History:   Procedure Laterality Date    BREAST CYST ASPIRATION  2006    DILATION AND CURETTAGE OF UTERUS      EXPLORATORY LAPAROTOMY      HIP SURGERY  05/03/2016    Right side     HIP SURGERY  10/07/2021    Left side     NASAL SEPTUM SURGERY      Nasal Septal Deviation Repair    NOSE SURGERY      Closed Treat Nasal Bone Fx w/ Manipulation Stabilization    REPAIR EXTENSOR TENDON HAND       Family History   Problem Relation Age of Onset    Dementia Mother     Aneurysm Father         of the Left Middle Cerebral Artery    Coronary artery disease Father     Hypertension Father     Prostate cancer Father 76    Asthma Sister     Breast cancer Sister 77    Hypertension Brother     Asthma Son 23    Bipolar disorder Son         NOS    Depression Child         Emotional Depression    Ovarian cancer Maternal Grandmother 62    Lung cancer Maternal Grandfather 58    Breast cancer Maternal Aunt [de-identified]    Breast cancer Other 50    Pancreatic cancer Maternal Uncle 37    No Known Problems Maternal Aunt     No Known Problems Paternal Aunt        Objective   /70   Pulse 86   Temp 98 6 °F (37 °C)   Resp 20   LMP  (LMP Unknown)   SpO2 96%   No LMP recorded (lmp unknown)  Patient is postmenopausal        Physical Exam     Physical Exam  Vitals and nursing note reviewed  Constitutional:       General: She is not in acute distress  Appearance: She is not ill-appearing, toxic-appearing or diaphoretic  Comments: Appears mildly ill but no acute distress   HENT:      Head: Normocephalic and atraumatic  Nose: Congestion and rhinorrhea present  Mouth/Throat:      Mouth: Mucous membranes are moist       Pharynx: No oropharyngeal exudate or posterior oropharyngeal erythema  Eyes:      General:         Right eye: No discharge  Left eye: No discharge  Extraocular Movements: Extraocular movements intact  Conjunctiva/sclera: Conjunctivae normal       Pupils: Pupils are equal, round, and reactive to light  Cardiovascular:      Rate and Rhythm: Normal rate and regular rhythm  Heart sounds: Normal heart sounds  No murmur heard  No friction rub  No gallop  Pulmonary:      Effort: Pulmonary effort is normal  No respiratory distress  Breath sounds: Normal breath sounds  No stridor  No wheezing, rhonchi or rales  Musculoskeletal:      Cervical back: Normal range of motion and neck supple  No rigidity or tenderness  Lymphadenopathy:      Cervical: No cervical adenopathy  Skin:     General: Skin is warm and dry  Coloration: Skin is not pale  Neurological:      Mental Status: She is alert and oriented to person, place, and time     Psychiatric:         Mood and Affect: Mood normal          Behavior: Behavior normal

## 2022-09-17 LAB — SARS-COV-2 RNA RESP QL NAA+PROBE: POSITIVE

## 2022-12-27 ENCOUNTER — APPOINTMENT (OUTPATIENT)
Dept: LAB | Facility: MEDICAL CENTER | Age: 62
End: 2022-12-27

## 2022-12-27 ENCOUNTER — OFFICE VISIT (OUTPATIENT)
Dept: FAMILY MEDICINE CLINIC | Facility: CLINIC | Age: 62
End: 2022-12-27

## 2022-12-27 VITALS
SYSTOLIC BLOOD PRESSURE: 110 MMHG | WEIGHT: 184 LBS | HEART RATE: 78 BPM | DIASTOLIC BLOOD PRESSURE: 82 MMHG | HEIGHT: 66 IN | BODY MASS INDEX: 29.57 KG/M2 | OXYGEN SATURATION: 95 %

## 2022-12-27 DIAGNOSIS — R06.02 SOB (SHORTNESS OF BREATH): ICD-10-CM

## 2022-12-27 DIAGNOSIS — E03.9 HYPOTHYROIDISM, UNSPECIFIED TYPE: ICD-10-CM

## 2022-12-27 DIAGNOSIS — E78.2 MIXED HYPERLIPIDEMIA: ICD-10-CM

## 2022-12-27 DIAGNOSIS — E55.9 VITAMIN D DEFICIENCY: ICD-10-CM

## 2022-12-27 DIAGNOSIS — R25.2 MUSCLE CRAMPS: Primary | ICD-10-CM

## 2022-12-27 DIAGNOSIS — Z13.1 SCREENING FOR DIABETES MELLITUS: ICD-10-CM

## 2022-12-27 DIAGNOSIS — R25.2 MUSCLE CRAMPS: ICD-10-CM

## 2022-12-27 LAB
25(OH)D3 SERPL-MCNC: 24.8 NG/ML (ref 30–100)
ALBUMIN SERPL BCP-MCNC: 3.9 G/DL (ref 3.5–5)
ALP SERPL-CCNC: 90 U/L (ref 46–116)
ALT SERPL W P-5'-P-CCNC: 46 U/L (ref 12–78)
ANION GAP SERPL CALCULATED.3IONS-SCNC: 2 MMOL/L (ref 4–13)
AST SERPL W P-5'-P-CCNC: 26 U/L (ref 5–45)
BASOPHILS # BLD AUTO: 0.06 THOUSANDS/ÂΜL (ref 0–0.1)
BASOPHILS NFR BLD AUTO: 1 % (ref 0–1)
BILIRUB SERPL-MCNC: 0.89 MG/DL (ref 0.2–1)
BUN SERPL-MCNC: 12 MG/DL (ref 5–25)
CALCIUM SERPL-MCNC: 9.3 MG/DL (ref 8.3–10.1)
CHLORIDE SERPL-SCNC: 107 MMOL/L (ref 96–108)
CHOLEST SERPL-MCNC: 246 MG/DL
CO2 SERPL-SCNC: 30 MMOL/L (ref 21–32)
CREAT SERPL-MCNC: 0.79 MG/DL (ref 0.6–1.3)
EOSINOPHIL # BLD AUTO: 0.28 THOUSAND/ÂΜL (ref 0–0.61)
EOSINOPHIL NFR BLD AUTO: 5 % (ref 0–6)
ERYTHROCYTE [DISTWIDTH] IN BLOOD BY AUTOMATED COUNT: 13.4 % (ref 11.6–15.1)
EST. AVERAGE GLUCOSE BLD GHB EST-MCNC: 111 MG/DL
GFR SERPL CREATININE-BSD FRML MDRD: 81 ML/MIN/1.73SQ M
GLUCOSE SERPL-MCNC: 98 MG/DL (ref 65–140)
HBA1C MFR BLD: 5.5 %
HCT VFR BLD AUTO: 42.7 % (ref 34.8–46.1)
HDLC SERPL-MCNC: 68 MG/DL
HGB BLD-MCNC: 13.6 G/DL (ref 11.5–15.4)
IMM GRANULOCYTES # BLD AUTO: 0.02 THOUSAND/UL (ref 0–0.2)
IMM GRANULOCYTES NFR BLD AUTO: 0 % (ref 0–2)
LDLC SERPL CALC-MCNC: 163 MG/DL (ref 0–100)
LYMPHOCYTES # BLD AUTO: 1.53 THOUSANDS/ÂΜL (ref 0.6–4.47)
LYMPHOCYTES NFR BLD AUTO: 27 % (ref 14–44)
MAGNESIUM SERPL-MCNC: 2.5 MG/DL (ref 1.6–2.6)
MCH RBC QN AUTO: 29.4 PG (ref 26.8–34.3)
MCHC RBC AUTO-ENTMCNC: 31.9 G/DL (ref 31.4–37.4)
MCV RBC AUTO: 92 FL (ref 82–98)
MONOCYTES # BLD AUTO: 0.6 THOUSAND/ÂΜL (ref 0.17–1.22)
MONOCYTES NFR BLD AUTO: 11 % (ref 4–12)
NEUTROPHILS # BLD AUTO: 3.11 THOUSANDS/ÂΜL (ref 1.85–7.62)
NEUTS SEG NFR BLD AUTO: 56 % (ref 43–75)
NONHDLC SERPL-MCNC: 178 MG/DL
NRBC BLD AUTO-RTO: 0 /100 WBCS
NT-PROBNP SERPL-MCNC: 26 PG/ML
PLATELET # BLD AUTO: 296 THOUSANDS/UL (ref 149–390)
PMV BLD AUTO: 9.9 FL (ref 8.9–12.7)
POTASSIUM SERPL-SCNC: 4.5 MMOL/L (ref 3.5–5.3)
PROT SERPL-MCNC: 8 G/DL (ref 6.4–8.4)
RBC # BLD AUTO: 4.62 MILLION/UL (ref 3.81–5.12)
SODIUM SERPL-SCNC: 139 MMOL/L (ref 135–147)
TRIGL SERPL-MCNC: 73 MG/DL
TSH SERPL DL<=0.05 MIU/L-ACNC: 1.81 UIU/ML (ref 0.45–4.5)
WBC # BLD AUTO: 5.6 THOUSAND/UL (ref 4.31–10.16)

## 2022-12-27 RX ORDER — TIZANIDINE HYDROCHLORIDE 4 MG/1
4 CAPSULE, GELATIN COATED ORAL
Qty: 30 CAPSULE | Refills: 0 | Status: SHIPPED | OUTPATIENT
Start: 2022-12-27

## 2022-12-27 NOTE — PROGRESS NOTES
Assessment/Plan:    Muscle cramps  We will try to exclude secondary causes, will check electrolytes, TSH, vitamin D level, globin A1c  We will use tizanidine 4 mg daily at night for symptomatic management  Side effects discussed  Etiology might be also due to radiculopathy  She will consider physical therapy if her blood work results will be fine  SOB (shortness of breath)  Likely due to deconditioning and being overweight  We will recheck her BNP to exclude CHF  Diagnoses and all orders for this visit:    Muscle cramps  -     Comprehensive metabolic panel; Future  -     CBC and differential; Future  -     Magnesium; Future  -     TiZANidine (ZANAFLEX) 4 MG capsule; Take 1 capsule (4 mg total) by mouth daily at bedtime    Hypothyroidism, unspecified type  -     TSH, 3rd generation with Free T4 reflex; Future    Mixed hyperlipidemia  -     Lipid panel; Future    Vitamin D deficiency  -     Vitamin D 25 hydroxy; Future    Screening for diabetes mellitus  -     HEMOGLOBIN A1C W/ EAG ESTIMATION; Future    SOB (shortness of breath)  -     NT-BNP PRO; Future    Other orders  -     Cancel: influenza vaccine, quadrivalent, recombinant, PF, 0 5 mL, for patients 18 yr+ (FLUBLOK)          Subjective:      Patient ID: Taras Johnson is a 65 y o  female  Patient came today with 2 new complaints including few months of muscle cramps at her lower extremities and some shortness of breath that started that she has after recent COVID-19 infection  The following portions of the patient's history were reviewed and updated as appropriate: allergies, current medications, past family history, past medical history, past social history, past surgical history, and problem list     Review of Systems   Constitutional: Negative for activity change, appetite change, chills, fatigue and fever  Respiratory: Positive for shortness of breath  Negative for cough and wheezing      Cardiovascular: Negative for chest pain, palpitations and leg swelling  Gastrointestinal: Negative for abdominal distention and abdominal pain  Genitourinary: Negative for difficulty urinating, frequency and pelvic pain  Musculoskeletal: Positive for arthralgias and back pain  Negative for neck pain  Skin: Negative for rash  Neurological: Negative for dizziness, tremors, weakness, numbness and headaches  Objective:      /82 (BP Location: Left arm, Patient Position: Sitting, Cuff Size: Large)   Pulse 78   Ht 5' 6" (1 676 m)   Wt 83 5 kg (184 lb)   LMP  (LMP Unknown)   SpO2 95%   BMI 29 70 kg/m²     Allergies   Allergen Reactions   • Sulfa Antibiotics Anaphylaxis     HIVES   • Cefazolin      Ancef another name for cefazolin   • Penicillins           Current Outpatient Medications:   •  albuterol (2 5 mg/3 mL) 0 083 % nebulizer solution, 25 unit doses   1 unit dose in nebulizer q 4-6 hours as needed cough / wheezing , Disp: 75 mL, Rfl: 0  •  albuterol (PROVENTIL HFA,VENTOLIN HFA) 90 mcg/act inhaler, Inhale 2 puffs every 4 (four) hours as needed, Disp: , Rfl:   •  budesonide (PULMICORT) 90 MCG/ACT inhaler, Inhale 1 puff daily, Disp: , Rfl:   •  Cetirizine HCl 10 MG CAPS, Take 1 capsule by mouth daily, Disp: , Rfl:   •  Cholecalciferol (VITAMIN D PO), Take 5,000 Units by mouth daily, Disp: , Rfl:   •  clindamycin (CLEOCIN) 150 mg capsule, , Disp: , Rfl:   •  EPINEPHrine (EPIPEN) 0 3 mg/0 3 mL SOAJ, USE IN OUTTER THIGH IN CASE OF AN ALLERGIC EMERGENCY , Disp: , Rfl:   •  ergocalciferol (VITAMIN D2) 50,000 units, Take 1 capsule (50,000 Units total) by mouth once a week, Disp: 8 capsule, Rfl: 0  •  levothyroxine 50 mcg tablet, Take 1 tablet (50 mcg total) by mouth daily, Disp: 90 tablet, Rfl: 3  •  Lutein 10 MG TABS, Take 5 mg by mouth daily, Disp: , Rfl:   •  mometasone (NASONEX) 50 mcg/act nasal spray, SHAKE LQ AND U 1 SPR IEN IN THE MORNING, Disp: , Rfl: 11  •  Omega-3 Fatty Acids (fish oil) 1,000 mg, Use, Disp: , Rfl:   • TiZANidine (ZANAFLEX) 4 MG capsule, Take 1 capsule (4 mg total) by mouth daily at bedtime, Disp: 30 capsule, Rfl: 0  •  nystatin (MYCOSTATIN) powder, Apply topically 2 (two) times a day, Disp: 30 g, Rfl: 0     There are no Patient Instructions on file for this visit  Physical Exam  Constitutional:       General: She is not in acute distress  Appearance: Normal appearance  She is not ill-appearing  HENT:      Nose: No rhinorrhea  Cardiovascular:      Rate and Rhythm: Normal rate and regular rhythm  Heart sounds: No murmur heard  No friction rub  No gallop  Pulmonary:      Effort: No respiratory distress  Breath sounds: No wheezing or rhonchi  Chest:      Chest wall: No tenderness  Abdominal:      General: There is no distension  Palpations: There is no mass  Tenderness: There is no abdominal tenderness  There is no guarding or rebound  Hernia: No hernia is present  Musculoskeletal:         General: Tenderness (Lower back) present  No swelling  Lymphadenopathy:      Cervical: No cervical adenopathy  Skin:     Coloration: Skin is not jaundiced  Findings: No rash  Neurological:      Mental Status: She is alert and oriented to person, place, and time  Motor: No weakness        Gait: Gait normal    Psychiatric:         Mood and Affect: Mood normal          Behavior: Behavior normal

## 2022-12-27 NOTE — ASSESSMENT & PLAN NOTE
We will try to exclude secondary causes, will check electrolytes, TSH, vitamin D level, globin A1c  We will use tizanidine 4 mg daily at night for symptomatic management  Side effects discussed  Etiology might be also due to radiculopathy  She will consider physical therapy if her blood work results will be fine

## 2023-01-20 DIAGNOSIS — R25.2 MUSCLE CRAMPS: ICD-10-CM

## 2023-01-20 RX ORDER — TIZANIDINE HYDROCHLORIDE 4 MG/1
4 CAPSULE, GELATIN COATED ORAL
Qty: 90 CAPSULE | Refills: 1 | Status: SHIPPED | OUTPATIENT
Start: 2023-01-20

## 2023-02-03 ENCOUNTER — OFFICE VISIT (OUTPATIENT)
Dept: GASTROENTEROLOGY | Facility: MEDICAL CENTER | Age: 63
End: 2023-02-03

## 2023-02-03 VITALS
HEART RATE: 73 BPM | OXYGEN SATURATION: 97 % | DIASTOLIC BLOOD PRESSURE: 68 MMHG | SYSTOLIC BLOOD PRESSURE: 120 MMHG | WEIGHT: 179.9 LBS | BODY MASS INDEX: 28.91 KG/M2 | HEIGHT: 66 IN

## 2023-02-03 DIAGNOSIS — Z86.010 HX OF COLONIC POLYPS: Primary | ICD-10-CM

## 2023-02-03 DIAGNOSIS — Z12.11 COLON CANCER SCREENING: ICD-10-CM

## 2023-02-03 RX ORDER — OMEPRAZOLE 10 MG/1
10 CAPSULE, DELAYED RELEASE ORAL DAILY
COMMUNITY

## 2023-02-03 NOTE — PATIENT INSTRUCTIONS
Scheduled date of Colonoscopy (as of today) 04/20/2023  Physician performing: Dr Teresa Larry  Location of procedure:   [place]  Instructions given to patient:  Adele  Clearances: n/a

## 2023-02-03 NOTE — PROGRESS NOTES
[facility] - Outpatient Consultation  Yas Ortiz 65 y o  female MRN: [id number]  Encounter: [id number]          ASSESSMENT AND PLAN:      1  Colon cancer screening  2  Hx of colonic polyps  Patient is due for repeat colonoscopy in 2018 due hx of polyps  Will schedule colonoscopy for surveillance  - polyethylene glycol (GOLYTELY) 4000 mL solution; Take 4,000 mL by mouth once for 1 dose  Dispense: 4000 mL; Refill: 0  - bisacodyl (DULCOLAX) 5 mg EC tablet; Take 2 tablets (10 mg total) by mouth once for 1 dose  Dispense: 2 tablet; Refill: 0  - Colonoscopy; Future    ______________________________________________________________________    HPI:         23-year-old female referred for colon cancer screening  Patient's last colonoscopy was in April 2015 with findings of 3 small polyps removed  She was recommended to repeat colonoscopy in 2018 but she did not follow-up  She reported regular formed bowel movement  He denies blood in the stool  She denies diarrhea or constipation or abdominal pain  She denies family history of colon cancer  He denies smoking or alcohol abuse  REVIEW OF SYSTEMS:    CONSTITUTIONAL: Denies any fever, chills, rigors, and weight loss  HEENT: No earache or tinnitus  Denies hearing loss or visual disturbances  CARDIOVASCULAR: No chest pain or palpitations  RESPIRATORY: Denies any cough, hemoptysis, shortness of breath or dyspnea on exertion  GASTROINTESTINAL: As noted in the History of Present Illness  GENITOURINARY: No problems with urination  Denies any hematuria or dysuria  NEUROLOGIC: No dizziness or vertigo, denies headaches  MUSCULOSKELETAL: Denies any muscle or joint pain  SKIN: Denies skin rashes or itching  ENDOCRINE: Denies excessive thirst  Denies intolerance to heat or cold  PSYCHOSOCIAL: Denies depression or anxiety  Denies any recent memory loss         Historical Information   Past Medical History:   Diagnosis Date   • BRCA1 negative    • BRCA2 negative    • History of acute sinusitis    • History of dysphagia    • History of headache     new onset   • History of persistent cough    • Pain of both hip joints      Past Surgical History:   Procedure Laterality Date   • BREAST CYST ASPIRATION     • DILATION AND CURETTAGE OF UTERUS     • EXPLORATORY LAPAROTOMY     • HIP SURGERY  2016    Right side    • HIP SURGERY  10/07/2021    Left side    • NASAL SEPTUM SURGERY      Nasal Septal Deviation Repair   • NOSE SURGERY      Closed Treat Nasal Bone Fx w/ Manipulation Stabilization   • REPAIR EXTENSOR TENDON HAND       Social History   Social History     Substance and Sexual Activity   Alcohol Use Yes   • Alcohol/week: 3 0 standard drinks   • Types: 3 Standard drinks or equivalent per week    Comment: socially drinks 1 time per week     Social History     Substance and Sexual Activity   Drug Use Never     Social History     Tobacco Use   Smoking Status Former   • Types: Cigarettes   • Quit date: 1978   • Years since quittin 4   Smokeless Tobacco Never     Family History   Problem Relation Age of Onset   • Dementia Mother    • Aneurysm Father         of the Left Middle Cerebral Artery   • Coronary artery disease Father    • Hypertension Father    • Prostate cancer Father 76   • Asthma Sister    • Breast cancer Sister 77   • Hypertension Brother    • Asthma Son 23   • Bipolar disorder Son         NOS   • Depression Child         Emotional Depression   • Ovarian cancer Maternal Grandmother 62   • Lung cancer Maternal Grandfather 62   • Breast cancer Maternal Aunt [de-identified]   • Breast cancer Other 48   • Pancreatic cancer Maternal Uncle 37   • No Known Problems Maternal Aunt    • No Known Problems Paternal Aunt        Meds/Allergies       Current Outpatient Medications:   •  albuterol (2 5 mg/3 mL) 0 083 % nebulizer solution  •  albuterol (PROVENTIL HFA,VENTOLIN HFA) 90 mcg/act inhaler  •  bisacodyl (DULCOLAX) 5 mg EC tablet  • budesonide (PULMICORT) 90 MCG/ACT inhaler  •  Cetirizine HCl 10 MG CAPS  •  Cholecalciferol (VITAMIN D PO)  •  clindamycin (CLEOCIN) 150 mg capsule  •  EPINEPHrine (EPIPEN) 0 3 mg/0 3 mL SOAJ  •  levothyroxine 50 mcg tablet  •  Lutein 10 MG TABS  •  mometasone (NASONEX) 50 mcg/act nasal spray  •  Omega-3 Fatty Acids (fish oil) 1,000 mg  •  omeprazole (PriLOSEC) 10 mg delayed release capsule  •  polyethylene glycol (GOLYTELY) 4000 mL solution  •  TiZANidine (ZANAFLEX) 4 MG capsule  •  ergocalciferol (VITAMIN D2) 50,000 units  •  nystatin (MYCOSTATIN) powder    Allergies   Allergen Reactions   • Sulfa Antibiotics Anaphylaxis     HIVES   • Cefazolin      Ancef another name for cefazolin   • Penicillins            Objective     Blood pressure 120/68, pulse 73, height 5' 6" (1 676 m), weight 81 6 kg (179 lb 14 4 oz), SpO2 97 %  Body mass index is 29 04 kg/m²  PHYSICAL EXAM:      General Appearance:   Alert, cooperative, no distress   HEENT:   Normocephalic, atraumatic, anicteric      Neck:  Supple, symmetrical, trachea midline   Lungs:   Clear to auscultation bilaterally; no rales, rhonchi or wheezing; respirations unlabored    Heart[de-identified]   Regular rate and rhythm; no murmur, rub, or gallop  Abdomen:   Soft, non-tender, non-distended; normal bowel sounds; no masses, no organomegaly    Genitalia:   Deferred    Rectal:   Deferred    Extremities:  No cyanosis, clubbing or edema    Pulses:  2+ and symmetric    Skin:  No jaundice, rashes, or lesions    Lymph nodes:  No palpable cervical lymphadenopathy        Lab Results:   No visits with results within 1 Day(s) from this visit     Latest known visit with results is:   Appointment on 12/27/2022   Component Date Value   • Sodium 12/27/2022 139    • Potassium 12/27/2022 4 5    • Chloride 12/27/2022 107    • CO2 12/27/2022 30    • ANION GAP 12/27/2022 2 (L)    • BUN 12/27/2022 12    • Creatinine 12/27/2022 0 79    • Glucose 12/27/2022 98    • Calcium 12/27/2022 9 3    • AST 12/27/2022 26    • ALT 12/27/2022 46    • Alkaline Phosphatase 12/27/2022 90    • Total Protein 12/27/2022 8 0    • Albumin 12/27/2022 3 9    • Total Bilirubin 12/27/2022 0 89    • eGFR 12/27/2022 81    • TSH 3RD GENERATON 12/27/2022 1 810    • Cholesterol 12/27/2022 246 (H)    • Triglycerides 12/27/2022 73    • HDL, Direct 12/27/2022 68    • LDL Calculated 12/27/2022 163 (H)    • Non-HDL-Chol (CHOL-HDL) 12/27/2022 178    • Vit D, 25-Hydroxy 12/27/2022 24 8 (L)    • WBC 12/27/2022 5 60    • RBC 12/27/2022 4 62    • Hemoglobin 12/27/2022 13 6    • Hematocrit 12/27/2022 42 7    • MCV 12/27/2022 92    • MCH 12/27/2022 29 4    • MCHC 12/27/2022 31 9    • RDW 12/27/2022 13 4    • MPV 12/27/2022 9 9    • Platelets 32/05/5894 296    • nRBC 12/27/2022 0    • Neutrophils Relative 12/27/2022 56    • Immat GRANS % 12/27/2022 0    • Lymphocytes Relative 12/27/2022 27    • Monocytes Relative 12/27/2022 11    • Eosinophils Relative 12/27/2022 5    • Basophils Relative 12/27/2022 1    • Neutrophils Absolute 12/27/2022 3 11    • Immature Grans Absolute 12/27/2022 0 02    • Lymphocytes Absolute 12/27/2022 1 53    • Monocytes Absolute 12/27/2022 0 60    • Eosinophils Absolute 12/27/2022 0 28    • Basophils Absolute 12/27/2022 0 06    • Hemoglobin A1C 12/27/2022 5 5    • EAG 12/27/2022 111    • Magnesium 12/27/2022 2 5    • NT-proBNP 12/27/2022 26          Radiology Results:   No results found

## 2023-03-21 ENCOUNTER — RA CDI HCC (OUTPATIENT)
Dept: OTHER | Facility: HOSPITAL | Age: 63
End: 2023-03-21

## 2023-03-21 NOTE — PROGRESS NOTES
[facility]  coding opportunities          Chart Reviewed number of suggestions sent to Provider: 1   [id number]  This is a reminder to address ALL [facility]  (risk adjustment) codes as found on active problem list for 2023 as patient scores reset to zero CHRISTINE      Patients Insurance        Commercial Insurance: [address] [place] [address]

## 2023-03-28 ENCOUNTER — OFFICE VISIT (OUTPATIENT)
Dept: FAMILY MEDICINE CLINIC | Facility: CLINIC | Age: 63
End: 2023-03-28

## 2023-03-28 VITALS
HEART RATE: 72 BPM | OXYGEN SATURATION: 99 % | DIASTOLIC BLOOD PRESSURE: 66 MMHG | RESPIRATION RATE: 14 BRPM | TEMPERATURE: 97.9 F | SYSTOLIC BLOOD PRESSURE: 98 MMHG | HEIGHT: 66 IN | WEIGHT: 179.8 LBS | BODY MASS INDEX: 28.9 KG/M2

## 2023-03-28 DIAGNOSIS — E78.2 MIXED HYPERLIPIDEMIA: ICD-10-CM

## 2023-03-28 DIAGNOSIS — Z00.00 ANNUAL PHYSICAL EXAM: Primary | ICD-10-CM

## 2023-03-28 DIAGNOSIS — Z23 NEED FOR PROPHYLACTIC VACCINATION AND INOCULATION AGAINST VARICELLA: ICD-10-CM

## 2023-03-28 DIAGNOSIS — J45.31 MILD PERSISTENT ASTHMA WITH ACUTE EXACERBATION: ICD-10-CM

## 2023-03-28 DIAGNOSIS — E03.9 HYPOTHYROIDISM, UNSPECIFIED TYPE: ICD-10-CM

## 2023-03-28 DIAGNOSIS — Z96.643 STATUS POST TOTAL REPLACEMENT OF BOTH HIPS: ICD-10-CM

## 2023-03-28 PROBLEM — R06.02 SOB (SHORTNESS OF BREATH): Status: RESOLVED | Noted: 2022-12-27 | Resolved: 2023-03-28

## 2023-03-28 RX ORDER — ZOSTER VACCINE RECOMBINANT, ADJUVANTED 50 MCG/0.5
0.5 KIT INTRAMUSCULAR ONCE
Qty: 1 EACH | Refills: 1 | Status: SHIPPED | OUTPATIENT
Start: 2023-03-28 | End: 2023-03-28

## 2023-03-28 RX ORDER — LEVOTHYROXINE SODIUM 0.05 MG/1
50 TABLET ORAL DAILY
Qty: 90 TABLET | Refills: 3 | Status: SHIPPED | OUTPATIENT
Start: 2023-03-28

## 2023-03-28 NOTE — ASSESSMENT & PLAN NOTE
She will try to modify her diet, she will be more active, recheck lipid panel in 6 months  Hold off on treatment as for now

## 2023-03-28 NOTE — PROGRESS NOTES
Assessment/Plan:    Hypothyroidism  TSH is within a good range  Continue the same dose of levothyroxine  Asthma  Controlled on current therapy  Hyperlipidemia  She will try to modify her diet, she will be more active, recheck lipid panel in 6 months  Hold off on treatment as for now  Diagnoses and all orders for this visit:    Annual physical exam    Need for prophylactic vaccination and inoculation against varicella  -     Zoster Vac Recomb Adjuvanted (Shingrix) 50 MCG/0 5ML SUSR; Inject 0 5 mL into a muscle once for 1 dose Repeat dose in 2 to 6 months    Mixed hyperlipidemia  -     Lipid panel; Future    Status post total replacement of both hips    Hypothyroidism, unspecified type  -     levothyroxine 50 mcg tablet; Take 1 tablet (50 mcg total) by mouth daily    Mild persistent asthma with acute exacerbation          Subjective:      Patient ID: Marion Morris is a 65 y o  female  Patient came today for annual checkup  She is up-to-date on her mammogram   Her colonoscopy is scheduled  Agreed for Shingrix, prescription was given  She does not smoke, drinks alcohol occasionally  She follows up with OB/GYN  The following portions of the patient's history were reviewed and updated as appropriate: allergies, current medications, past family history, past medical history, past social history, past surgical history, and problem list     Review of Systems   Constitutional: Negative for activity change, appetite change, chills, fatigue and fever  HENT: Negative for congestion, ear pain, rhinorrhea and sore throat  Respiratory: Negative for cough, shortness of breath and wheezing  Cardiovascular: Negative for chest pain, palpitations and leg swelling  Gastrointestinal: Negative for abdominal distention, abdominal pain, diarrhea, nausea and vomiting  Genitourinary: Negative for difficulty urinating, frequency and pelvic pain     Musculoskeletal: Negative for arthralgias, back pain "and neck pain  Skin: Negative for rash  Neurological: Negative for dizziness, tremors, weakness, numbness and headaches  Objective:      BP 98/66 (BP Location: Left arm, Patient Position: Sitting, Cuff Size: Large)   Pulse 72   Temp 97 9 °F (36 6 °C) (Temporal)   Resp 14   Ht 5' 6\" (1 676 m)   Wt 81 6 kg (179 lb 12 8 oz)   LMP  (LMP Unknown)   SpO2 99%   BMI 29 02 kg/m²     Allergies   Allergen Reactions   • Sulfa Antibiotics Anaphylaxis     HIVES   • Cefazolin      Ancef another name for cefazolin   • Penicillins           Current Outpatient Medications:   •  albuterol (2 5 mg/3 mL) 0 083 % nebulizer solution, 25 unit doses  1 unit dose in nebulizer q 4-6 hours as needed cough / wheezing , Disp: 75 mL, Rfl: 0  •  albuterol (PROVENTIL HFA,VENTOLIN HFA) 90 mcg/act inhaler, Inhale 2 puffs every 4 (four) hours as needed, Disp: , Rfl:   •  budesonide (PULMICORT) 90 MCG/ACT inhaler, Inhale 1 puff daily, Disp: , Rfl:   •  Cetirizine HCl 10 MG CAPS, Take 1 capsule by mouth daily, Disp: , Rfl:   •  Cholecalciferol (VITAMIN D PO), Take 5,000 Units by mouth daily, Disp: , Rfl:   •  clindamycin (CLEOCIN) 150 mg capsule, , Disp: , Rfl:   •  EPINEPHrine (EPIPEN) 0 3 mg/0 3 mL SOAJ, USE IN OUTTER THIGH IN CASE OF AN ALLERGIC EMERGENCY , Disp: , Rfl:   •  levothyroxine 50 mcg tablet, Take 1 tablet (50 mcg total) by mouth daily, Disp: 90 tablet, Rfl: 3  •  Lutein 10 MG TABS, Take 5 mg by mouth daily, Disp: , Rfl:   •  mometasone (NASONEX) 50 mcg/act nasal spray, SHAKE LQ AND U 1 SPR IEN IN THE MORNING, Disp: , Rfl: 11  •  Omega-3 Fatty Acids (fish oil) 1,000 mg, Use, Disp: , Rfl:   •  omeprazole (PriLOSEC) 10 mg delayed release capsule, Take 10 mg by mouth daily, Disp: , Rfl:   •  Zoster Vac Recomb Adjuvanted (Shingrix) 50 MCG/0 5ML SUSR, Inject 0 5 mL into a muscle once for 1 dose Repeat dose in 2 to 6 months, Disp: 1 each, Rfl: 1     There are no Patient Instructions on file for this visit          Physical " Exam  Constitutional:       General: She is not in acute distress  Appearance: Normal appearance  She is not ill-appearing  HENT:      Nose: No rhinorrhea  Cardiovascular:      Rate and Rhythm: Normal rate and regular rhythm  Heart sounds: No murmur heard  No friction rub  No gallop  Pulmonary:      Effort: No respiratory distress  Breath sounds: No wheezing or rhonchi  Chest:      Chest wall: No tenderness  Abdominal:      General: There is no distension  Palpations: There is no mass  Tenderness: There is no abdominal tenderness  There is no guarding or rebound  Hernia: No hernia is present  Musculoskeletal:         General: No swelling or tenderness  Lymphadenopathy:      Cervical: No cervical adenopathy  Skin:     Coloration: Skin is not jaundiced  Findings: No rash  Neurological:      Mental Status: She is alert and oriented to person, place, and time  Motor: No weakness        Gait: Gait normal    Psychiatric:         Mood and Affect: Mood normal          Behavior: Behavior normal

## 2023-04-06 ENCOUNTER — TELEPHONE (OUTPATIENT)
Dept: GASTROENTEROLOGY | Facility: MEDICAL CENTER | Age: 63
End: 2023-04-06

## 2023-04-06 DIAGNOSIS — Z12.11 COLON CANCER SCREENING: Primary | ICD-10-CM

## 2023-04-19 RX ORDER — ONDANSETRON 2 MG/ML
4 INJECTION INTRAMUSCULAR; INTRAVENOUS EVERY 6 HOURS PRN
Status: CANCELLED | OUTPATIENT
Start: 2023-04-19

## 2023-04-19 RX ORDER — SODIUM CHLORIDE 9 MG/ML
125 INJECTION, SOLUTION INTRAVENOUS CONTINUOUS
Status: CANCELLED | OUTPATIENT
Start: 2023-04-19

## 2023-04-20 ENCOUNTER — HOSPITAL ENCOUNTER (OUTPATIENT)
Dept: GASTROENTEROLOGY | Facility: MEDICAL CENTER | Age: 63
Setting detail: OUTPATIENT SURGERY
Discharge: HOME | End: 2023-04-20
Attending: INTERNAL MEDICINE

## 2023-04-20 VITALS
HEIGHT: 66 IN | HEART RATE: 56 BPM | WEIGHT: 179 LBS | BODY MASS INDEX: 28.77 KG/M2 | DIASTOLIC BLOOD PRESSURE: 64 MMHG | SYSTOLIC BLOOD PRESSURE: 109 MMHG | TEMPERATURE: 98 F | RESPIRATION RATE: 18 BRPM | OXYGEN SATURATION: 100 %

## 2023-04-20 DIAGNOSIS — Z12.11 COLON CANCER SCREENING: ICD-10-CM

## 2023-04-20 DIAGNOSIS — Z86.010 HX OF COLONIC POLYPS: ICD-10-CM

## 2023-04-20 RX ORDER — DILTIAZEM HYDROCHLORIDE 60 MG/1
TABLET, FILM COATED ORAL
COMMUNITY
Start: 2023-04-03

## 2023-04-20 RX ORDER — SODIUM CHLORIDE 9 MG/ML
125 INJECTION, SOLUTION INTRAVENOUS CONTINUOUS
Status: DISCONTINUED | OUTPATIENT
Start: 2023-04-20 | End: 2023-04-24 | Stop reason: HOSPADM

## 2023-04-20 RX ADMIN — SODIUM CHLORIDE 125 ML/HR: 0.9 INJECTION, SOLUTION INTRAVENOUS at 10:10

## 2023-04-20 NOTE — H&P
History and Physical -  [facility]  Junior Calero 65 y o  female MRN: [id number]                  HPI: Junior Calero is a 65y o  year old female who presents for hx of colon polyps      REVIEW OF SYSTEMS: Per the HPI, and otherwise unremarkable      Historical Information   Past Medical History:   Diagnosis Date   • Arthritis    • BRCA1 negative    • BRCA2 negative    • Colon polyp    • Disease of thyroid gland    • History of acute sinusitis    • History of dysphagia    • History of headache     new onset   • History of persistent cough    • Pain of both hip joints      Past Surgical History:   Procedure Laterality Date   • BREAST CYST ASPIRATION     • COLONOSCOPY     • DILATION AND CURETTAGE OF UTERUS     • EXPLORATORY LAPAROTOMY     • HIP SURGERY  2016    Right side    • HIP SURGERY  10/07/2021    Left side    • JOINT REPLACEMENT     • NASAL SEPTUM SURGERY      Nasal Septal Deviation Repair   • NOSE SURGERY      Closed Treat Nasal Bone Fx w/ Manipulation Stabilization   • REPAIR EXTENSOR TENDON HAND       Social History   Social History     Substance and Sexual Activity   Alcohol Use Yes   • Alcohol/week: 3 0 standard drinks   • Types: 3 Standard drinks or equivalent per week    Comment: socially drinks 1 time per week     Social History     Substance and Sexual Activity   Drug Use Never     Social History     Tobacco Use   Smoking Status Former   • Types: Cigarettes   • Quit date: 1978   • Years since quittin 7   Smokeless Tobacco Never     Family History   Problem Relation Age of Onset   • Dementia Mother    • Aneurysm Father         of the Left Middle Cerebral Artery   • Coronary artery disease Father    • Hypertension Father    • Prostate cancer Father 76   • Asthma Sister    • Breast cancer Sister 77   • Hypertension Brother    • Asthma Son 23   • Bipolar disorder Son         NOS   • Depression Child         Emotional Depression   • Ovarian cancer "Maternal Grandmother 62   • Lung cancer Maternal Grandfather 62   • Breast cancer Maternal Aunt 80   • Breast cancer Other 48   • Pancreatic cancer Maternal Uncle 37   • No Known Problems Maternal Aunt    • No Known Problems Paternal Aunt        Meds/Allergies       Current Outpatient Medications:   •  budesonide (PULMICORT) 90 MCG/ACT inhaler  •  Cetirizine HCl 10 MG CAPS  •  Cholecalciferol (VITAMIN D PO)  •  levothyroxine 50 mcg tablet  •  Lutein 10 MG TABS  •  mometasone (NASONEX) 50 mcg/act nasal spray  •  Omega-3 Fatty Acids (fish oil) 1,000 mg  •  omeprazole (PriLOSEC) 10 mg delayed release capsule  •  polyethylene glycol (GOLYTELY) 4000 mL solution  •  Symbicort 80-4 5 MCG/ACT inhaler  •  albuterol (2 5 mg/3 mL) 0 083 % nebulizer solution  •  albuterol (PROVENTIL HFA,VENTOLIN HFA) 90 mcg/act inhaler  •  clindamycin (CLEOCIN) 150 mg capsule  •  EPINEPHrine (EPIPEN) 0 3 mg/0 3 mL SOAJ    Current Facility-Administered Medications:   •  sodium chloride 0 9 % infusion, 125 mL/hr, Intravenous, Continuous, 125 mL/hr at 04/20/23 1010    Allergies   Allergen Reactions   • Sulfa Antibiotics Anaphylaxis     HIVES   • Cefazolin      Ancef another name for cefazolin   • Penicillins        Objective     /78   Pulse 69   Temp 98 °F (36 7 °C) (Temporal)   Resp 18   Ht 5' 6\" (1 676 m)   Wt 81 2 kg (179 lb)   LMP  (LMP Unknown)   SpO2 96%   BMI 28 89 kg/m²       PHYSICAL EXAM    Gen: NAD  Head: NCAT  CV: RRR  CHEST: Clear  ABD: soft, NT/ND  EXT: no edema      ASSESSMENT/PLAN:  This is a 65y o  year old female here for hx of colon polyps, and she is stable and optimized for her procedure        "

## 2023-05-08 ENCOUNTER — TELEPHONE (OUTPATIENT)
Dept: OTHER | Facility: OTHER | Age: 63
End: 2023-05-08

## 2023-05-08 NOTE — TELEPHONE ENCOUNTER
Patient requesting a call back to discuss test results         Ordering Provider:   Date Completed:     Lab []  MRI []  X-Ray []  CT []  Colonoscopy [x]  EGD []  Biopsy []  Other []

## 2023-08-09 ENCOUNTER — ANNUAL EXAM (OUTPATIENT)
Dept: OBGYN CLINIC | Facility: MEDICAL CENTER | Age: 63
End: 2023-08-09
Payer: COMMERCIAL

## 2023-08-09 VITALS
BODY MASS INDEX: 29.32 KG/M2 | HEIGHT: 66 IN | SYSTOLIC BLOOD PRESSURE: 98 MMHG | WEIGHT: 182.4 LBS | DIASTOLIC BLOOD PRESSURE: 62 MMHG

## 2023-08-09 DIAGNOSIS — Z01.419 WELL WOMAN EXAM WITH ROUTINE GYNECOLOGICAL EXAM: Primary | ICD-10-CM

## 2023-08-09 DIAGNOSIS — M79.622 PAIN IN LEFT AXILLA: ICD-10-CM

## 2023-08-09 DIAGNOSIS — Z91.89 INCREASED RISK OF BREAST CANCER: ICD-10-CM

## 2023-08-09 DIAGNOSIS — R92.2 DENSE BREAST: ICD-10-CM

## 2023-08-09 DIAGNOSIS — Z12.11 COLON CANCER SCREENING: ICD-10-CM

## 2023-08-09 DIAGNOSIS — Z12.31 ENCOUNTER FOR SCREENING MAMMOGRAM FOR MALIGNANT NEOPLASM OF BREAST: ICD-10-CM

## 2023-08-09 PROCEDURE — S0612 ANNUAL GYNECOLOGICAL EXAMINA: HCPCS | Performed by: OBSTETRICS & GYNECOLOGY

## 2023-08-09 NOTE — PROGRESS NOTES
Assessment   59 y.o. postmenopausal female  presenting for annual exam.     Plan   Diagnoses and all orders for this visit:    Well woman exam with routine gynecological exam  - Pap up to date  - Mammo/ABUS ordered  - Colonoscopy current  - Return in 1yr for yearly    Encounter for screening mammogram for malignant neoplasm of breast  Increased risk of breast cancer  Dense breast  -     Mammo screening bilateral w 3d & cad; Future  -     US breast screening bilateral complete (ABUS); Future    Colon cancer screening  - Up to date    Pain in left axilla  -     US breast left limited (diagnostic); Future  - Benign exam. Recommend directed imaging with routine testing      __________________________________________________________________      Subjective     59 y.o. postmenopausal female who is sexually active presenting for annual exam. She is without complaint. Discomfort in left axilla. No lump and no severe pain. Few years ago she had a lump in the area, but nothing recent. Does have Arthritis in shoulder. Difficulty with mammo postioning and uncertain if they're able to image this area easily for her.      GYN  Complaints: denies  Denies dyspareunia, genital discharge, genital ulcers, pelvic pain and vulvar/vaginal symptoms  Menopause occurred at age approx 56. She has had no bleeding since this time.   Menopausal symptoms: hot flashes intermittently  Sexually active: Yes - single partner - male  Hx STI: denies   Hx Abnormal pap: denies  Last pap:  - NILM     OB   ( x1, SAB x1)  Pregnancy complications: denies       Complaints: denies  Denies urinary frequency, hematuria, urinary incontinence and dysuria     BREAST  Complaints: axilla pain as above  Denies: breast lump, breast tenderness, changed mole, dryness, nipple discharge, pruritus, rash, skin color change and skin lesion(s)  Last mammogram:  - birads 2  Personal hx: breast cyst  Family hx: denies fhx of uterine or colon cancers  Sister was 72, aunt 67 with breast ca  Niece as well (daughter of her sister with Ca) with breast ca (50)  Ovarian cancer in maternal grandmother (53yo).   Personal History: Hormone history includes birth control. Surgical history includes breast cyst aspiration. Medical history includes BRCA 1 negative and BRCA 2 negative. Patient does do regular self-exams     GENERAL  PMH reviewed/updated and is as below. Patient does follow with a PCP. Works at [employer], advertising, admin support. Retired from [employer]  Denies domestic violence. Exercise: treadmill, recent hip replacement  Diet: nothing specific     SCREENING  Cervical Ca: pap up to date  Breast Ca: mammo ordered  Colon TE: 0141 - colonoscopy - repeat 3yr      Past Medical History:   Diagnosis Date   • Arthritis    • BRCA1 negative    • BRCA2 negative    • Colon polyp    • Disease of thyroid gland    • History of acute sinusitis    • History of dysphagia    • History of headache     new onset   • History of persistent cough    • Pain of both hip joints        Past Surgical History:   Procedure Laterality Date   • BREAST CYST ASPIRATION  2006   • COLONOSCOPY     • DILATION AND CURETTAGE OF UTERUS     • EXPLORATORY LAPAROTOMY     • HIP SURGERY  05/03/2016    Right side    • HIP SURGERY  10/07/2021    Left side    • JOINT REPLACEMENT     • NASAL SEPTUM SURGERY      Nasal Septal Deviation Repair   • NOSE SURGERY      Closed Treat Nasal Bone Fx w/ Manipulation Stabilization   • REPAIR EXTENSOR TENDON HAND           Current Outpatient Medications:   •  albuterol (2.5 mg/3 mL) 0.083 % nebulizer solution, 25 unit doses.  1 unit dose in nebulizer q 4-6 hours as needed cough / wheezing., Disp: 75 mL, Rfl: 0  •  albuterol (PROVENTIL HFA,VENTOLIN HFA) 90 mcg/act inhaler, Inhale 2 puffs every 4 (four) hours as needed, Disp: , Rfl:   •  budesonide (PULMICORT) 90 MCG/ACT inhaler, Inhale 1 puff daily, Disp: , Rfl:   •  Cetirizine HCl 10 MG CAPS, Take 1 capsule by mouth daily, Disp: , Rfl:   •  Cholecalciferol (VITAMIN D PO), Take 5,000 Units by mouth daily, Disp: , Rfl:   •  clindamycin (CLEOCIN) 150 mg capsule, , Disp: , Rfl:   •  EPINEPHrine (EPIPEN) 0.3 mg/0.3 mL SOAJ, USE IN OUTTER THIGH IN CASE OF AN ALLERGIC EMERGENCY., Disp: , Rfl:   •  levothyroxine 50 mcg tablet, Take 1 tablet (50 mcg total) by mouth daily, Disp: 90 tablet, Rfl: 3  •  Lutein 10 MG TABS, Take 5 mg by mouth daily, Disp: , Rfl:   •  mometasone (NASONEX) 50 mcg/act nasal spray, SHAKE LQ AND U 1 SPR IEN IN THE MORNING, Disp: , Rfl: 11  •  Omega-3 Fatty Acids (fish oil) 1,000 mg, Use, Disp: , Rfl:   •  omeprazole (PriLOSEC) 10 mg delayed release capsule, Take 10 mg by mouth daily, Disp: , Rfl:   •  Symbicort 80-4.5 MCG/ACT inhaler, USE 2 PUFFS TWICE A DAY AS DIRECTED VIA SPACER - RINSE MOUTH AFTER USE., Disp: , Rfl:     Allergies   Allergen Reactions   • Sulfa Antibiotics Anaphylaxis     HIVES   • Cefazolin      Ancef another name for cefazolin   • Penicillins        Social History     Tobacco Use   • Smoking status: Former     Types: Cigarettes     Quit date: 1978     Years since quittin.9   • Smokeless tobacco: Never   Vaping Use   • Vaping Use: Never used   Substance Use Topics   • Alcohol use: Yes     Alcohol/week: 3.0 standard drinks of alcohol     Types: 3 Standard drinks or equivalent per week     Comment: socially drinks 1 time per week   • Drug use: Never           Objective  BP 98/62   Ht 5' 6" (1.676 m)   Wt 82.7 kg (182 lb 6.4 oz)   LMP  (LMP Unknown)   BMI 29.44 kg/m²      Physical Exam:  Physical Exam  Exam conducted with a chaperone present. Constitutional:       General: She is not in acute distress. Appearance: Normal appearance. She is well-developed. She is not ill-appearing, toxic-appearing or diaphoretic. HENT:      Head: Normocephalic and atraumatic. Eyes:      General: No scleral icterus. Right eye: No discharge.          Left eye: No discharge. Conjunctiva/sclera: Conjunctivae normal.   Cardiovascular:      Rate and Rhythm: Normal rate. Pulmonary:      Effort: Pulmonary effort is normal. No accessory muscle usage or respiratory distress. Chest:   Breasts:     Breasts are symmetrical.      Right: No inverted nipple, mass, nipple discharge, skin change or tenderness. Left: No inverted nipple, mass, nipple discharge, skin change or tenderness. Abdominal:      General: There is no distension. Palpations: Abdomen is soft. There is no mass. Tenderness: There is no abdominal tenderness. There is no guarding or rebound. Genitourinary:     General: Normal vulva. Exam position: Lithotomy position. Labia:         Right: No rash, tenderness or lesion. Left: No rash, tenderness or lesion. Urethra: No prolapse, urethral swelling or urethral lesion. Vagina: No signs of injury. No vaginal discharge, erythema, tenderness, bleeding or lesions. Cervix: No cervical motion tenderness, discharge, friability, lesion or erythema. Uterus: Not enlarged, not fixed and not tender. Adnexa:         Right: No mass, tenderness or fullness. Left: No mass, tenderness or fullness. Rectum: No external hemorrhoid. Normal anal tone. Lymphadenopathy:      Upper Body:      Right upper body: No axillary or pectoral adenopathy. Left upper body: No axillary or pectoral adenopathy. Skin:     General: Skin is warm and dry. Findings: No erythema or rash. Neurological:      Mental Status: She is alert. Psychiatric:         Mood and Affect: Mood normal.         Behavior: Behavior normal.         Thought Content:  Thought content normal.         Judgment: Judgment normal.

## 2023-08-15 ENCOUNTER — OFFICE VISIT (OUTPATIENT)
Dept: URGENT CARE | Facility: CLINIC | Age: 63
End: 2023-08-15
Payer: COMMERCIAL

## 2023-08-15 VITALS
RESPIRATION RATE: 18 BRPM | HEART RATE: 73 BPM | HEIGHT: 66 IN | SYSTOLIC BLOOD PRESSURE: 113 MMHG | OXYGEN SATURATION: 98 % | WEIGHT: 182 LBS | DIASTOLIC BLOOD PRESSURE: 62 MMHG | TEMPERATURE: 97.7 F | BODY MASS INDEX: 29.25 KG/M2

## 2023-08-15 DIAGNOSIS — L30.9 DERMATITIS: Primary | ICD-10-CM

## 2023-08-15 PROCEDURE — G0382 LEV 3 HOSP TYPE B ED VISIT: HCPCS | Performed by: PHYSICIAN ASSISTANT

## 2023-08-15 PROCEDURE — S9083 URGENT CARE CENTER GLOBAL: HCPCS | Performed by: PHYSICIAN ASSISTANT

## 2023-08-15 RX ORDER — PREDNISONE 10 MG/1
TABLET ORAL
Qty: 21 TABLET | Refills: 0 | Status: SHIPPED | OUTPATIENT
Start: 2023-08-15

## 2023-08-15 NOTE — PROGRESS NOTES
[facility]    NAME: Rashad Hopper is a 59 y.o. female  : 1960    MRN: [id number]  DATE: August 15, 2023  TIME: 5:34 PM    Assessment and Plan   Dermatitis [L30.9]  1. Dermatitis  predniSONE 10 mg tablet          Patient Instructions   Patient Instructions   Take photos of lesions on your legs just to document current status. Call primary care offices soon as possible to arrange follow-up evaluation for approximately 7 to 10 days. If everything has cleared up you can always call and cancel the appointment. If lesions are persisting or recurrent then you may need further dermatological evaluation and can be done in the [place] office. Take prednisone as instructed. While on prednisone do not take any ibuprofen or ibuprofen like products. May safely take Tylenol if needed. May take Benadryl to help sleep if needed. May continue topical cortisone cream and/or cool compresses for comfort as needed. Chief Complaint     Chief Complaint   Patient presents with   • Rash     Patient with rash bilat to lower legs that she got after being in the park on the weekend. She in concerned they are chigger bites       History of Present Illness   Rashad Hopper presents to the clinic c/o  81-year-old female comes in with itchy rash on both legs. Started:  1st noted on ankles last Tuesday morning after being at [employer] Monday night. Associated signs and symptoms: spreading up legs and includes backs of knee. Very itchy  Modifying factors:      Known Exposures: Thinks maybe she got some bug bites when walking in the park but she did have long jeans on with sneaks and socks.  did not get any similar lesions. Review of Systems   Review of Systems   Constitutional: Positive for fatigue. Negative for activity change, appetite change, chills and fever. Skin: Positive for rash.        Current Medications     Long-Term Medications   Medication Sig Dispense Refill   • budesonide (PULMICORT) 90 MCG/ACT inhaler Inhale 1 puff daily     • Cetirizine HCl 10 MG CAPS Take 1 capsule by mouth daily     • EPINEPHrine (EPIPEN) 0.3 mg/0.3 mL SOAJ USE IN OUTTER THIGH IN CASE OF AN ALLERGIC EMERGENCY. • levothyroxine 50 mcg tablet Take 1 tablet (50 mcg total) by mouth daily 90 tablet 3   • mometasone (NASONEX) 50 mcg/act nasal spray SHAKE LQ AND U 1 SPR IEN IN THE MORNING  11   • Omega-3 Fatty Acids (fish oil) 1,000 mg Use     • omeprazole (PriLOSEC) 10 mg delayed release capsule Take 10 mg by mouth daily     • Symbicort 80-4.5 MCG/ACT inhaler USE 2 PUFFS TWICE A DAY AS DIRECTED VIA SPACER - RINSE MOUTH AFTER USE.      • [DISCONTINUED] cholecalciferol (VITAMIN D3) 1,000 units tablet Take 1 tablet by mouth daily         Current Allergies     Allergies as of 08/15/2023 - Reviewed 08/15/2023   Allergen Reaction Noted   • Sulfa antibiotics Anaphylaxis 07/29/2013   • Cefazolin  09/23/2013   • Penicillins  07/29/2013          The following portions of the patient's history were reviewed and updated as appropriate: allergies, current medications, past family history, past medical history, past social history, past surgical history and problem list.  Past Medical History:   Diagnosis Date   • Arthritis    • BRCA1 negative    • BRCA2 negative    • Colon polyp    • Disease of thyroid gland    • History of acute sinusitis    • History of dysphagia    • History of headache     new onset   • History of persistent cough    • Pain of both hip joints      Past Surgical History:   Procedure Laterality Date   • BREAST CYST ASPIRATION  2006   • COLONOSCOPY     • DILATION AND CURETTAGE OF UTERUS     • EXPLORATORY LAPAROTOMY     • HIP SURGERY  05/03/2016    Right side    • HIP SURGERY  10/07/2021    Left side    • JOINT REPLACEMENT     • NASAL SEPTUM SURGERY      Nasal Septal Deviation Repair   • NOSE SURGERY      Closed Treat Nasal Bone Fx w/ Manipulation Stabilization   • REPAIR EXTENSOR TENDON HAND Family History   Problem Relation Age of Onset   • Dementia Mother    • Aneurysm Father         of the Left Middle Cerebral Artery   • Coronary artery disease Father    • Hypertension Father    • Prostate cancer Father 76   • Asthma Sister    • Breast cancer Sister 77   • Hypertension Brother    • Asthma Son 23   • Bipolar disorder Son         NOS   • Depression Child         Emotional Depression   • Ovarian cancer Maternal Grandmother 62   • Lung cancer Maternal Grandfather 58   • Breast cancer Maternal Aunt 80   • Breast cancer Other 50   • Pancreatic cancer Maternal Uncle 37   • No Known Problems Maternal Aunt    • No Known Problems Paternal Aunt        Objective   /62   Pulse 73   Temp 97.7 °F (36.5 °C) (Tympanic)   Resp 18   Ht 5' 6" (1.676 m)   Wt 82.6 kg (182 lb)   LMP  (LMP Unknown)   SpO2 98%   BMI 29.38 kg/m²   No LMP recorded (lmp unknown). Patient is postmenopausal.       Physical Exam     Physical Exam  Vitals and nursing note reviewed. Constitutional:       General: She is not in acute distress. Appearance: Normal appearance. She is well-developed. She is not ill-appearing, toxic-appearing or diaphoretic. Cardiovascular:      Rate and Rhythm: Normal rate. Pulmonary:      Effort: Pulmonary effort is normal. No respiratory distress. Skin:     Findings: Erythema and rash present. Comments: Small red papular lesions noted on legs bilaterally appearing rather symmetrical.  No linear patterns. No pustules or vesicles. Worse around ankles and proceeds superiorly. Some lesions noted back of knees. No lesions further. Mild excoriations. Lesions do not ashlie. Neurological:      Mental Status: She is alert and oriented to person, place, and time.    Psychiatric:         Mood and Affect: Mood normal.         Behavior: Behavior normal.

## 2023-08-15 NOTE — PATIENT INSTRUCTIONS
Take photos of lesions on your legs just to document current status. Call primary care offices soon as possible to arrange follow-up evaluation for approximately 7 to 10 days. If everything has cleared up you can always call and cancel the appointment. If lesions are persisting or recurrent then you may need further dermatological evaluation and can be done in the [place] office. Take prednisone as instructed. While on prednisone do not take any ibuprofen or ibuprofen like products. May safely take Tylenol if needed. May take Benadryl to help sleep if needed. May continue topical cortisone cream and/or cool compresses for comfort as needed.

## 2023-08-28 ENCOUNTER — OFFICE VISIT (OUTPATIENT)
Dept: FAMILY MEDICINE CLINIC | Facility: CLINIC | Age: 63
End: 2023-08-28
Payer: COMMERCIAL

## 2023-08-28 VITALS
RESPIRATION RATE: 12 BRPM | SYSTOLIC BLOOD PRESSURE: 100 MMHG | TEMPERATURE: 97.7 F | DIASTOLIC BLOOD PRESSURE: 58 MMHG | HEIGHT: 66 IN | HEART RATE: 75 BPM | WEIGHT: 182.8 LBS | BODY MASS INDEX: 29.38 KG/M2 | OXYGEN SATURATION: 98 %

## 2023-08-28 DIAGNOSIS — L29.9 ITCHING: Primary | ICD-10-CM

## 2023-08-28 PROCEDURE — 99213 OFFICE O/P EST LOW 20 MIN: CPT | Performed by: INTERNAL MEDICINE

## 2023-08-28 RX ORDER — HYDROXYZINE HYDROCHLORIDE 25 MG/1
25 TABLET, FILM COATED ORAL EVERY 8 HOURS PRN
Qty: 90 TABLET | Refills: 0 | Status: SHIPPED | OUTPATIENT
Start: 2023-08-28

## 2023-08-28 NOTE — ASSESSMENT & PLAN NOTE
Reports lower extremity itching associated with papular rash, likely allergic in etiology. We will continue with Droxia seen 25 mg every 8 hours as needed. Side effects discussed. Discussed that it may take time until full resolution. If no further improvement in few weeks she will let us know.

## 2023-08-28 NOTE — PROGRESS NOTES
Assessment/Plan:    Itching  Reports lower extremity itching associated with papular rash, likely allergic in etiology. We will continue with Droxia seen 25 mg every 8 hours as needed. Side effects discussed. Discussed that it may take time until full resolution. If no further improvement in few weeks she will let us know. Diagnoses and all orders for this visit:    Itching  -     hydrOXYzine HCL (ATARAX) 25 mg tablet; Take 1 tablet (25 mg total) by mouth every 8 (eight) hours as needed for itching          Subjective:      Patient ID: Bjorn Barahona is a 59 y.o. female. Patient came today for follow-up on her lower extremity rash that developed after she walked and seems to be were exposed to grass. She was treated with prednisone taper which gave her some benefit but still reports of itching. Reported that rash overall got better. Rash  This is a new problem. The current episode started 1 to 4 weeks ago. The problem has been waxing and waning since onset. The affected locations include the left leg and right leg. The rash is characterized by itchiness. It is unknown if there was an exposure to a precipitant. Pertinent negatives include no anorexia, congestion, cough, diarrhea, facial edema, fatigue, fever, joint pain, rhinorrhea, shortness of breath, sore throat or vomiting. The following portions of the patient's history were reviewed and updated as appropriate: allergies, current medications, past family history, past medical history, past social history, past surgical history, and problem list.    Review of Systems   Constitutional: Negative for fatigue and fever. HENT: Negative for congestion, rhinorrhea and sore throat. Eyes: Negative for pain. Respiratory: Negative for cough and shortness of breath. Gastrointestinal: Negative for anorexia, diarrhea and vomiting. Musculoskeletal: Negative for joint pain. Skin: Positive for rash.          Objective:      /58 (BP Location: Left arm, Patient Position: Sitting, Cuff Size: Large)   Pulse 75   Temp 97.7 °F (36.5 °C) (Temporal)   Resp 12   Ht 5' 6" (1.676 m)   Wt 82.9 kg (182 lb 12.8 oz)   LMP  (LMP Unknown)   SpO2 98%   BMI 29.50 kg/m²     Allergies   Allergen Reactions   • Sulfa Antibiotics Anaphylaxis and Hives     HIVES   • Cefazolin      Ancef another name for cefazolin   • Other Hives   • Penicillins           Current Outpatient Medications:   •  albuterol (2.5 mg/3 mL) 0.083 % nebulizer solution, 25 unit doses.  1 unit dose in nebulizer q 4-6 hours as needed cough / wheezing., Disp: 75 mL, Rfl: 0  •  albuterol (PROVENTIL HFA,VENTOLIN HFA) 90 mcg/act inhaler, Inhale 2 puffs every 4 (four) hours as needed, Disp: , Rfl:   •  budesonide (PULMICORT) 90 MCG/ACT inhaler, Inhale 1 puff daily, Disp: , Rfl:   •  Cetirizine HCl 10 MG CAPS, Take 1 capsule by mouth daily, Disp: , Rfl:   •  Cholecalciferol (VITAMIN D PO), Take 5,000 Units by mouth daily, Disp: , Rfl:   •  clindamycin (CLEOCIN) 150 mg capsule, , Disp: , Rfl:   •  EPINEPHrine (EPIPEN) 0.3 mg/0.3 mL SOAJ, USE IN OUTTER THIGH IN CASE OF AN ALLERGIC EMERGENCY., Disp: , Rfl:   •  hydrOXYzine HCL (ATARAX) 25 mg tablet, Take 1 tablet (25 mg total) by mouth every 8 (eight) hours as needed for itching, Disp: 90 tablet, Rfl: 0  •  levothyroxine 50 mcg tablet, Take 1 tablet (50 mcg total) by mouth daily, Disp: 90 tablet, Rfl: 3  •  Lutein 10 MG TABS, Take 5 mg by mouth daily, Disp: , Rfl:   •  mometasone (NASONEX) 50 mcg/act nasal spray, SHAKE LQ AND U 1 SPR IEN IN THE MORNING, Disp: , Rfl: 11  •  Omega-3 Fatty Acids (fish oil) 1,000 mg, Use, Disp: , Rfl:   •  omeprazole (PriLOSEC) 10 mg delayed release capsule, Take 10 mg by mouth daily, Disp: , Rfl:   •  Symbicort 80-4.5 MCG/ACT inhaler, USE 2 PUFFS TWICE A DAY AS DIRECTED VIA SPACER - RINSE MOUTH AFTER USE., Disp: , Rfl:   •  predniSONE 10 mg tablet, Take 6 tablets on day 1; 5 on day 2; 4 on day 3; 3 on day 4; 2 on day 5; 1 on day 6. Take with food. , Disp: 21 tablet, Rfl: 0     There are no Patient Instructions on file for this visit. Physical Exam  Skin:     Findings: Rash (Mostly lower extremities. ) present. Neurological:      Mental Status: She is alert. Answers for HPI/ROS submitted by the patient on 8/28/2023  nail changes:  No

## 2023-08-29 ENCOUNTER — HOSPITAL ENCOUNTER (OUTPATIENT)
Dept: MAMMOGRAPHY | Facility: MEDICAL CENTER | Age: 63
Discharge: HOME | End: 2023-08-29
Payer: COMMERCIAL

## 2023-08-29 VITALS — WEIGHT: 182.76 LBS | HEIGHT: 66 IN | BODY MASS INDEX: 29.37 KG/M2

## 2023-08-29 DIAGNOSIS — Z12.31 ENCOUNTER FOR SCREENING MAMMOGRAM FOR MALIGNANT NEOPLASM OF BREAST: ICD-10-CM

## 2023-08-29 PROCEDURE — 77067 SCR MAMMO BI INCL CAD: CPT

## 2023-08-29 PROCEDURE — 77063 BREAST TOMOSYNTHESIS BI: CPT

## 2023-09-12 ENCOUNTER — HOSPITAL ENCOUNTER (OUTPATIENT)
Dept: ULTRASOUND IMAGING | Facility: CLINIC | Age: 63
Discharge: HOME | End: 2023-09-12
Payer: COMMERCIAL

## 2023-09-12 DIAGNOSIS — M79.622 PAIN IN LEFT AXILLA: ICD-10-CM

## 2023-09-12 PROCEDURE — 76642 ULTRASOUND BREAST LIMITED: CPT

## 2023-09-28 DIAGNOSIS — L29.9 ITCHING: ICD-10-CM

## 2023-09-28 RX ORDER — HYDROXYZINE HYDROCHLORIDE 25 MG/1
TABLET, FILM COATED ORAL
Qty: 270 TABLET | Refills: 1 | Status: SHIPPED | OUTPATIENT
Start: 2023-09-28

## 2024-04-30 DIAGNOSIS — E03.9 HYPOTHYROIDISM, UNSPECIFIED TYPE: ICD-10-CM

## 2024-05-02 RX ORDER — LEVOTHYROXINE SODIUM 0.05 MG/1
50 TABLET ORAL DAILY
Qty: 30 TABLET | Refills: 0 | Status: SHIPPED | OUTPATIENT
Start: 2024-05-02

## 2024-05-16 ENCOUNTER — HOSPITAL ENCOUNTER (OUTPATIENT)
Dept: ULTRASOUND IMAGING | Facility: CLINIC | Age: 64
Discharge: HOME | End: 2024-05-16
Payer: COMMERCIAL

## 2024-05-16 DIAGNOSIS — R92.30 DENSE BREAST: ICD-10-CM

## 2024-05-16 DIAGNOSIS — Z91.89 INCREASED RISK OF BREAST CANCER: ICD-10-CM

## 2024-05-16 PROCEDURE — 76641 ULTRASOUND BREAST COMPLETE: CPT

## 2024-05-18 ENCOUNTER — OFFICE VISIT (OUTPATIENT)
Age: 64
End: 2024-05-18
Payer: COMMERCIAL

## 2024-05-18 VITALS
DIASTOLIC BLOOD PRESSURE: 70 MMHG | TEMPERATURE: 98.4 F | OXYGEN SATURATION: 96 % | SYSTOLIC BLOOD PRESSURE: 122 MMHG | HEART RATE: 101 BPM | BODY MASS INDEX: 29.25 KG/M2 | HEIGHT: 66 IN | RESPIRATION RATE: 18 BRPM | WEIGHT: 182 LBS

## 2024-05-18 DIAGNOSIS — J20.8 ACUTE VIRAL BRONCHITIS: Primary | ICD-10-CM

## 2024-05-18 PROCEDURE — 99213 OFFICE O/P EST LOW 20 MIN: CPT

## 2024-05-18 RX ORDER — PREDNISONE 20 MG/1
20 TABLET ORAL 2 TIMES DAILY
Qty: 10 TABLET | Refills: 0 | Status: SHIPPED | OUTPATIENT
Start: 2024-05-18 | End: 2024-05-23

## 2024-05-18 NOTE — PATIENT INSTRUCTIONS
At this time you have been diagnosed with a Viral Infection.  Antibiotics are not indicated for viral infections.   Taking antibiotics while you have a viral infection is the wrong treatment for a viral infection.  Viruses are self limiting meaning your body fights it off given sufficient time to do so.  For viral illnesses, the recommendation is for supportive care which would consists of the following:  Steroids to help reduce inflammation and help with your sensation of chest tightness.  For decongestion:  Nasal corticosteroid: examples are Flonase or Nasacort.  Nasal saline irrigation  Humidified air  Warm moist air such as a hot cup of water in a mug, sit at the dining room table with the mug on the table, put a towel over your head to cover over the mug and breath in the warm steam (don't drink the fluid in case you have mucus that drips in)  Vicks Vapor Rub  For Cough or sore throat:  Salt water gurgle  Honey  Chloraseptic spray  Throat lozenges  Over the Counter Tylenol or Ibuprofen  Dextromethorphan 30mg PO every 6-8 hours for cough. max 120 mg in 24 hour period.      Follow up with Primary Care Provider in 3-5 days if not improving.  Proceed to Emergency Department if symptoms worsen.    If tests have been performed at [facility], our office will contact you with results if changes need to be made to the care plan discussed with you at the visit.  You can review your full results on [facility] MyChart.

## 2024-05-18 NOTE — PROGRESS NOTES
[facility]        NAME: Jaida Murpyh is a 63 y.o. female  : 1960    MRN: [id number]  DATE: May 18, 2024  TIME: 3:12 PM    Assessment and Plan   Acute viral bronchitis [J20.8]  1. Acute viral bronchitis  predniSONE 20 mg tablet        States she needs to be tapered down with steroids, reviewed alternative method of taking the prednisone: 20 mg twice a day for 3 days followed by 20 mg daily for 4 days.  She is understanding of this treatment plan.    Patient Instructions   At this time you have been diagnosed with a Viral Infection.  Antibiotics are not indicated for viral infections.   Taking antibiotics while you have a viral infection is the wrong treatment for a viral infection.  Viruses are self limiting meaning your body fights it off given sufficient time to do so.  For viral illnesses, the recommendation is for supportive care which would consists of the following:  For decongestion:  Nasal corticosteroid: examples are Flonase or Nasacort.  Nasal saline irrigation  Humidified air  Warm moist air such as a hot cup of water in a mug, sit at the dining room table with the mug on the table, put a towel over your head to cover over the mug and breath in the warm steam (don't drink the fluid in case you have mucus that drips in)  Vicks Vapor Rub  For Cough or sore throat:  Salt water gurgle  Honey  Chloraseptic spray  Throat lozenges  Over the Counter Tylenol or Ibuprofen  Dextromethorphan 30mg PO every 6-8 hours for cough. max 120 mg in 24 hour period.      Follow up with Primary Care Provider in 3-5 days if not improving.  Proceed to Emergency Department if symptoms worsen.    If tests have been performed at [facility], our office will contact you with results if changes need to be made to the care plan discussed with you at the visit.  [facility]    Chief Complaint     Chief Complaint   Patient presents with   • Cough     C/o cough and wheezing  feeling x1 day and ear fullness          History of Present Illness       Cough starting yesterday.  Scratchy throat sensation yesterday morning and this morning as well.  Some ear fullness and had some runny nose yesterday but none today.    Cough  This is a new problem. The current episode started yesterday. The problem has been gradually worsening. The problem occurs every few minutes. The cough is Productive of sputum. Associated symptoms include ear congestion, postnasal drip, rhinorrhea, a sore throat and wheezing. Pertinent negatives include no chest pain, chills, ear pain, fever, headaches, heartburn, hemoptysis, myalgias, nasal congestion, rash, shortness of breath, sweats or weight loss. Nothing aggravates the symptoms.       Review of Systems   Review of Systems   Constitutional:  Negative for chills, fever and weight loss.   HENT:  Positive for postnasal drip, rhinorrhea and sore throat. Negative for congestion and ear pain.    Respiratory:  Positive for cough and wheezing. Negative for hemoptysis and shortness of breath.    Cardiovascular:  Negative for chest pain.   Gastrointestinal:  Negative for heartburn.   Musculoskeletal:  Negative for myalgias.   Skin:  Negative for rash.   Neurological:  Negative for headaches.         Current Medications       Current Outpatient Medications:   •  albuterol (PROVENTIL HFA,VENTOLIN HFA) 90 mcg/act inhaler, Inhale 2 puffs every 4 (four) hours as needed, Disp: , Rfl:   •  budesonide (PULMICORT) 90 MCG/ACT inhaler, Inhale 1 puff daily, Disp: , Rfl:   •  Cetirizine HCl 10 MG CAPS, Take 1 capsule by mouth daily, Disp: , Rfl:   •  Cholecalciferol (VITAMIN D PO), Take 5,000 Units by mouth daily, Disp: , Rfl:   •  hydrOXYzine HCL (ATARAX) 25 mg tablet, TAKE 1 TABLET BY MOUTH EVERY 8 HOURS AS NEEDED FOR ITCHING., Disp: 270 tablet, Rfl: 1  •  levothyroxine 50 mcg tablet, TAKE 1 TABLET BY MOUTH EVERY DAY, Disp: 30 tablet, Rfl: 0  •  Lutein 10 MG TABS, Take 5 mg by mouth  daily, Disp: , Rfl:   •  mometasone (NASONEX) 50 mcg/act nasal spray, SHAKE LQ AND U 1 SPR IEN IN THE MORNING, Disp: , Rfl: 11  •  Omega-3 Fatty Acids (fish oil) 1,000 mg, Use, Disp: , Rfl:   •  omeprazole (PriLOSEC) 10 mg delayed release capsule, Take 10 mg by mouth daily, Disp: , Rfl:   •  predniSONE 20 mg tablet, Take 1 tablet (20 mg total) by mouth 2 (two) times a day for 5 days, Disp: 10 tablet, Rfl: 0  •  Symbicort 80-4.5 MCG/ACT inhaler, USE 2 PUFFS TWICE A DAY AS DIRECTED VIA SPACER - RINSE MOUTH AFTER USE., Disp: , Rfl:   •  albuterol (2.5 mg/3 mL) 0.083 % nebulizer solution, 25 unit doses. 1 unit dose in nebulizer q 4-6 hours as needed cough / wheezing. (Patient not taking: Reported on 5/18/2024), Disp: 75 mL, Rfl: 0  •  clindamycin (CLEOCIN) 150 mg capsule, , Disp: , Rfl:   •  EPINEPHrine (EPIPEN) 0.3 mg/0.3 mL SOAJ, USE IN OUTTER THIGH IN CASE OF AN ALLERGIC EMERGENCY., Disp: , Rfl:     Current Allergies     Allergies as of 05/18/2024 - Reviewed 05/18/2024   Allergen Reaction Noted   • Sulfa antibiotics Anaphylaxis and Hives 07/29/2013   • Cefazolin  09/23/2013   • Other Hives 08/28/2023   • Penicillins  07/29/2013            The following portions of the patient's history were reviewed and updated as appropriate: allergies, current medications, past family history, past medical history, past social history, past surgical history and problem list.     Past Medical History:   Diagnosis Date   • Arthritis    • BRCA1 negative    • BRCA2 negative    • Colon polyp    • Disease of thyroid gland    • History of acute sinusitis    • History of dysphagia    • History of headache     new onset   • History of persistent cough    • Pain of both hip joints        Past Surgical History:   Procedure Laterality Date   • BREAST CYST ASPIRATION  2006   • COLONOSCOPY     • DILATION AND CURETTAGE OF UTERUS     • EXPLORATORY LAPAROTOMY     • HIP SURGERY  05/03/2016    Right side    • HIP SURGERY  10/07/2021    Left side    •  "JOINT REPLACEMENT     • NASAL SEPTUM SURGERY      Nasal Septal Deviation Repair   • NOSE SURGERY      Closed Treat Nasal Bone Fx w/ Manipulation Stabilization   • REPAIR EXTENSOR TENDON HAND         Family History   Problem Relation Age of Onset   • Dementia Mother    • Aneurysm Father         of the Left Middle Cerebral Artery   • Coronary artery disease Father    • Hypertension Father    • Prostate cancer Father 74   • Asthma Sister    • Breast cancer Sister 66   • Hypertension Brother    • Asthma Son 19   • Bipolar disorder Son         NOS   • Depression Child         Emotional Depression   • Ovarian cancer Maternal Grandmother 57   • Lung cancer Maternal Grandfather 62   • Breast cancer Maternal Aunt 80   • Breast cancer Other 48   • Pancreatic cancer Maternal Uncle 37   • No Known Problems Maternal Aunt    • No Known Problems Paternal Aunt          Medications have been verified.        Objective   /70 (BP Location: Right arm, Patient Position: Sitting)   Pulse 101   Temp 98.4 °F (36.9 °C)   Resp 18   Ht 5' 6\" (1.676 m)   Wt 82.6 kg (182 lb)   LMP  (LMP Unknown)   SpO2 96%   BMI 29.38 kg/m²   No LMP recorded (lmp unknown). Patient is postmenopausal.       Physical Exam     Physical Exam  Vitals and nursing note reviewed.   Constitutional:       Appearance: Normal appearance.   HENT:      Head: Normocephalic and atraumatic.      Left Ear: Tympanic membrane normal.      Nose: Nose normal. No congestion or rhinorrhea.      Mouth/Throat:      Mouth: Mucous membranes are moist.      Pharynx: No oropharyngeal exudate or posterior oropharyngeal erythema.   Pulmonary:      Effort: Pulmonary effort is normal.      Breath sounds: Decreased breath sounds present. No wheezing.   Skin:     General: Skin is warm and dry.      Capillary Refill: Capillary refill takes less than 2 seconds.   Neurological:      General: No focal deficit present.      Mental Status: She is alert and oriented to person, place, and " time. Mental status is at baseline.      Sensory: No sensory deficit.      Motor: No weakness.   Psychiatric:         Mood and Affect: Mood normal.         Behavior: Behavior normal.         Thought Content: Thought content normal.

## 2024-05-21 ENCOUNTER — OFFICE VISIT (OUTPATIENT)
Dept: URGENT CARE | Facility: CLINIC | Age: 64
End: 2024-05-21
Payer: COMMERCIAL

## 2024-05-21 VITALS
WEIGHT: 188 LBS | RESPIRATION RATE: 16 BRPM | TEMPERATURE: 98.3 F | HEIGHT: 67 IN | SYSTOLIC BLOOD PRESSURE: 131 MMHG | DIASTOLIC BLOOD PRESSURE: 71 MMHG | OXYGEN SATURATION: 96 % | HEART RATE: 81 BPM | BODY MASS INDEX: 29.51 KG/M2

## 2024-05-21 DIAGNOSIS — R05.1 ACUTE COUGH: Primary | ICD-10-CM

## 2024-05-21 PROCEDURE — 99213 OFFICE O/P EST LOW 20 MIN: CPT | Performed by: NURSE PRACTITIONER

## 2024-05-21 RX ORDER — ALBUTEROL SULFATE 2.5 MG/3ML
2.5 SOLUTION RESPIRATORY (INHALATION) EVERY 6 HOURS PRN
Qty: 30 ML | Refills: 0 | Status: SHIPPED | OUTPATIENT
Start: 2024-05-21

## 2024-05-21 RX ORDER — BENZONATATE 200 MG/1
200 CAPSULE ORAL 3 TIMES DAILY PRN
Qty: 20 CAPSULE | Refills: 0 | Status: SHIPPED | OUTPATIENT
Start: 2024-05-21

## 2024-05-21 NOTE — PROGRESS NOTES
[facility]        NAME: Jaida Murphy is a 63 y.o. female  : 1960    MRN: [id number]  DATE: May 21, 2024  TIME: 10:13 AM    Assessment and Plan   Acute cough [R05.1]  1. Acute cough  albuterol (2.5 mg/3 mL) 0.083 % nebulizer solution    benzonatate (TESSALON) 200 MG capsule        Albuterol for nebulizer send to pharmacy   Will trial tessalon   Recommend to call and schedule f/u with pcp as this is her second visit to urgent care with same complaint.    Pt verbalized understanding    Patient Instructions     Follow up with PCP in 3-5 days.  Proceed to  ER if symptoms worsen.    Chief Complaint     Chief Complaint   Patient presents with    Cough     Cough starting Friday - seen at [facility] Saturday. Given prednisone with no relief. States that she feels like she cannot get enough air. Has been giving herself nebs at home but states her solution is . Requesting medication for neb at home. Also c/o ear fullness.         History of Present Illness   Jaida Murphy presents to the clinic c/o    Cough (Cough starting Friday - seen at [facility] Saturday. Given prednisone with no relief. States that she feels like she cannot get enough air. Has been giving herself nebs at home but states her solution is . Requesting medication for neb at home. Also c/o ear fullness.)  Has been using robitussin for cough however not working.  States tessalon usually works for her.   Not sleeping at night because of the cough        Review of Systems   Review of Systems   All other systems reviewed and are negative.        Current Medications     Long-Term Medications   Medication Sig Dispense Refill    budesonide (PULMICORT) 90 MCG/ACT inhaler Inhale 1 puff daily      Cetirizine HCl 10 MG CAPS Take 1 capsule by mouth daily      EPINEPHrine (EPIPEN) 0.3 mg/0.3 mL SOAJ USE IN OUTTER THIGH IN CASE OF AN ALLERGIC EMERGENCY.      hydrOXYzine HCL (ATARAX) 25 mg tablet TAKE 1 TABLET BY MOUTH EVERY  "8 HOURS AS NEEDED FOR ITCHING. 270 tablet 1    levothyroxine 50 mcg tablet TAKE 1 TABLET BY MOUTH EVERY DAY 30 tablet 0    mometasone (NASONEX) 50 mcg/act nasal spray SHAKE LQ AND U 1 SPR IEN IN THE MORNING  11    Omega-3 Fatty Acids (fish oil) 1,000 mg Use      omeprazole (PriLOSEC) 10 mg delayed release capsule Take 10 mg by mouth daily      Symbicort 80-4.5 MCG/ACT inhaler USE 2 PUFFS TWICE A DAY AS DIRECTED VIA SPACER - RINSE MOUTH AFTER USE.      [DISCONTINUED] cholecalciferol (VITAMIN D3) 1,000 units tablet Take 1 tablet by mouth daily         Current Allergies     Allergies as of 05/21/2024 - Reviewed 05/21/2024   Allergen Reaction Noted    Sulfa antibiotics Anaphylaxis and Hives 07/29/2013    Cefazolin  09/23/2013    Other Hives 08/28/2023    Penicillins  07/29/2013            The following portions of the patient's history were reviewed and updated as appropriate: allergies, current medications, past family history, past medical history, past social history, past surgical history and problem list.    Objective   /71 (BP Location: Left arm, Patient Position: Sitting, Cuff Size: Standard)   Pulse 81   Temp 98.3 °F (36.8 °C) (Tympanic)   Resp 16   Ht 5' 7\" (1.702 m)   Wt 85.3 kg (188 lb)   LMP  (LMP Unknown)   SpO2 96%   BMI 29.44 kg/m²        Physical Exam     Physical Exam  Vitals and nursing note reviewed.   Constitutional:       Appearance: Normal appearance. She is well-developed.   HENT:      Head: Normocephalic and atraumatic.      Right Ear: Hearing, tympanic membrane, ear canal and external ear normal.      Left Ear: Hearing, tympanic membrane, ear canal and external ear normal.      Nose: Nose normal.      Mouth/Throat:      Lips: Pink.      Mouth: Mucous membranes are moist.      Pharynx: Oropharynx is clear.   Eyes:      General: Lids are normal.      Conjunctiva/sclera: Conjunctivae normal.      Pupils: Pupils are equal, round, and reactive to light.   Cardiovascular:      Rate and " Rhythm: Normal rate and regular rhythm.      Heart sounds: Normal heart sounds, S1 normal and S2 normal.   Pulmonary:      Effort: Pulmonary effort is normal.      Breath sounds: Wheezing present.   Abdominal:      General: Abdomen is flat. Bowel sounds are normal.      Palpations: Abdomen is soft.   Musculoskeletal:         General: Normal range of motion.      Cervical back: Full passive range of motion without pain, normal range of motion and neck supple.   Skin:     General: Skin is warm and dry.   Neurological:      General: No focal deficit present.      Mental Status: She is alert and oriented to person, place, and time.   Psychiatric:         Mood and Affect: Mood normal.         Speech: Speech normal.         Behavior: Behavior normal. Behavior is cooperative.         Thought Content: Thought content normal.         Judgment: Judgment normal.

## 2024-07-23 ENCOUNTER — OFFICE VISIT (OUTPATIENT)
Dept: FAMILY MEDICINE CLINIC | Facility: CLINIC | Age: 64
End: 2024-07-23
Payer: COMMERCIAL

## 2024-07-23 VITALS
BODY MASS INDEX: 29.76 KG/M2 | DIASTOLIC BLOOD PRESSURE: 70 MMHG | HEART RATE: 75 BPM | SYSTOLIC BLOOD PRESSURE: 100 MMHG | WEIGHT: 190 LBS | OXYGEN SATURATION: 95 % | TEMPERATURE: 97.6 F

## 2024-07-23 DIAGNOSIS — E78.01 FAMILIAL HYPERCHOLESTEROLEMIA: ICD-10-CM

## 2024-07-23 DIAGNOSIS — Z82.49 FAMILY HISTORY OF CEREBRAL ANEURYSM: ICD-10-CM

## 2024-07-23 DIAGNOSIS — Z00.00 PHYSICAL EXAM: Primary | ICD-10-CM

## 2024-07-23 DIAGNOSIS — R06.00 DYSPNEA, UNSPECIFIED TYPE: ICD-10-CM

## 2024-07-23 DIAGNOSIS — K21.9 GERD WITHOUT ESOPHAGITIS: ICD-10-CM

## 2024-07-23 DIAGNOSIS — J45.31 MILD PERSISTENT ASTHMA WITH ACUTE EXACERBATION: ICD-10-CM

## 2024-07-23 DIAGNOSIS — E03.9 HYPOTHYROIDISM, UNSPECIFIED TYPE: ICD-10-CM

## 2024-07-23 PROCEDURE — 99396 PREV VISIT EST AGE 40-64: CPT | Performed by: FAMILY MEDICINE

## 2024-07-23 NOTE — PATIENT INSTRUCTIONS
Schedule MRI/MRA head and neck to screen for aneurysm    Schedule exercise stress echo.    Avoid overexertion until results of above known    Fasting labs    Return to review above    Discuss ABUS and mammo timing with gyn    Begin to make some healthier food choices   Aim for 5 # weight loss by next visit in 1-2 mos

## 2024-07-23 NOTE — PROGRESS NOTES
FAMILY PRACTICE OFFICE VISIT    NAME: Jaida Murphy    AGE: 63 y.o.   SEX: female  : 1960   MRN: [id number]    DATE: 2024  TIME: 3:45 PM    Assessment and Plan   1. Physical exam  Anticipatory guidance and preventative medicine discussed  Colonoscopy and mammo up to date  Family h/o breast cancer    Goes to gyn- pap due next month - appt scheduled.        Goes to eye dr and dentist.    Declines shingrix for now        2. GERD without esophagitis  Stable and controlled.      3. Hypothyroidism, unspecified type  Check tsh    - TSH, 3rd generation; Future  - TSH, 3rd generation    4. Familial hypercholesterolemia  Labs.    - Comprehensive metabolic panel; Future  - Lipid panel; Future  - Comprehensive metabolic panel  - Lipid panel    5. Mild persistent asthma with acute exacerbation  Currently controlled.      6. Dyspnea, unspecified type  Will begin with workup - labs and exercise stress echo  Labs  And to consider pft's  Possibly deconditioning contributing  Does have underlying allergies- but pt feels controlled.      - Echo stress test, exercise; Future  - CBC and differential; Future  - CBC and differential    7. Family history of cerebral aneurysm  Multiple family members  Will check imaging  H/o imaging done     - MRI brain w wo mra head wo mra neck w wo; Future    If SOB severe at any point or assoc with cp - proceed to ED    Patient Instructions   Schedule MRI/MRA head and neck to screen for aneurysm    Schedule exercise stress echo.    Avoid overexertion until results of above known    Fasting labs    Return to review above    Discuss ABUS and mammo timing with gyn    Begin to make some healthier food choices   Aim for 5 # weight loss by next visit in 1-2 mos            Chief Complaint     Chief Complaint   Patient presents with    Physical Exam       History of Present Illness   Jaida Murphy is a 63 y.o.-year-old female who presents today for PE  C/o SOB with exertion  Had a  hip replaced 2021 and did not move as well after  So feels that she is less active.    Sits for job        Review of Systems   Review of Systems   Constitutional:  Positive for unexpected weight change. Negative for chills, diaphoresis and fever.        Has been trying to walk a little more  Gained up to 15#  Getting some SOB.     Eyes:  Negative for visual disturbance.   Respiratory:  Positive for shortness of breath. Negative for apnea, cough and wheezing.         CANNON - seems to be a little less pronounced since starting to walk.  Noticing with stairs (going up )  Not associated with CP or nausea  Does get some sweating  Feels that she gets overheated more easily in general.    Uses pulmicort daily  And using albuterol infrequently         Cardiovascular:  Negative for chest pain, palpitations and leg swelling.        No h/o CHF.    No personal h/o heart dz  Father had CAD - and was a smoker    Pt is a lifetime nonsmoker  But did have 2nd hand tob exposure.     Gastrointestinal:  Negative for abdominal pain, blood in stool, constipation, diarrhea, nausea and vomiting.        GERD controlled.     Genitourinary:  Negative for dysuria and menstrual problem.   Allergic/Immunologic: Positive for environmental allergies.        Allergies controlled.   Neurological:  Negative for dizziness, syncope and headaches.   Hematological:  Negative for adenopathy. Does not bruise/bleed easily.        No h/o PE or dvt     Psychiatric/Behavioral:  Negative for dysphoric mood, self-injury, sleep disturbance and suicidal ideas. The patient is not nervous/anxious.        Active Problem List     Patient Active Problem List   Diagnosis    Asthma    Fatigue    Hyperlipidemia    Hypothyroidism    Subacromial bursitis of left shoulder joint    GERD without esophagitis    S/P total hip arthroplasty    Heel pain, chronic, right    Muscle cramps    Itching         Past Medical History:  Past Medical History:   Diagnosis Date    Arthritis      BRCA1 negative     BRCA2 negative     Colon polyp     Disease of thyroid gland     History of acute sinusitis     History of dysphagia     History of headache     new onset    History of persistent cough     Pain of both hip joints        Past Surgical History:  Past Surgical History:   Procedure Laterality Date    BREAST CYST ASPIRATION      COLONOSCOPY      DILATION AND CURETTAGE OF UTERUS      EXPLORATORY LAPAROTOMY      HIP SURGERY  2016    Right side     HIP SURGERY  10/07/2021    Left side     JOINT REPLACEMENT      NASAL SEPTUM SURGERY      Nasal Septal Deviation Repair    NOSE SURGERY      Closed Treat Nasal Bone Fx w/ Manipulation Stabilization    REPAIR EXTENSOR TENDON HAND         Family History:  Family History   Problem Relation Age of Onset    Dementia Mother     Aneurysm Father         of the Left Middle Cerebral Artery    Coronary artery disease Father     Hypertension Father     Prostate cancer Father 74    Asthma Sister     Breast cancer Sister 66    Hypertension Brother     Asthma Son 19    Bipolar disorder Son         NOS    Depression Child         Emotional Depression    Ovarian cancer Maternal Grandmother 57    Lung cancer Maternal Grandfather 62    Breast cancer Maternal Aunt 80    Breast cancer Other 48    Pancreatic cancer Maternal Uncle 37    No Known Problems Maternal Aunt     No Known Problems Paternal Aunt        Social History:  Social History     Socioeconomic History    Marital status: /Civil Union     Spouse name: Not on file    Number of children: Not on file    Years of education: Not on file    Highest education level: Not on file   Occupational History    Not on file   Tobacco Use    Smoking status: Former     Current packs/day: 0.00     Types: Cigarettes     Quit date: 1978     Years since quittin.8    Smokeless tobacco: Never   Vaping Use    Vaping status: Never Used   Substance and Sexual Activity    Alcohol use: Yes     Alcohol/week: 3.0 standard  drinks of alcohol     Types: 3 Standard drinks or equivalent per week     Comment: socially drinks 1 time per week    Drug use: Never    Sexual activity: Yes     Partners: Male     Birth control/protection: Post-menopausal   Other Topics Concern    Not on file   Social History Narrative    Caffeine use: 1 20 oz of coffee     Social Determinants of Health     Financial Resource Strain: Not on file   Food Insecurity: Not on file   Transportation Needs: Not on file   Physical Activity: Not on file   Stress: Not on file   Social Connections: Not on file   Intimate Partner Violence: Not on file   Housing Stability: Not on file       Objective     Vitals:    07/23/24 1500   BP: 100/70   Pulse: 75   Temp: 97.6 °F (36.4 °C)   SpO2: 95%     Wt Readings from Last 3 Encounters:   07/23/24 86.2 kg (190 lb)   05/21/24 85.3 kg (188 lb)   05/18/24 82.6 kg (182 lb)       Physical Exam  Vitals and nursing note reviewed.   Constitutional:       General: She is not in acute distress.     Appearance: Normal appearance. She is not ill-appearing or toxic-appearing.      Comments: Appears younger than stated age     HENT:      Right Ear: Tympanic membrane normal.      Left Ear: Tympanic membrane normal.      Nose: Nose normal. No congestion or rhinorrhea.      Mouth/Throat:      Mouth: Mucous membranes are moist.      Pharynx: No oropharyngeal exudate or posterior oropharyngeal erythema.   Eyes:      General: No scleral icterus.  Neck:      Vascular: No carotid bruit.   Cardiovascular:      Rate and Rhythm: Normal rate and regular rhythm.      Pulses: Normal pulses.      Heart sounds: Normal heart sounds. No murmur heard.  Pulmonary:      Effort: Pulmonary effort is normal. No respiratory distress.      Breath sounds: Normal breath sounds. No wheezing, rhonchi or rales.   Abdominal:      General: There is no distension.      Palpations: Abdomen is soft. There is no mass.      Tenderness: There is no abdominal tenderness. There is no  "guarding or rebound.   Musculoskeletal:      Cervical back: Neck supple. No tenderness.      Right lower leg: No edema.      Left lower leg: No edema.   Lymphadenopathy:      Cervical: No cervical adenopathy.   Skin:     General: Skin is warm.      Coloration: Skin is not jaundiced.   Neurological:      General: No focal deficit present.      Mental Status: She is alert and oriented to person, place, and time.   Psychiatric:         Mood and Affect: Mood normal.         Behavior: Behavior normal.         Thought Content: Thought content normal.         Judgment: Judgment normal.         Pertinent Laboratory/Diagnostic Studies:  Lab Results   Component Value Date    BUN 12 12/27/2022    CREATININE 0.79 12/27/2022    CALCIUM 9.3 12/27/2022    K 4.5 12/27/2022    CO2 30 12/27/2022     12/27/2022     Lab Results   Component Value Date    ALT 46 12/27/2022    AST 26 12/27/2022    ALKPHOS 90 12/27/2022       Lab Results   Component Value Date    WBC 5.60 12/27/2022    HGB 13.6 12/27/2022    HCT 42.7 12/27/2022    MCV 92 12/27/2022     12/27/2022       Lab Results   Component Value Date    TSH 1.60 05/04/2022       No results found for: \"CHOL\"  Lab Results   Component Value Date    TRIG 73 12/27/2022     Lab Results   Component Value Date    HDL 68 12/27/2022     Lab Results   Component Value Date    LDLCALC 163 (H) 12/27/2022     Lab Results   Component Value Date    HGBA1C 5.5 12/27/2022       Results for orders placed or performed during the hospital encounter of 04/20/23   Tissue Exam   Result Value Ref Range    Case Report       Surgical Pathology Report                         Case: [id number]                                   Authorizing Provider:  Ruel Navas MD              Collected:           04/20/2023 1045              Ordering Location:     [facility]        Received:            04/21/2023 [address] [place] [address] Endoscopy                                     "                 Pathologist:           Veronica Pillai MD                                                                  Specimens:   A) - Polyp, Colorectal, sigmoid colon polyp/hot snare                                               B) - Polyp, Colorectal, transverse colon polyp/cold forceps                                         C) - Polyp, Colorectal, sigmoid colon polyps x 2/cold forceps                              Final Diagnosis       A. Colon, Sigmoid colon polyp, Polypectomy:  - Tubular adenoma.  - Negative for high grade dysplasia.    B. Colon, Transverse colon polyp, Polypectomy:  - Sessile serrated polyp/adenoma.     C. Colon, Sigmoid colon polyps x2, Polypectomy:  - Hyperplastic polyps.  - Negative for dysplasia.       Additional Information       All reported additional testing was performed with appropriately reactive controls.  These tests were developed and their performance characteristics determined by [facility] or appropriate performing facility, though some tests may be performed on tissues which have not been validated for performance characteristics (such as staining performed on alcohol exposed cell blocks and decalcified tissues).  Results should be interpreted with caution and in the context of the patients’ clinical condition. These tests may not be cleared or approved by the U.S. Food and Drug Administration, though the FDA has determined that such clearance or approval is not necessary. These tests are used for clinical purposes and they should not be regarded as investigational or for research. This laboratory has been approved by CLIA 88, designated as a high-complexity laboratory and is qualified to perform these tests.    Interpretation performed at [facility], [address] [address]       Synoptic Checklist          COLON/RECTUM POLYP FORM - GI - All Specimens          :    Adenoma(s)       :    Serrated Adenoma      Gross  "Description       A. The specimen is received in formalin, labeled with the patient's name and hospital number, and is designated \" sigmoid colon polyp hot snare.\"  It consists of a 1.0 x 0.6 x 0.2 cm aggregate of tan and rubbery tissue fragments.  The specimen is submitted in toto in screened cassette A1.  B. The specimen is received in formalin, labeled with the patient's name and hospital number, and is designated \" transverse colon polyp cold forceps.\"  It consists of 3 tan tissue fragments that measure 0.2 cm and 0.3 cm in greatest dimension.  The specimen is submitted in toto in screened cassette B1.  C. The specimen is received in formalin, labeled with the patient's name and hospital number, and is designated \" sigmoid colon polyps x2 cold forceps.\"  It consists of 2 tan and rubbery tissue fragments each measuring 0.3 cm in greatest dimension. The specimen is submitted in toto in screened cassette C1.    Note: The estimated total formalin fixation time based upon information provided by the submitting clinician and the standard processing schedule is under 72 hours. [email or web address]         No orders of the defined types were placed in this encounter.      ALLERGIES:  Allergies   Allergen Reactions    Sulfa Antibiotics Anaphylaxis and Hives     HIVES    Cefazolin      Ancef another name for cefazolin    Other Hives    Penicillins        Current Medications     Current Outpatient Medications   Medication Sig Dispense Refill    albuterol (2.5 mg/3 mL) 0.083 % nebulizer solution Take 3 mL (2.5 mg total) by nebulization every 6 (six) hours as needed for wheezing or shortness of breath 30 mL 0    benzonatate (TESSALON) 200 MG capsule Take 1 capsule (200 mg total) by mouth 3 (three) times a day as needed for cough 20 capsule 0    budesonide (PULMICORT) 90 MCG/ACT inhaler Inhale 1 puff daily      Cetirizine HCl 10 MG CAPS Take 1 capsule by mouth daily      Cholecalciferol (VITAMIN D PO) Take 5,000 Units by mouth daily      " levothyroxine 50 mcg tablet TAKE 1 TABLET BY MOUTH EVERY DAY 30 tablet 0    Lutein 10 MG TABS Take 5 mg by mouth daily      mometasone (NASONEX) 50 mcg/act nasal spray SHAKE LQ AND U 1 SPR IEN IN THE MORNING  11    Omega-3 Fatty Acids (fish oil) 1,000 mg Use      omeprazole (PriLOSEC) 10 mg delayed release capsule Take 10 mg by mouth daily      Symbicort 80-4.5 MCG/ACT inhaler USE 2 PUFFS TWICE A DAY AS DIRECTED VIA SPACER - RINSE MOUTH AFTER USE.      clindamycin (CLEOCIN) 150 mg capsule       EPINEPHrine (EPIPEN) 0.3 mg/0.3 mL SOAJ USE IN OUTTER THIGH IN CASE OF AN ALLERGIC EMERGENCY.      hydrOXYzine HCL (ATARAX) 25 mg tablet TAKE 1 TABLET BY MOUTH EVERY 8 HOURS AS NEEDED FOR ITCHING. 270 tablet 1     No current facility-administered medications for this visit.         Health Maintenance     Health Maintenance   Topic Date Due    HIV Screening  Never done    Osteoporosis Screening  Never done    Zoster Vaccine (1 of 2) Never done    PT PLAN OF CARE  01/01/2021    RSV Vaccine Age 60+ Years (1 - 1-dose 60+ series) Never done    COVID-19 Vaccine (5 - 2023-24 season) 09/01/2023    Annual Physical  03/28/2024    Cervical Cancer Screening  05/28/2024    Breast Cancer Screening: Mammogram  08/29/2024    Influenza Vaccine (1) 09/01/2024    Pneumococcal Vaccine: Pediatrics (0 to 5 Years) and At-Risk Patients (6 to 64 Years) (1 of 2 - PCV) 08/28/2024 (Originally 7/27/1966)    Depression Screening  05/21/2025    Colorectal Cancer Screening  04/19/2026    DTaP,Tdap,and Td Vaccines (2 - Td or Tdap) 09/18/2029    Hepatitis C Screening  Completed    RSV Vaccine age 0-20 Months  Aged Out    HIB Vaccine  Aged Out    IPV Vaccine  Aged Out    Hepatitis A Vaccine  Aged Out    Meningococcal ACWY Vaccine  Aged Out    HPV Vaccine  Aged Out     Immunization History   Administered Date(s) Administered    COVID-19 PFIZER VACCINE 0.3 ML IM 03/20/2021, 04/10/2021, 12/04/2021, 06/08/2022    INFLUENZA 10/18/2019, 10/01/2020    Influenza,  recombinant, quadrivalent,injectable, preservative free 10/18/2019, 10/01/2020    Td (adult), adsorbed 07/29/2005    Tdap 09/18/2019       Lung Cancer Screening Shared Decision Making: I discussed with her that she is a candidate for lung cancer CT screening.     The following Shared Decision-Making points were covered:  Benefits of screening were discussed, including the rates of reduction in death from lung cancer and other causes.  Harms of screening were reviewed, including false positive tests, radiation exposure levels, risks of invasive procedures, risks of complications of screening, and risk of overdiagnosis.  I counseled on the importance of adherence to annual lung cancer LDCT screening, impact of co-morbidities, and ability or willingness to undergo diagnosis and treatment.  I counseled on the importance of maintaining abstinence as a former smoker or was counseled on the importance of smoking cessation if a current smoker    Review of Eligibility Criteria: She meets all of the criteria for Lung Cancer Screening.   - She is 63 y.o.   - She has 20 pack year tobacco history and is a current smoker or has quit within the past 15 years  - She presents no signs or symptoms of lung cancer    After discussion, the patient decided to elect lung cancer screening.        Jenniffer Caro DO

## 2024-07-26 ENCOUNTER — APPOINTMENT (OUTPATIENT)
Dept: LAB | Facility: CLINIC | Age: 64
End: 2024-07-26
Payer: COMMERCIAL

## 2024-07-26 LAB
ALBUMIN SERPL BCG-MCNC: 4.1 G/DL (ref 3.5–5)
ALP SERPL-CCNC: 73 U/L (ref 34–104)
ALT SERPL W P-5'-P-CCNC: 29 U/L (ref 7–52)
ANION GAP SERPL CALCULATED.3IONS-SCNC: 12 MMOL/L (ref 4–13)
AST SERPL W P-5'-P-CCNC: 19 U/L (ref 13–39)
BASOPHILS # BLD AUTO: 0.06 THOUSANDS/ÂΜL (ref 0–0.1)
BASOPHILS NFR BLD AUTO: 1 % (ref 0–1)
BILIRUB SERPL-MCNC: 0.89 MG/DL (ref 0.2–1)
BUN SERPL-MCNC: 15 MG/DL (ref 5–25)
CALCIUM SERPL-MCNC: 8.9 MG/DL (ref 8.4–10.2)
CHLORIDE SERPL-SCNC: 103 MMOL/L (ref 96–108)
CHOLEST SERPL-MCNC: 206 MG/DL
CO2 SERPL-SCNC: 25 MMOL/L (ref 21–32)
CREAT SERPL-MCNC: 0.72 MG/DL (ref 0.6–1.3)
EOSINOPHIL # BLD AUTO: 0.21 THOUSAND/ÂΜL (ref 0–0.61)
EOSINOPHIL NFR BLD AUTO: 3 % (ref 0–6)
ERYTHROCYTE [DISTWIDTH] IN BLOOD BY AUTOMATED COUNT: 13 % (ref 11.6–15.1)
GFR SERPL CREATININE-BSD FRML MDRD: 90 ML/MIN/1.73SQ M
GLUCOSE P FAST SERPL-MCNC: 102 MG/DL (ref 65–99)
HCT VFR BLD AUTO: 40.4 % (ref 34.8–46.1)
HDLC SERPL-MCNC: 57 MG/DL
HGB BLD-MCNC: 13 G/DL (ref 11.5–15.4)
IMM GRANULOCYTES # BLD AUTO: 0.02 THOUSAND/UL (ref 0–0.2)
IMM GRANULOCYTES NFR BLD AUTO: 0 % (ref 0–2)
LDLC SERPL CALC-MCNC: 133 MG/DL (ref 0–100)
LYMPHOCYTES # BLD AUTO: 2.04 THOUSANDS/ÂΜL (ref 0.6–4.47)
LYMPHOCYTES NFR BLD AUTO: 30 % (ref 14–44)
MCH RBC QN AUTO: 29.7 PG (ref 26.8–34.3)
MCHC RBC AUTO-ENTMCNC: 32.2 G/DL (ref 31.4–37.4)
MCV RBC AUTO: 92 FL (ref 82–98)
MONOCYTES # BLD AUTO: 0.47 THOUSAND/ÂΜL (ref 0.17–1.22)
MONOCYTES NFR BLD AUTO: 7 % (ref 4–12)
NEUTROPHILS # BLD AUTO: 4.1 THOUSANDS/ÂΜL (ref 1.85–7.62)
NEUTS SEG NFR BLD AUTO: 59 % (ref 43–75)
NONHDLC SERPL-MCNC: 149 MG/DL
NRBC BLD AUTO-RTO: 0 /100 WBCS
PLATELET # BLD AUTO: 302 THOUSANDS/UL (ref 149–390)
PMV BLD AUTO: 10.2 FL (ref 8.9–12.7)
POTASSIUM SERPL-SCNC: 3.9 MMOL/L (ref 3.5–5.3)
PROT SERPL-MCNC: 6.8 G/DL (ref 6.4–8.4)
RBC # BLD AUTO: 4.37 MILLION/UL (ref 3.81–5.12)
SODIUM SERPL-SCNC: 140 MMOL/L (ref 135–147)
TRIGL SERPL-MCNC: 82 MG/DL
TSH SERPL DL<=0.05 MIU/L-ACNC: 2.46 UIU/ML (ref 0.45–4.5)
WBC # BLD AUTO: 6.9 THOUSAND/UL (ref 4.31–10.16)

## 2024-07-26 PROCEDURE — 80061 LIPID PANEL: CPT | Performed by: FAMILY MEDICINE

## 2024-07-26 PROCEDURE — 85025 COMPLETE CBC W/AUTO DIFF WBC: CPT | Performed by: FAMILY MEDICINE

## 2024-07-26 PROCEDURE — 80053 COMPREHEN METABOLIC PANEL: CPT | Performed by: FAMILY MEDICINE

## 2024-07-26 PROCEDURE — 36415 COLL VENOUS BLD VENIPUNCTURE: CPT | Performed by: FAMILY MEDICINE

## 2024-07-26 PROCEDURE — 84443 ASSAY THYROID STIM HORMONE: CPT | Performed by: FAMILY MEDICINE

## 2024-07-27 NOTE — RESULT ENCOUNTER NOTE
Good morning  Labs look stable overall with great improvement in lipids.  Have a great weekend!  Dr yip.

## 2024-08-01 ENCOUNTER — TELEPHONE (OUTPATIENT)
Age: 64
End: 2024-08-01

## 2024-08-01 DIAGNOSIS — T78.2XXS ANAPHYLAXIS, SEQUELA: Primary | ICD-10-CM

## 2024-08-01 RX ORDER — EPINEPHRINE 0.3 MG/.3ML
INJECTION SUBCUTANEOUS
Qty: 2 EACH | Refills: 1 | Status: SHIPPED | OUTPATIENT
Start: 2024-08-01

## 2024-08-01 NOTE — TELEPHONE ENCOUNTER
MRI Brain scheduled 8/17/2024 suggested she request a Rx: Epi-Pen    Pharmacy [facility] [address]    Please review

## 2024-08-07 DIAGNOSIS — E03.9 HYPOTHYROIDISM, UNSPECIFIED TYPE: ICD-10-CM

## 2024-08-07 RX ORDER — LEVOTHYROXINE SODIUM 0.05 MG/1
50 TABLET ORAL DAILY
Qty: 100 TABLET | Refills: 1 | Status: SHIPPED | OUTPATIENT
Start: 2024-08-07

## 2024-08-13 ENCOUNTER — ANNUAL EXAM (OUTPATIENT)
Dept: OBGYN CLINIC | Facility: MEDICAL CENTER | Age: 64
End: 2024-08-13
Payer: COMMERCIAL

## 2024-08-13 ENCOUNTER — TELEPHONE (OUTPATIENT)
Age: 64
End: 2024-08-13

## 2024-08-13 VITALS
SYSTOLIC BLOOD PRESSURE: 98 MMHG | BODY MASS INDEX: 29.41 KG/M2 | WEIGHT: 187.4 LBS | HEIGHT: 67 IN | DIASTOLIC BLOOD PRESSURE: 68 MMHG

## 2024-08-13 DIAGNOSIS — Z12.11 COLON CANCER SCREENING: ICD-10-CM

## 2024-08-13 DIAGNOSIS — Z91.89 INCREASED RISK OF BREAST CANCER: ICD-10-CM

## 2024-08-13 DIAGNOSIS — Z12.31 BREAST CANCER SCREENING BY MAMMOGRAM: ICD-10-CM

## 2024-08-13 DIAGNOSIS — Z01.419 WELL WOMAN EXAM WITH ROUTINE GYNECOLOGICAL EXAM: Primary | ICD-10-CM

## 2024-08-13 PROCEDURE — G0145 SCR C/V CYTO,THINLAYER,RESCR: HCPCS | Performed by: OBSTETRICS & GYNECOLOGY

## 2024-08-13 PROCEDURE — G0476 HPV COMBO ASSAY CA SCREEN: HCPCS | Performed by: OBSTETRICS & GYNECOLOGY

## 2024-08-13 PROCEDURE — S0612 ANNUAL GYNECOLOGICAL EXAMINA: HCPCS | Performed by: OBSTETRICS & GYNECOLOGY

## 2024-08-13 NOTE — PROGRESS NOTES
Assessment   64 y.o. postmenopausal female presenting for annual exam.     Plan   Diagnoses and all orders for this visit:    Well woman exam with routine gynecological exam  -     Liquid-based pap, screening  -     HPV High Risk  - Mammo/ABUS testing current; next ordered  - Colonoscopy up to date  - Return in 1yr for yearly    Breast cancer screening by mammogram  Increased risk of breast cancer  -     Mammo screening bilateral w 3d & cad; Future  - ABUS up to date    Colon cancer screening  - Up to date    __________________________________________________________________      Subjective     64 y.o. postmenopausal female who is sexually active presenting for annual exam. She is without complaint.      GYN  Complaints: denies  Denies dyspareunia, genital discharge, genital ulcers, pelvic pain and vulvar/vaginal symptoms  Menopause occurred at age approx 56. She has had no bleeding since this time.  Menopausal symptoms: hot flashes intermittently  Sexually active: Yes - single partner - male  Hx STI: denies   Hx Abnormal pap: denies  Last pap:  - NILM     OB   ( x1, SAB x1)  Pregnancy complications: denies       Complaints: denies  Denies urinary frequency, hematuria, urinary incontinence and dysuria     BREAST  Complaints: denies  Denies: breast lump, breast tenderness, changed mole, dryness, nipple discharge, pruritus, rash, skin color change and skin lesion(s)  Last mammogram:  - birads 2  Personal hx: breast cyst  Family hx: denies fhx of uterine or colon cancers  Sister was 65, aunt 75 with breast ca  Niece as well (daughter of her sister with Ca) with breast ca (48)  Ovarian cancer in maternal grandmother (53yo).   Personal History: Hormone history includes birth control. Surgical history includes breast cyst aspiration. Medical history includes BRCA 1 negative and BRCA 2 negative.  Patient does do regular self-exams     GENERAL  PMH reviewed/updated and is as below.   Patient does follow  with a PCP.  Works at [employer], [employer], admin support.   Retired from [employer] 37yr  Denies domestic violence.  Exercise: treadmill  Diet: nothing specific     SCREENING  Cervical Ca: pap collected  Breast Ca: mammo ordered; ABUS up to date   Colon Ca: 2023 - colonoscopy - repeat 3yr      Past Medical History:   Diagnosis Date    Arthritis     BRCA1 negative     BRCA2 negative     Colon polyp     Disease of thyroid gland     History of acute sinusitis     History of dysphagia     History of headache     new onset    History of persistent cough     Pain of both hip joints        Past Surgical History:   Procedure Laterality Date    BREAST CYST ASPIRATION  2006    COLONOSCOPY      DILATION AND CURETTAGE OF UTERUS      EXPLORATORY LAPAROTOMY      HIP SURGERY  05/03/2016    Right side     HIP SURGERY  10/07/2021    Left side     JOINT REPLACEMENT      NASAL SEPTUM SURGERY      Nasal Septal Deviation Repair    NOSE SURGERY      Closed Treat Nasal Bone Fx w/ Manipulation Stabilization    REPAIR EXTENSOR TENDON HAND           Current Outpatient Medications:     albuterol (2.5 mg/3 mL) 0.083 % nebulizer solution, Take 3 mL (2.5 mg total) by nebulization every 6 (six) hours as needed for wheezing or shortness of breath, Disp: 30 mL, Rfl: 0    benzonatate (TESSALON) 200 MG capsule, Take 1 capsule (200 mg total) by mouth 3 (three) times a day as needed for cough, Disp: 20 capsule, Rfl: 0    budesonide (PULMICORT) 90 MCG/ACT inhaler, Inhale 1 puff daily, Disp: , Rfl:     Cetirizine HCl 10 MG CAPS, Take 1 capsule by mouth daily, Disp: , Rfl:     Cholecalciferol (VITAMIN D PO), Take 5,000 Units by mouth daily, Disp: , Rfl:     clindamycin (CLEOCIN) 150 mg capsule, , Disp: , Rfl:     EPINEPHrine (EPIPEN) 0.3 mg/0.3 mL SOAJ, Inject 0.3 mg into outer thigh prn anaphylaxis, Disp: 2 each, Rfl: 1    hydrOXYzine HCL (ATARAX) 25 mg tablet, TAKE 1 TABLET BY MOUTH EVERY 8 HOURS AS NEEDED FOR ITCHING., Disp: 270 tablet, Rfl:  "1    levothyroxine 50 mcg tablet, Take 1 tablet (50 mcg total) by mouth daily, Disp: 100 tablet, Rfl: 1    Lutein 10 MG TABS, Take 5 mg by mouth daily, Disp: , Rfl:     mometasone (NASONEX) 50 mcg/act nasal spray, SHAKE LQ AND U 1 SPR IEN IN THE MORNING, Disp: , Rfl: 11    Omega-3 Fatty Acids (fish oil) 1,000 mg, Use, Disp: , Rfl:     omeprazole (PriLOSEC) 10 mg delayed release capsule, Take 10 mg by mouth daily, Disp: , Rfl:     Symbicort 80-4.5 MCG/ACT inhaler, USE 2 PUFFS TWICE A DAY AS DIRECTED VIA SPACER - RINSE MOUTH AFTER USE., Disp: , Rfl:     Allergies   Allergen Reactions    Sulfa Antibiotics Anaphylaxis and Hives     HIVES    Cefazolin      Ancef another name for cefazolin    Other Hives    Penicillins        Social History     Tobacco Use    Smoking status: Former     Current packs/day: 0.00     Types: Cigarettes     Quit date: 1978     Years since quittin.9    Smokeless tobacco: Never   Vaping Use    Vaping status: Never Used   Substance Use Topics    Alcohol use: Yes     Alcohol/week: 3.0 standard drinks of alcohol     Types: 3 Standard drinks or equivalent per week     Comment: socially drinks 1 time per week    Drug use: Never             Objective  BP 98/68   Ht 5' 7\" (1.702 m)   Wt 85 kg (187 lb 6.4 oz)   LMP  (LMP Unknown)   BMI 29.35 kg/m²      Physical Exam:  Physical Exam  Exam conducted with a chaperone present.   Constitutional:       General: She is not in acute distress.     Appearance: Normal appearance. She is well-developed. She is not ill-appearing, toxic-appearing or diaphoretic.   HENT:      Head: Normocephalic and atraumatic.   Eyes:      General: No scleral icterus.        Right eye: No discharge.         Left eye: No discharge.      Conjunctiva/sclera: Conjunctivae normal.   Cardiovascular:      Rate and Rhythm: Normal rate.   Pulmonary:      Effort: Pulmonary effort is normal. No accessory muscle usage or respiratory distress.   Chest:   Breasts:     Breasts are " symmetrical.      Right: No inverted nipple, mass, nipple discharge, skin change or tenderness.      Left: No inverted nipple, mass, nipple discharge, skin change or tenderness.   Abdominal:      General: There is no distension.      Palpations: Abdomen is soft. There is no mass.      Tenderness: There is no abdominal tenderness. There is no guarding or rebound.   Genitourinary:     General: Normal vulva.      Exam position: Lithotomy position.      Labia:         Right: No rash, tenderness or lesion.         Left: No rash, tenderness or lesion.       Urethra: No prolapse, urethral swelling or urethral lesion.      Vagina: No signs of injury. No vaginal discharge, erythema, tenderness, bleeding or lesions.      Cervix: No cervical motion tenderness, discharge, friability, lesion or erythema.      Uterus: Not enlarged, not fixed and not tender.       Adnexa:         Right: No mass, tenderness or fullness.          Left: No mass, tenderness or fullness.        Rectum: No external hemorrhoid. Normal anal tone.   Lymphadenopathy:      Upper Body:      Right upper body: No axillary or pectoral adenopathy.      Left upper body: No axillary or pectoral adenopathy.   Skin:     General: Skin is warm and dry.      Findings: No erythema or rash.   Neurological:      Mental Status: She is alert.   Psychiatric:         Mood and Affect: Mood normal.         Behavior: Behavior normal.         Thought Content: Thought content normal.         Judgment: Judgment normal.

## 2024-08-13 NOTE — TELEPHONE ENCOUNTER
PA for EPI-PEN SUBMITTED     via    []CMM-KEY:   [x]Surescripts-Case ID # [id number]  []Faxed to plan   []Other website   []Phone call Case ID #     Office notes sent, clinical questions answered. Awaiting determination    Turnaround time for your insurance to make a decision on your Prior Authorization can take 7-21 business days.

## 2024-08-14 NOTE — TELEPHONE ENCOUNTER
PA for Epi Pen not required     Reason (screenshot if applicable)    Pharmacy using wrong NDC code, spoke with pharmacist who switched NDC and it is a $15 copay for patient.           Pharmacy advised by    []Fax  [x]Phone call

## 2024-08-16 ENCOUNTER — TELEPHONE (OUTPATIENT)
Dept: FAMILY MEDICINE CLINIC | Facility: CLINIC | Age: 64
End: 2024-08-16

## 2024-08-16 DIAGNOSIS — Z82.49 FAMILY HISTORY OF CEREBRAL ANEURYSM: Primary | ICD-10-CM

## 2024-08-16 NOTE — TELEPHONE ENCOUNTER
Arti from [employer] called and stated tat insurance is allowing pt to have MRA head wo contrast. I did place that order for you today for the pt.    I also called the pt and l/m to advise her what was approved. If she had any questions she is to call us.

## 2024-08-17 ENCOUNTER — HOSPITAL ENCOUNTER (OUTPATIENT)
Facility: MEDICAL CENTER | Age: 64
Discharge: HOME | End: 2024-08-17
Payer: COMMERCIAL

## 2024-08-17 DIAGNOSIS — Z82.49 FAMILY HISTORY OF CEREBRAL ANEURYSM: ICD-10-CM

## 2024-08-17 PROCEDURE — 70544 MR ANGIOGRAPHY HEAD W/O DYE: CPT

## 2024-08-22 LAB
LAB AP GYN PRIMARY INTERPRETATION: NORMAL
Lab: NORMAL

## 2024-09-04 ENCOUNTER — HOSPITAL ENCOUNTER (OUTPATIENT)
Dept: NON INVASIVE DIAGNOSTICS | Facility: HOSPITAL | Age: 64
Discharge: HOME | End: 2024-09-04
Attending: FAMILY MEDICINE
Payer: COMMERCIAL

## 2024-09-04 VITALS — BODY MASS INDEX: 29.35 KG/M2 | HEIGHT: 67 IN | WEIGHT: 187 LBS

## 2024-09-04 DIAGNOSIS — R06.00 DYSPNEA, UNSPECIFIED TYPE: ICD-10-CM

## 2024-09-04 LAB
AORTIC ROOT: 3.3 CM
ASCENDING AORTA: 3 CM
CHEST PAIN STATEMENT: NORMAL
E WAVE DECELERATION TIME: 194 MS
E/A RATIO: 0.83
FRACTIONAL SHORTENING: 14 (ref 28–44)
INTERVENTRICULAR SEPTUM IN DIASTOLE (PARASTERNAL SHORT AXIS VIEW): 0.9 CM
INTERVENTRICULAR SEPTUM: 0.9 CM (ref 0.6–1.1)
LAAS-AP4: 10.6 CM2
LEFT ATRIUM SIZE: 3.1 CM
LEFT INTERNAL DIMENSION IN SYSTOLE: 4.2 CM (ref 2.1–4)
LEFT VENTRICULAR INTERNAL DIMENSION IN DIASTOLE: 4.9 CM (ref 3.5–6)
LEFT VENTRICULAR POSTERIOR WALL IN END DIASTOLE: 0.8 CM
LEFT VENTRICULAR STROKE VOLUME: 31 ML
LVSV (TEICH): 31 ML
MAX DIASTOLIC BP: 86 MMHG
MAX HR PERCENT: 94 %
MAX HR: 148 BPM
MAX PREDICTED HEART RATE: 157 BPM
MV E'TISSUE VEL-LAT: 9 CM/S
MV E'TISSUE VEL-SEP: 7 CM/S
MV PEAK A VEL: 0.76 M/S
MV PEAK E VEL: 63 CM/S
MV STENOSIS PRESSURE HALF TIME: 56 MS
MV VALVE AREA P 1/2 METHOD: 3.93
PROTOCOL NAME: NORMAL
RATE PRESSURE PRODUCT: NORMAL
RIGHT ATRIUM AREA SYSTOLE A4C: 15.2 CM2
RIGHT VENTRICLE ID DIMENSION: 2.9 CM
SL CV LV EF: 55
SL CV PED ECHO LEFT VENTRICLE DIASTOLIC VOLUME (MOD BIPLANE) 2D: 112 ML
SL CV PED ECHO LEFT VENTRICLE SYSTOLIC VOLUME (MOD BIPLANE) 2D: 80 ML
SL CV STRESS RECOVERY BP: NORMAL MMHG
SL CV STRESS RECOVERY HR: 81 BPM
SL CV STRESS RECOVERY O2 SAT: 98 %
SL CV STRESS STAGE REACHED: 2
STRESS ANGINA INDEX: 0
STRESS BASELINE BP: NORMAL MMHG
STRESS BASELINE HR: 75 BPM
STRESS O2 SAT REST: 96 %
STRESS PEAK HR: 148 BPM
STRESS POST ESTIMATED WORKLOAD: 6.4 METS
STRESS POST EXERCISE DUR MIN: 4 MIN
STRESS POST EXERCISE DUR MIN: 4 MIN
STRESS POST EXERCISE DUR SEC: 31 SEC
STRESS POST EXERCISE DUR SEC: 31 SEC
STRESS POST O2 SAT PEAK: 98 %
STRESS POST PEAK BP: 190 MMHG
STRESS POST PEAK HR: 148 BPM
STRESS POST PEAK SYSTOLIC BP: 190 MMHG
TARGET HR FORMULA: NORMAL
TEST INDICATION: NORMAL
TR MAX PG: 20 MMHG
TR PEAK VELOCITY: 2.2 M/S
TRICUSPID ANNULAR PLANE SYSTOLIC EXCURSION: 2.4 CM
TRICUSPID VALVE PEAK REGURGITATION VELOCITY: 2.21 M/S

## 2024-09-04 PROCEDURE — 93350 STRESS TTE ONLY: CPT

## 2024-09-04 PROCEDURE — 93350 STRESS TTE ONLY: CPT | Performed by: INTERNAL MEDICINE

## 2024-09-17 ENCOUNTER — RA CDI HCC (OUTPATIENT)
Dept: OTHER | Facility: HOSPITAL | Age: 64
End: 2024-09-17

## 2024-09-24 ENCOUNTER — OFFICE VISIT (OUTPATIENT)
Dept: FAMILY MEDICINE CLINIC | Facility: CLINIC | Age: 64
End: 2024-09-24
Payer: COMMERCIAL

## 2024-09-24 VITALS
WEIGHT: 184.4 LBS | RESPIRATION RATE: 16 BRPM | HEART RATE: 73 BPM | BODY MASS INDEX: 28.88 KG/M2 | SYSTOLIC BLOOD PRESSURE: 116 MMHG | OXYGEN SATURATION: 96 % | DIASTOLIC BLOOD PRESSURE: 82 MMHG

## 2024-09-24 DIAGNOSIS — E78.01 FAMILIAL HYPERCHOLESTEROLEMIA: ICD-10-CM

## 2024-09-24 DIAGNOSIS — R93.1 ABNORMAL ECHOCARDIOGRAM: ICD-10-CM

## 2024-09-24 DIAGNOSIS — E03.9 HYPOTHYROIDISM, UNSPECIFIED TYPE: ICD-10-CM

## 2024-09-24 DIAGNOSIS — Z82.49 FAMILY HISTORY OF BRAIN ANEURYSM: Primary | ICD-10-CM

## 2024-09-24 DIAGNOSIS — J45.31 MILD PERSISTENT ASTHMA WITH ACUTE EXACERBATION: ICD-10-CM

## 2024-09-24 PROBLEM — R25.2 MUSCLE CRAMPS: Status: RESOLVED | Noted: 2022-12-27 | Resolved: 2024-09-24

## 2024-09-24 PROCEDURE — 99214 OFFICE O/P EST MOD 30 MIN: CPT | Performed by: FAMILY MEDICINE

## 2024-09-24 NOTE — PROGRESS NOTES
FAMILY PRACTICE OFFICE VISIT    NAME: Jaida Murphy    AGE: 64 y.o.   SEX: female  : 1960   MRN: [id number]    DATE: 2024  TIME: 8:53 AM    Assessment and Plan   1. Family history of brain aneurysm  MRA head/neck 2024 - (-) for aneurysm      2. Mild persistent asthma with acute exacerbation  Stable  Using pulmicort once daily    If SOB returns - will check CXR  Pt currently without symptoms        3. Hypothyroidism, unspecified type  Stable tsh on recent labs      4. Familial hypercholesterolemia  Overall improvement in recent lipids  Suspect exercise is greatly helping      GOAL - to lose another 19#  Trying to do so in 5 # increments by being more active and eating healhier.    Exercise stress echo - 2024 - consistent with mild thickening of the aortic leaflets  To consider repeating in 2 years  EF 55%  Results consistent with deconditioning    Patient Instructions   Consider flu shot next month      Chief Complaint     Chief Complaint   Patient presents with    Follow-up     Pt is feeling good overall here to follow up on MRI and echo testing       History of Present Illness   Jaida Murphy is a 64 y.o.-year-old female who presents today in followup to 2024 visit  At which time pt was c/o dyspnea  Had workup so far with echo  And labs      Pt noticing that with adding some physical activity - ie: walking  Her SOB is gone    And also had MRA head/neck due to family h/o brain aneurysms and were (-)        Review of Systems   Review of Systems   Constitutional:         Is now walking on a regular basis and noticing that she feels better overall  More energy    Also lost a few #       Respiratory:  Negative for cough, shortness of breath and wheezing.         Has not had to use the albuterol  Using pulmicort daily in the am         Active Problem List     Patient Active Problem List   Diagnosis    Asthma    Fatigue    Hyperlipidemia    Hypothyroidism    Subacromial bursitis of left  shoulder joint    GERD without esophagitis    S/P total hip arthroplasty    Heel pain, chronic, right    Muscle cramps    Itching         Past Medical History:  Past Medical History:   Diagnosis Date    Arthritis     BRCA1 negative     BRCA2 negative     Colon polyp     Disease of thyroid gland     History of acute sinusitis     History of dysphagia     History of headache     new onset    History of persistent cough     Pain of both hip joints        Past Surgical History:  Past Surgical History:   Procedure Laterality Date    BREAST CYST ASPIRATION  2006    COLONOSCOPY      DILATION AND CURETTAGE OF UTERUS      EXPLORATORY LAPAROTOMY      HIP SURGERY  05/03/2016    Right side     HIP SURGERY  10/07/2021    Left side     JOINT REPLACEMENT      NASAL SEPTUM SURGERY      Nasal Septal Deviation Repair    NOSE SURGERY      Closed Treat Nasal Bone Fx w/ Manipulation Stabilization    REPAIR EXTENSOR TENDON HAND         Family History:  Family History   Problem Relation Age of Onset    Dementia Mother     Aneurysm Father         of the Left Middle Cerebral Artery    Coronary artery disease Father     Hypertension Father     Prostate cancer Father 74    Asthma Sister     Breast cancer Sister 66    Hypertension Brother     Asthma Son 19    Bipolar disorder Son         NOS    Depression Child         Emotional Depression    Ovarian cancer Maternal Grandmother 57    Lung cancer Maternal Grandfather 62    Breast cancer Maternal Aunt 80    Breast cancer Other 48    Pancreatic cancer Maternal Uncle 37    No Known Problems Maternal Aunt     No Known Problems Paternal Aunt        Social History:  Social History     Socioeconomic History    Marital status: /Civil Union     Spouse name: Not on file    Number of children: Not on file    Years of education: Not on file    Highest education level: Not on file   Occupational History    Not on file   Tobacco Use    Smoking status: Former     Current packs/day: 0.00     Types:  Cigarettes     Quit date: 1978     Years since quittin.0    Smokeless tobacco: Never   Vaping Use    Vaping status: Never Used   Substance and Sexual Activity    Alcohol use: Yes     Alcohol/week: 3.0 standard drinks of alcohol     Types: 3 Standard drinks or equivalent per week     Comment: socially drinks 1 time per week    Drug use: Never    Sexual activity: Yes     Partners: Male     Birth control/protection: Post-menopausal   Other Topics Concern    Not on file   Social History Narrative    Caffeine use: 1 20 oz of coffee     Social Determinants of Health     Financial Resource Strain: Not on file   Food Insecurity: Not on file   Transportation Needs: Not on file   Physical Activity: Not on file   Stress: Not on file   Social Connections: Not on file   Intimate Partner Violence: Not on file   Housing Stability: Not on file       Objective     Vitals:    24 0843   BP: 116/82   Pulse: 73   Resp: 16   SpO2: 96%     Wt Readings from Last 3 Encounters:   24 83.6 kg (184 lb 6.4 oz)   24 84.8 kg (187 lb)   24 85 kg (187 lb 6.4 oz)       Physical Exam  Vitals and nursing note reviewed.   Constitutional:       General: She is not in acute distress.     Appearance: Normal appearance. She is not ill-appearing or toxic-appearing.      Comments: Looks younger than stated age.     Cardiovascular:      Rate and Rhythm: Normal rate and regular rhythm.      Pulses: Normal pulses.      Heart sounds: Normal heart sounds. No murmur heard.  Pulmonary:      Effort: Pulmonary effort is normal. No respiratory distress.      Breath sounds: Normal breath sounds. No wheezing, rhonchi or rales.   Musculoskeletal:      Right lower leg: No edema.      Left lower leg: No edema.   Neurological:      Mental Status: She is alert.   Psychiatric:         Mood and Affect: Mood normal.         Behavior: Behavior normal.         Thought Content: Thought content normal.         Judgment: Judgment normal.  "        Pertinent Laboratory/Diagnostic Studies:  Lab Results   Component Value Date    BUN 15 07/26/2024    CREATININE 0.72 07/26/2024    CALCIUM 8.9 07/26/2024    K 3.9 07/26/2024    CO2 25 07/26/2024     07/26/2024     Lab Results   Component Value Date    ALT 29 07/26/2024    AST 19 07/26/2024    ALKPHOS 73 07/26/2024       Lab Results   Component Value Date    WBC 6.90 07/26/2024    HGB 13.0 07/26/2024    HCT 40.4 07/26/2024    MCV 92 07/26/2024     07/26/2024       Lab Results   Component Value Date    TSH 1.60 05/04/2022       No results found for: \"CHOL\"  Lab Results   Component Value Date    TRIG 82 07/26/2024     Lab Results   Component Value Date    HDL 57 07/26/2024     Lab Results   Component Value Date    LDLCALC 133 (H) 07/26/2024     Lab Results   Component Value Date    HGBA1C 5.5 12/27/2022       Results for orders placed or performed during the hospital encounter of 09/04/24   Stress strip   Result Value Ref Range    Protocol Name JOSE C     Exercise duration (min) 4 min    Exercise duration (sec) 31 sec    Post Peak Systolic  mmHg    Max Diastolic Bp 86 mmHg    Peak  BPM    Max Predicted Heart Rate 157 BPM    Reason for Termination Fatigue  Dyspnea  Target Heart Rate Achieved       Test Indication Dyspnea     Target Hr Formular (220 - Age)*85%     Arrhy During Ex      ECG Interp Before Ex      ECG Interp during Ex      Ex Summary Comment      Chest Pain Statement none     Overall Hr Response To Exercise      Overall BP Response To Exercise     Echo stress test, exercise   Result Value Ref Range    Triscuspid Valve Regurgitation Peak Gradient 20.0 mmHg    RAA A4C 15.2 cm2    MV Peak A Alvaro 0.76 m/s    MV stenosis pressure 1/2 time 56 ms    MV Peak E Alvaro 63 cm/s    E wave deceleration time 194 ms    E/A ratio 0.83     MV valve area p 1/2 method 3.93     TR Peak Alvaro 2.2 m/s    RVID d 2.9 cm    Tricuspid valve peak regurgitation velocity 2.21 m/s    Left ventricular stroke " volume (2D) 31.00 mL    IVSd 0.90 cm    Tricuspid annular plane systolic excursion 2.40 cm    Ao root 3.30 cm    LVPWd 0.80 cm    LA size 3.1 cm    Asc Ao 3 cm    FS 14 28 - 44    LVIDS 4.20 cm    IVS 0.9 cm    LVIDd 4.90 cm    LEFT VENTRICLE SYSTOLIC VOLUME (MOD BIPLANE) 2D 80 mL    LV DIASTOLIC VOLUME (MOD BIPLANE) 2D 112 mL    Left Atrium Area-systolic Four Chamber 10.6 cm2    MV E' Tissue Velocity Lateral 9 cm/s    MV E' Tissue Velocity Septal 7 cm/s    LVSV, 2D 31 mL    Baseline HR 75 bpm    Baseline /80 mmHg    O2 sat rest 96 %    Stress peak  bpm    Post peak  mmHg    O2 sat peak 98 %    Recovery HR 81 bpm    Recovery /88 mmHg    O2 sat recovery 98 %    Max  bpm    Max HR Percent 94 %    Exercise duration (min) 4 min    Exercise duration (sec) 31 sec    Estimated workload 6.4 METS    Rate Pressure Product 28,120.0     Angina Index 0     Stress Stage Reached 2.0     LV EF 55        No orders of the defined types were placed in this encounter.      ALLERGIES:  Allergies   Allergen Reactions    Sulfa Antibiotics Anaphylaxis and Hives     HIVES    Cefazolin      Ancef another name for cefazolin    Other Hives    Penicillins        Current Medications     Current Outpatient Medications   Medication Sig Dispense Refill    albuterol (2.5 mg/3 mL) 0.083 % nebulizer solution Take 3 mL (2.5 mg total) by nebulization every 6 (six) hours as needed for wheezing or shortness of breath 30 mL 0    budesonide (PULMICORT) 90 MCG/ACT inhaler Inhale 1 puff daily      Cetirizine HCl 10 MG CAPS Take 1 capsule by mouth daily      Cholecalciferol (VITAMIN D PO) Take 5,000 Units by mouth daily      EPINEPHrine (EPIPEN) 0.3 mg/0.3 mL SOAJ Inject 0.3 mg into outer thigh prn anaphylaxis 2 each 1    levothyroxine 50 mcg tablet Take 1 tablet (50 mcg total) by mouth daily 100 tablet 1    Lutein 10 MG TABS Take 5 mg by mouth daily      mometasone (NASONEX) 50 mcg/act nasal spray SHAKE LQ AND U 1 SPR IEN IN THE  MORNING  11    Omega-3 Fatty Acids (fish oil) 1,000 mg Use      omeprazole (PriLOSEC) 10 mg delayed release capsule Take 10 mg by mouth daily      benzonatate (TESSALON) 200 MG capsule Take 1 capsule (200 mg total) by mouth 3 (three) times a day as needed for cough 20 capsule 0    clindamycin (CLEOCIN) 150 mg capsule       hydrOXYzine HCL (ATARAX) 25 mg tablet TAKE 1 TABLET BY MOUTH EVERY 8 HOURS AS NEEDED FOR ITCHING. 270 tablet 1    Symbicort 80-4.5 MCG/ACT inhaler USE 2 PUFFS TWICE A DAY AS DIRECTED VIA SPACER - RINSE MOUTH AFTER USE. (Patient not taking: Reported on 8/13/2024)       No current facility-administered medications for this visit.         Health Maintenance     Health Maintenance   Topic Date Due    Pneumococcal Vaccine: Pediatrics (0 to 5 Years) and At-Risk Patients (6 to 64 Years) (1 of 2 - PCV) Never done    HIV Screening  Never done    PT PLAN OF CARE  01/01/2021    COVID-19 Vaccine (5 - 2023-24 season) 09/01/2024    Breast Cancer Screening: Mammogram  08/29/2024    Influenza Vaccine (1) 09/01/2024    RSV Vaccine Age 60+ Years (1 - 1-dose 60+ series) 12/04/2024 (Originally 7/27/2021)    Zoster Vaccine (1 of 2) 07/23/2025 (Originally 7/27/2011)    Annual Physical  07/23/2025    Depression Screening  09/24/2025    Colorectal Cancer Screening  04/19/2026    Cervical Cancer Screening  08/13/2029    DTaP,Tdap,and Td Vaccines (2 - Td or Tdap) 09/18/2029    Hepatitis C Screening  Completed    RSV Vaccine age 0-20 Months  Aged Out    HIB Vaccine  Aged Out    IPV Vaccine  Aged Out    Hepatitis A Vaccine  Aged Out    Meningococcal ACWY Vaccine  Aged Out    HPV Vaccine  Aged Out     Immunization History   Administered Date(s) Administered    COVID-19 PFIZER VACCINE 0.3 ML IM 03/20/2021, 04/10/2021, 12/04/2021, 06/08/2022    INFLUENZA 10/18/2019, 10/01/2020    Influenza, recombinant, quadrivalent,injectable, preservative free 10/18/2019, 10/01/2020    Td (adult), adsorbed 07/29/2005    Tdap 09/18/2019        Depression Screening and Follow-up Plan: Patient was screened for depression during today's encounter. They screened negative with a PHQ-2 score of 0.    Lung Cancer Screening Shared Decision Making: I discussed with her that she is a candidate for lung cancer CT screening.     The following Shared Decision-Making points were covered:  Benefits of screening were discussed, including the rates of reduction in death from lung cancer and other causes.  Harms of screening were reviewed, including false positive tests, radiation exposure levels, risks of invasive procedures, risks of complications of screening, and risk of overdiagnosis.  I counseled on the importance of adherence to annual lung cancer LDCT screening, impact of co-morbidities, and ability or willingness to undergo diagnosis and treatment.  I counseled on the importance of maintaining abstinence as a former smoker or was counseled on the importance of smoking cessation if a current smoker    Review of Eligibility Criteria: She meets all of the criteria for Lung Cancer Screening.   - She is 64 y.o.   - She has 20 pack year tobacco history and is a current smoker or has quit within the past 15 years  - She presents no signs or symptoms of lung cancer    After discussion, the patient decided to elect lung cancer screening.        Jenniffer Caro DO

## 2024-10-19 ENCOUNTER — HOSPITAL ENCOUNTER (OUTPATIENT)
Dept: MAMMOGRAPHY | Facility: MEDICAL CENTER | Age: 64
Discharge: HOME | End: 2024-10-19
Payer: COMMERCIAL

## 2024-10-19 VITALS — WEIGHT: 184 LBS | HEIGHT: 67 IN | BODY MASS INDEX: 28.88 KG/M2

## 2024-10-19 DIAGNOSIS — Z12.31 BREAST CANCER SCREENING BY MAMMOGRAM: ICD-10-CM

## 2024-10-19 DIAGNOSIS — Z91.89 INCREASED RISK OF BREAST CANCER: ICD-10-CM

## 2024-10-19 PROCEDURE — 77063 BREAST TOMOSYNTHESIS BI: CPT

## 2024-10-19 PROCEDURE — 77067 SCR MAMMO BI INCL CAD: CPT

## 2024-10-22 DIAGNOSIS — Z91.89 INCREASED RISK OF BREAST CANCER: Primary | ICD-10-CM

## 2024-11-06 ENCOUNTER — TELEPHONE (OUTPATIENT)
Age: 64
End: 2024-11-06

## 2024-11-06 NOTE — TELEPHONE ENCOUNTER
----- Message from Jessica Gee MD sent at 11/6/2024 12:30 PM EST -----  Results not reviewed via [employer]. Please call to review results as per [employer] message attached.

## 2024-12-07 ENCOUNTER — OFFICE VISIT (OUTPATIENT)
Dept: URGENT CARE | Facility: CLINIC | Age: 64
End: 2024-12-07
Payer: COMMERCIAL

## 2024-12-07 VITALS
SYSTOLIC BLOOD PRESSURE: 114 MMHG | RESPIRATION RATE: 18 BRPM | TEMPERATURE: 97.3 F | OXYGEN SATURATION: 98 % | DIASTOLIC BLOOD PRESSURE: 74 MMHG | HEART RATE: 85 BPM

## 2024-12-07 DIAGNOSIS — R05.1 ACUTE COUGH: Primary | ICD-10-CM

## 2024-12-07 PROCEDURE — S9083 URGENT CARE CENTER GLOBAL: HCPCS | Performed by: NURSE PRACTITIONER

## 2024-12-07 PROCEDURE — G0384 LEV 5 HOSP TYPE B ED VISIT: HCPCS | Performed by: NURSE PRACTITIONER

## 2024-12-07 RX ORDER — DEXTROMETHORPHAN HYDROBROMIDE AND PROMETHAZINE HYDROCHLORIDE 15; 6.25 MG/5ML; MG/5ML
5 SYRUP ORAL 4 TIMES DAILY PRN
Qty: 240 ML | Refills: 0 | Status: SHIPPED | OUTPATIENT
Start: 2024-12-07

## 2024-12-07 NOTE — PATIENT INSTRUCTIONS
Take meds as directed  Continue your daily meds as directed  Continue nasal spray and your zyrtec at night     Take cough medicine as directed

## 2024-12-07 NOTE — PROGRESS NOTES
NAME: Jaida Murphy is a 64 y.o. female  : 1960    MRN: [id number]    /74   Pulse 85   Temp (!) 97.3 °F (36.3 °C) (Tympanic)   Resp 18   LMP  (LMP Unknown)   SpO2 98%     10:19 AM    Assessment and Plan   Acute cough [R05.1]  1. Acute cough  promethazine-dextromethorphan (PHENERGAN-DM) 6.25-15 mg/5 mL oral syrup          Jaida was seen today for cough.    Diagnoses and all orders for this visit:    Acute cough  -     promethazine-dextromethorphan (PHENERGAN-DM) 6.25-15 mg/5 mL oral syrup; Take 5 mL by mouth 4 (four) times a day as needed for cough        Patient Instructions     Patient Instructions   Take meds as directed  Continue your daily meds as directed  Continue nasal spray and your zyrtec at night     Take cough medicine as directed    Proceed to the nearest ER if symptoms worsen, Follow up with your PCP  Continue to social distance, wash your hands, and wear your masks. Please continue to follow the CDC.gov guidelines daily for they are subject to change on COVID-19    Chief Complaint     Chief Complaint   Patient presents with    Cough     Cough ongoing since Thanks night. No fevers noted. No previous covid testing done. Pts  also ill with similar s/s. Pt also notes nasal congestion. Cough is producing minimal mucus. Pts unable to lay flat in bed, sitting in a chair to rest.          History of Present Illness     64 yr old F here today with cough since  night, no fevers present, no covid testing done prior to arrival and her  sick with similar complaints. Nasal congestion       Cough  This is a new problem. The current episode started in the past 7 days. The problem has been waxing and waning. The problem occurs every few minutes. The cough is Non-productive and productive of sputum. Associated symptoms include nasal congestion, postnasal drip and a sore throat. Pertinent negatives include no chest pain, chills, ear congestion, ear pain, fever,  headaches, heartburn, hemoptysis, myalgias, rash, rhinorrhea, shortness of breath, sweats, weight loss or wheezing. The symptoms are aggravated by lying down.           Review of Systems   Review of Systems   Constitutional:  Negative for chills, fever and weight loss.   HENT:  Positive for postnasal drip and sore throat. Negative for ear pain and rhinorrhea.    Respiratory:  Positive for cough. Negative for hemoptysis, shortness of breath and wheezing.    Cardiovascular:  Negative for chest pain.   Gastrointestinal:  Negative for heartburn.   Musculoskeletal:  Negative for myalgias.   Skin:  Negative for rash.   Neurological:  Negative for headaches.         Current Medications       Current Outpatient Medications:     promethazine-dextromethorphan (PHENERGAN-DM) 6.25-15 mg/5 mL oral syrup, Take 5 mL by mouth 4 (four) times a day as needed for cough, Disp: 240 mL, Rfl: 0    albuterol (2.5 mg/3 mL) 0.083 % nebulizer solution, Take 3 mL (2.5 mg total) by nebulization every 6 (six) hours as needed for wheezing or shortness of breath, Disp: 30 mL, Rfl: 0    budesonide (PULMICORT) 90 MCG/ACT inhaler, Inhale 1 puff daily, Disp: , Rfl:     Cetirizine HCl 10 MG CAPS, Take 1 capsule by mouth daily, Disp: , Rfl:     Cholecalciferol (VITAMIN D PO), Take 5,000 Units by mouth daily, Disp: , Rfl:     EPINEPHrine (EPIPEN) 0.3 mg/0.3 mL SOAJ, Inject 0.3 mg into outer thigh prn anaphylaxis, Disp: 2 each, Rfl: 1    levothyroxine 50 mcg tablet, Take 1 tablet (50 mcg total) by mouth daily, Disp: 100 tablet, Rfl: 1    Lutein 10 MG TABS, Take 5 mg by mouth daily, Disp: , Rfl:     mometasone (NASONEX) 50 mcg/act nasal spray, SHAKE LQ AND U 1 SPR IEN IN THE MORNING, Disp: , Rfl: 11    Omega-3 Fatty Acids (fish oil) 1,000 mg, Use, Disp: , Rfl:     omeprazole (PriLOSEC) 10 mg delayed release capsule, Take 10 mg by mouth daily, Disp: , Rfl:     Current Allergies     Allergies as of 12/07/2024 - Reviewed 12/07/2024   Allergen Reaction Noted     Sulfa antibiotics Anaphylaxis and Hives 07/29/2013    Cefazolin  09/23/2013    Other Hives 08/28/2023    Penicillins  07/29/2013              Past Medical History:   Diagnosis Date    Arthritis     BRCA1 negative     BRCA2 negative     Colon polyp     Disease of thyroid gland     History of acute sinusitis     History of dysphagia     History of headache     new onset    History of persistent cough     Pain of both hip joints        Past Surgical History:   Procedure Laterality Date    BREAST CYST ASPIRATION  2006    COLONOSCOPY      DILATION AND CURETTAGE OF UTERUS      EXPLORATORY LAPAROTOMY      HIP SURGERY  05/03/2016    Right side     HIP SURGERY  10/07/2021    Left side     JOINT REPLACEMENT      NASAL SEPTUM SURGERY      Nasal Septal Deviation Repair    NOSE SURGERY      Closed Treat Nasal Bone Fx w/ Manipulation Stabilization    REPAIR EXTENSOR TENDON HAND         Family History   Problem Relation Age of Onset    Dementia Mother     Aneurysm Father         of the Left Middle Cerebral Artery    Coronary artery disease Father     Hypertension Father     Prostate cancer Father 74    Asthma Sister     Breast cancer Sister 66    Ovarian cancer Maternal Grandmother 57    Lung cancer Maternal Grandfather 62    Hypertension Brother     Asthma Son 19    Bipolar disorder Son         NOS    Depression Child         Emotional Depression    Breast cancer Maternal Aunt 80    No Known Problems Maternal Aunt     Pancreatic cancer Maternal Uncle 37    No Known Problems Paternal Aunt     Breast cancer Other 48         Medications have been verified.    The following portions of the patient's history were reviewed and updated as appropriate: allergies, current medications, past family history, past medical history, past social history, past surgical history and problem list.    Objective   /74   Pulse 85   Temp (!) 97.3 °F (36.3 °C) (Tympanic)   Resp 18   LMP  (LMP Unknown)   SpO2 98%      Physical Exam  "    Physical Exam  Constitutional:       Appearance: Normal appearance.   HENT:      Head: Normocephalic.      Right Ear: Tympanic membrane, ear canal and external ear normal. There is no impacted cerumen.      Left Ear: Tympanic membrane, ear canal and external ear normal. There is no impacted cerumen.      Nose: Nose normal. No congestion or rhinorrhea.      Mouth/Throat:      Mouth: Mucous membranes are moist.      Pharynx: No oropharyngeal exudate or posterior oropharyngeal erythema.   Eyes:      Pupils: Pupils are equal, round, and reactive to light.   Cardiovascular:      Rate and Rhythm: Normal rate and regular rhythm.      Pulses: Normal pulses.      Heart sounds: Normal heart sounds. No murmur heard.     No friction rub.   Pulmonary:      Effort: Pulmonary effort is normal. No respiratory distress.      Breath sounds: Normal breath sounds. No stridor. No wheezing, rhonchi or rales.      Comments: Dry cough, pt is over having this cough, has tried mulitple things with little relief.   Chest:      Chest wall: No tenderness.   Abdominal:      General: Bowel sounds are normal.      Palpations: Abdomen is soft.   Musculoskeletal:         General: Normal range of motion.      Cervical back: Normal range of motion.   Skin:     General: Skin is warm.      Capillary Refill: Capillary refill takes less than 2 seconds.   Neurological:      General: No focal deficit present.      Mental Status: She is alert.               Note: Portions of this record may have been created with voice recognition software. Occasional wrong word or \"sound a like\" substitutions may have occurred due to the inherent limitations of voice recognition software. Please read the chart carefully and recognize, using context, where substitutions have occurred.*    BRIAN Sinclair    "

## 2024-12-09 ENCOUNTER — APPOINTMENT (EMERGENCY)
Dept: RADIOLOGY | Facility: HOSPITAL | Age: 64
End: 2024-12-09
Payer: COMMERCIAL

## 2024-12-09 ENCOUNTER — HOSPITAL ENCOUNTER (EMERGENCY)
Facility: HOSPITAL | Age: 64
Discharge: HOME | End: 2024-12-09
Attending: EMERGENCY MEDICINE
Payer: COMMERCIAL

## 2024-12-09 VITALS
HEART RATE: 103 BPM | OXYGEN SATURATION: 98 % | TEMPERATURE: 98.7 F | RESPIRATION RATE: 18 BRPM | BODY MASS INDEX: 28.51 KG/M2 | WEIGHT: 182 LBS | DIASTOLIC BLOOD PRESSURE: 76 MMHG | SYSTOLIC BLOOD PRESSURE: 126 MMHG

## 2024-12-09 DIAGNOSIS — R11.2 NAUSEA AND VOMITING: Primary | ICD-10-CM

## 2024-12-09 DIAGNOSIS — R10.9 ABDOMINAL PAIN: ICD-10-CM

## 2024-12-09 DIAGNOSIS — R05.9 COUGH: ICD-10-CM

## 2024-12-09 DIAGNOSIS — R19.7 DIARRHEA: ICD-10-CM

## 2024-12-09 LAB
ALBUMIN SERPL BCG-MCNC: 4.4 G/DL (ref 3.5–5)
ALP SERPL-CCNC: 89 U/L (ref 34–104)
ALT SERPL W P-5'-P-CCNC: 28 U/L (ref 7–52)
ANION GAP SERPL CALCULATED.3IONS-SCNC: 7 MMOL/L (ref 4–13)
AST SERPL W P-5'-P-CCNC: 18 U/L (ref 13–39)
BASOPHILS # BLD MANUAL: 0 THOUSAND/UL (ref 0–0.1)
BASOPHILS NFR MAR MANUAL: 0 % (ref 0–1)
BILIRUB SERPL-MCNC: 0.83 MG/DL (ref 0.2–1)
BILIRUB UR QL STRIP: NEGATIVE
BUN SERPL-MCNC: 14 MG/DL (ref 5–25)
CALCIUM SERPL-MCNC: 9.1 MG/DL (ref 8.4–10.2)
CHLORIDE SERPL-SCNC: 103 MMOL/L (ref 96–108)
CLARITY UR: CLEAR
CO2 SERPL-SCNC: 29 MMOL/L (ref 21–32)
COLOR UR: YELLOW
CREAT SERPL-MCNC: 0.83 MG/DL (ref 0.6–1.3)
EOSINOPHIL # BLD MANUAL: 0.27 THOUSAND/UL (ref 0–0.4)
EOSINOPHIL NFR BLD MANUAL: 2 % (ref 0–6)
ERYTHROCYTE [DISTWIDTH] IN BLOOD BY AUTOMATED COUNT: 13.2 % (ref 11.6–15.1)
FLUAV AG UPPER RESP QL IA.RAPID: NEGATIVE
FLUBV AG UPPER RESP QL IA.RAPID: NEGATIVE
GFR SERPL CREATININE-BSD FRML MDRD: 75 ML/MIN/1.73SQ M
GLUCOSE SERPL-MCNC: 127 MG/DL (ref 65–140)
GLUCOSE UR STRIP-MCNC: NEGATIVE MG/DL
HCT VFR BLD AUTO: 45.7 % (ref 34.8–46.1)
HGB BLD-MCNC: 14.7 G/DL (ref 11.5–15.4)
HGB UR QL STRIP.AUTO: NEGATIVE
KETONES UR STRIP-MCNC: NEGATIVE MG/DL
LEUKOCYTE ESTERASE UR QL STRIP: NEGATIVE
LIPASE SERPL-CCNC: 29 U/L (ref 11–82)
LYMPHOCYTES # BLD AUTO: 0.14 THOUSAND/UL (ref 0.6–4.47)
LYMPHOCYTES # BLD AUTO: 1 % (ref 14–44)
MCH RBC QN AUTO: 29 PG (ref 26.8–34.3)
MCHC RBC AUTO-ENTMCNC: 32.2 G/DL (ref 31.4–37.4)
MCV RBC AUTO: 90 FL (ref 82–98)
MONOCYTES # BLD AUTO: 0.41 THOUSAND/UL (ref 0–1.22)
MONOCYTES NFR BLD: 3 % (ref 4–12)
NEUTROPHILS # BLD MANUAL: 12.76 THOUSAND/UL (ref 1.85–7.62)
NEUTS BAND NFR BLD MANUAL: 1 % (ref 0–8)
NEUTS SEG NFR BLD AUTO: 93 % (ref 43–75)
NITRITE UR QL STRIP: NEGATIVE
PH UR STRIP.AUTO: 5.5 [PH] (ref 4.5–8)
PLATELET # BLD AUTO: 311 THOUSANDS/UL (ref 149–390)
PLATELET BLD QL SMEAR: ADEQUATE
PMV BLD AUTO: 9 FL (ref 8.9–12.7)
POTASSIUM SERPL-SCNC: 4.4 MMOL/L (ref 3.5–5.3)
PROT SERPL-MCNC: 7.6 G/DL (ref 6.4–8.4)
PROT UR STRIP-MCNC: NEGATIVE MG/DL
RBC # BLD AUTO: 5.07 MILLION/UL (ref 3.81–5.12)
RBC MORPH BLD: NORMAL
SARS-COV+SARS-COV-2 AG RESP QL IA.RAPID: NEGATIVE
SODIUM SERPL-SCNC: 139 MMOL/L (ref 135–147)
SP GR UR STRIP.AUTO: 1.02 (ref 1–1.03)
UROBILINOGEN UR QL STRIP.AUTO: 0.2 E.U./DL
WBC # BLD AUTO: 13.57 THOUSAND/UL (ref 4.31–10.16)

## 2024-12-09 PROCEDURE — 87811 SARS-COV-2 COVID19 W/OPTIC: CPT | Performed by: EMERGENCY MEDICINE

## 2024-12-09 PROCEDURE — 85007 BL SMEAR W/DIFF WBC COUNT: CPT | Performed by: EMERGENCY MEDICINE

## 2024-12-09 PROCEDURE — 80053 COMPREHEN METABOLIC PANEL: CPT | Performed by: EMERGENCY MEDICINE

## 2024-12-09 PROCEDURE — 85027 COMPLETE CBC AUTOMATED: CPT | Performed by: EMERGENCY MEDICINE

## 2024-12-09 PROCEDURE — 81003 URINALYSIS AUTO W/O SCOPE: CPT

## 2024-12-09 PROCEDURE — 99285 EMERGENCY DEPT VISIT HI MDM: CPT | Performed by: EMERGENCY MEDICINE

## 2024-12-09 PROCEDURE — 99284 EMERGENCY DEPT VISIT MOD MDM: CPT

## 2024-12-09 PROCEDURE — 87804 INFLUENZA ASSAY W/OPTIC: CPT | Performed by: EMERGENCY MEDICINE

## 2024-12-09 PROCEDURE — 83690 ASSAY OF LIPASE: CPT | Performed by: EMERGENCY MEDICINE

## 2024-12-09 PROCEDURE — 36415 COLL VENOUS BLD VENIPUNCTURE: CPT | Performed by: EMERGENCY MEDICINE

## 2024-12-09 PROCEDURE — 96374 THER/PROPH/DIAG INJ IV PUSH: CPT

## 2024-12-09 PROCEDURE — 96361 HYDRATE IV INFUSION ADD-ON: CPT

## 2024-12-09 PROCEDURE — 71046 X-RAY EXAM CHEST 2 VIEWS: CPT

## 2024-12-09 RX ORDER — ONDANSETRON 2 MG/ML
4 INJECTION INTRAMUSCULAR; INTRAVENOUS ONCE
Status: COMPLETED | OUTPATIENT
Start: 2024-12-09 | End: 2024-12-09

## 2024-12-09 RX ORDER — ONDANSETRON 4 MG/1
4 TABLET, ORALLY DISINTEGRATING ORAL EVERY 8 HOURS PRN
Qty: 15 TABLET | Refills: 0 | Status: SHIPPED | OUTPATIENT
Start: 2024-12-09

## 2024-12-09 RX ORDER — DICYCLOMINE HCL 20 MG
20 TABLET ORAL 2 TIMES DAILY
Qty: 20 TABLET | Refills: 0 | Status: SHIPPED | OUTPATIENT
Start: 2024-12-09

## 2024-12-09 RX ORDER — BENZONATATE 100 MG/1
100 CAPSULE ORAL EVERY 8 HOURS
Qty: 21 CAPSULE | Refills: 0 | Status: SHIPPED | OUTPATIENT
Start: 2024-12-09

## 2024-12-09 RX ORDER — BENZONATATE 100 MG/1
100 CAPSULE ORAL ONCE
Status: COMPLETED | OUTPATIENT
Start: 2024-12-09 | End: 2024-12-09

## 2024-12-09 RX ORDER — DICYCLOMINE HCL 20 MG
20 TABLET ORAL ONCE
Status: COMPLETED | OUTPATIENT
Start: 2024-12-09 | End: 2024-12-09

## 2024-12-09 RX ADMIN — ONDANSETRON 4 MG: 2 INJECTION INTRAMUSCULAR; INTRAVENOUS at 17:15

## 2024-12-09 RX ADMIN — BENZONATATE 100 MG: 100 CAPSULE ORAL at 17:14

## 2024-12-09 RX ADMIN — DICYCLOMINE HYDROCHLORIDE 20 MG: 20 TABLET ORAL at 17:14

## 2024-12-09 RX ADMIN — SODIUM CHLORIDE 2000 ML: 0.9 INJECTION, SOLUTION INTRAVENOUS at 16:46

## 2024-12-09 NOTE — ED PROVIDER NOTES
Time reflects when diagnosis was documented in both MDM as applicable and the Disposition within this note       Time User Action Codes Description Comment    12/9/2024  6:59 PM Bendock, Irina L Add [R11.2] Nausea and vomiting     12/9/2024  6:59 PM Bendock, Irina L Add [R19.7] Diarrhea     12/9/2024  6:59 PM Bendock, Irina L Add [R10.9] Abdominal pain     12/9/2024  6:59 PM Bendock, Irina L Add [R05.9] Cough           ED Disposition       ED Disposition   Discharge    Condition   Stable    Date/Time   Mon Dec 9, 2024  6:59 PM    Comment   Jaida Murphy discharge to home/self care.                   Assessment & Plan       Medical Decision Making      Differential diagnosis includes but not limited to:  Viral etiology, biliary colic, cholecystitis, obstruction, gastritis, diabetic gastroparesis, appendicitis, hepatitis, mi, AFib, torsion, kidney stones, DKA, increased ICP, meningitis, withdrawal from medication, pregnancy, psychogenic, radiation, migraines, labyrinthitis, Meniere's        Problems Addressed:  Abdominal pain: acute illness or injury  Cough: acute illness or injury  Diarrhea: acute illness or injury  Nausea and vomiting: acute illness or injury    Amount and/or Complexity of Data Reviewed  Independent Historian: spouse     Details:     Patient's  at bedside  External Data Reviewed: notes.  Labs: ordered. Decision-making details documented in ED Course.     Details:     Urine clear  Mild leukocytosis without any anemia or thrombocytopenia  No acute kidney injury without any electrolyte abnormality  COVID/Flu negative  Lipase WNL          Radiology: ordered. Decision-making details documented in ED Course.     Details:     Chest x-ray interpreted by myself as no acute findings (and confirmed by radiologist)        Risk  Prescription drug management.        ED Course as of 12/09/24 2121   Mon Dec 09, 2024   1644 Patient is a 64-year-old female with onset of nausea vomiting and diarrhea  "that started today.  On exam she is resting in bed in no distress.  Dry mucous membranes otherwise hemodynamically stable.  She does have a cough that is nonproductive.  Will continue with labs, urine is clear.  Will give IV fluids as well as Zofran and Bentyl for symptomatic control and Tessalon Perles for cough.  Will also obtain chest x-ray      Disclosure: Voice to text software was used in the preparation of this document and could have resulted in translational errors.      Occasional wrong word or \"sound a like\" substitutions may have occurred due to the inherent limitations of voice recognition software.  Read the chart carefully and recognize, using context, where substitutions have occurred.       I have independently reviewed external records are available to me to the level of detail possible within the time constraints of my patient care responsibilities in the ED.       1651 Urine Macroscopic, POC  Normal       1724   Labs reviewed and without actionable derangement    Noted mild leukocytosis with no bands.       1740 FLU/COVID Rapid Antigen (30 min. TAT) - Preferred screening test in ED  Negative       1754 XR chest 2 views  No acute findings       1858 Patient tolerating p.o. well.  Patient states that she feels markedly improved.  Will check vital signs.  Patient was eating crackers and drinking ginger ale's.  No vomiting or diarrhea since medication.      Long discussion with patient and  to keep to a bland diet and increase fluids.  Will give Zofran and Bentyl for home as well as Tessalon Perles for cough.  Strict return to ER instructions given.  Answered questions at bedside.    Counseling: I had a detailed discussion with the patient and/or guardian regarding: the historical points, exam findings, and any diagnostic results supporting the discharge diagnosis, lab results, radiology results, discharge instructions reviewed with patient and/or family/caregiver and understanding was " verbalized. Instructions given to return to the emergency department if symptoms worsen or persist, or if there are any questions or concerns that arise at home.     All imaging and/or lab testing discussed with patient, strict return to ED precautions discussed. Patient recommended to follow up promptly with appropriate outpatient provider. Patient and/or family members verbalizes understanding and agrees with plan. Patient and/or family members were given opportunity to ask questions, all questions were answered at this time. Patient is stable for discharge           Medications   sodium chloride 0.9 % bolus 2,000 mL (0 mL Intravenous Stopped 12/9/24 1825)   ondansetron (ZOFRAN) injection 4 mg (4 mg Intravenous Given 12/9/24 1715)   dicyclomine (BENTYL) tablet 20 mg (20 mg Oral Given 12/9/24 1714)   benzonatate (TESSALON PERLES) capsule 100 mg (100 mg Oral Given 12/9/24 1714)       ED Risk Strat Scores                           SBIRT 22yo+      Flowsheet Row Most Recent Value   Initial Alcohol Screen: US AUDIT-C     1. How often do you have a drink containing alcohol? 0 Filed at: 12/09/2024 1641   2. How many drinks containing alcohol do you have on a typical day you are drinking?  0 Filed at: 12/09/2024 1641   3b. FEMALE Any Age, or MALE 65+: How often do you have 4 or more drinks on one occassion? 0 Filed at: 12/09/2024 1641   Audit-C Score 0 Filed at: 12/09/2024 1641   BARBARA: How many times in the past year have you...    Used an illegal drug or used a prescription medication for non-medical reasons? Never Filed at: 12/09/2024 1641                            History of Present Illness       Chief Complaint   Patient presents with    Abdominal Pain     Started this morning with n/v/d and chills.        Past Medical History:   Diagnosis Date    Arthritis     BRCA1 negative     BRCA2 negative     Colon polyp     Disease of thyroid gland     History of acute sinusitis     History of dysphagia     History of headache      new onset    History of persistent cough     Pain of both hip joints       Past Surgical History:   Procedure Laterality Date    BREAST CYST ASPIRATION  2006    COLONOSCOPY      DILATION AND CURETTAGE OF UTERUS      EXPLORATORY LAPAROTOMY      HIP SURGERY  2016    Right side     HIP SURGERY  10/07/2021    Left side     JOINT REPLACEMENT      NASAL SEPTUM SURGERY      Nasal Septal Deviation Repair    NOSE SURGERY      Closed Treat Nasal Bone Fx w/ Manipulation Stabilization    REPAIR EXTENSOR TENDON HAND        Family History   Problem Relation Age of Onset    Dementia Mother     Aneurysm Father         of the Left Middle Cerebral Artery    Coronary artery disease Father     Hypertension Father     Prostate cancer Father 74    Asthma Sister     Breast cancer Sister 66    Ovarian cancer Maternal Grandmother 57    Lung cancer Maternal Grandfather 62    Hypertension Brother     Asthma Son 19    Bipolar disorder Son         NOS    Depression Child         Emotional Depression    Breast cancer Maternal Aunt 80    No Known Problems Maternal Aunt     Pancreatic cancer Maternal Uncle 37    No Known Problems Paternal Aunt     Breast cancer Other 48      Social History     Tobacco Use    Smoking status: Former     Current packs/day: 0.00     Types: Cigarettes     Quit date: 1978     Years since quittin.2    Smokeless tobacco: Never   Vaping Use    Vaping status: Never Used   Substance Use Topics    Alcohol use: Yes     Alcohol/week: 3.0 standard drinks of alcohol     Types: 3 Standard drinks or equivalent per week     Comment: socially drinks 1 time per week    Drug use: Never      E-Cigarette/Vaping    E-Cigarette Use Never User       E-Cigarette/Vaping Substances      I have reviewed and agree with the history as documented.     Patient is a 64-year-old female with no significant past medical history per patient coming in today with onset of nausea, vomiting and diarrhea that started today.  She states  that she felt well yesterday and woke up this morning with a generalized unwell feeling.  Throughout the day she felt worse and then had persistent episodes of vomiting and diarrhea.  She denies any fevers, chills, hematemesis, coffee-ground emesis, melena or bright red blood per rectum.  She has no chest pain, palpitations or shortness of breath.  She states that she has had a cough for about 10 days and went to the PCP.  She was given cough medicine as well as tested for flu and COVID which were negative.  She has no recent travel, recent antibiotic use.  She has no new food or diet changes.      History provided by:  Patient, medical records and spouse   used: No    Vomiting  Severity:  Moderate  Progression:  Unchanged  Chronicity:  New  Recent urination:  Normal  Context: not post-tussive and not self-induced    Relieved by:  None tried  Worsened by:  Nothing  Ineffective treatments:  None tried  Associated symptoms: abdominal pain (cramping), cough, diarrhea and myalgias    Associated symptoms: no arthralgias, no chills, no fever, no headaches, no sore throat and no URI    Risk factors: no alcohol use, no diabetes, not pregnant, no prior abdominal surgery, no sick contacts, no suspect food intake and no travel to endemic areas        Review of Systems   Constitutional:  Negative for chills and fever.   HENT:  Negative for ear pain and sore throat.    Eyes:  Negative for pain and visual disturbance.   Respiratory:  Positive for cough. Negative for shortness of breath.    Cardiovascular:  Negative for chest pain and palpitations.   Gastrointestinal:  Positive for abdominal pain (cramping), diarrhea and vomiting.   Genitourinary:  Negative for dysuria and hematuria.   Musculoskeletal:  Positive for myalgias. Negative for arthralgias and back pain.   Skin:  Negative for color change and rash.   Neurological:  Negative for seizures, syncope and headaches.   All other systems reviewed and are  negative.          Objective       ED Triage Vitals   Temperature Pulse Blood Pressure Respirations SpO2 Patient Position - Orthostatic VS   12/09/24 1528 12/09/24 1528 12/09/24 1529 12/09/24 1528 12/09/24 1528 12/09/24 1528   98.7 °F (37.1 °C) (!) 119 130/63 20 100 % Sitting      Temp Source Heart Rate Source BP Location FiO2 (%) Pain Score    12/09/24 1528 12/09/24 1528 12/09/24 1528 -- 12/09/24 1528    Oral Monitor Right arm  8      Vitals      Date and Time Temp Pulse SpO2 Resp BP Pain Score FACES Pain Rating User   12/09/24 1904 -- 103 98 % 18 126/76 -- -- AA   12/09/24 1529 -- -- -- -- 130/63 -- -- JS   12/09/24 1528 98.7 °F (37.1 °C) 119 100 % 20 -- 8 -- JS            Physical Exam  Vitals and nursing note reviewed.   Constitutional:       General: She is not in acute distress.     Appearance: She is well-developed.   HENT:      Head: Normocephalic and atraumatic.      Right Ear: External ear normal.      Left Ear: External ear normal.      Mouth/Throat:      Mouth: Mucous membranes are dry.      Comments: Patient maintaining airway and secretions. No stridor . No brawniness under tongue.       Eyes:      General: Lids are normal. Gaze aligned appropriately.      Extraocular Movements: Extraocular movements intact.      Conjunctiva/sclera: Conjunctivae normal.   Neck:      Trachea: Trachea normal.   Cardiovascular:      Rate and Rhythm: Regular rhythm. Tachycardia present.      Pulses:           Radial pulses are 2+ on the right side and 2+ on the left side.        Dorsalis pedis pulses are 2+ on the right side and 2+ on the left side.      Heart sounds: Normal heart sounds, S1 normal and S2 normal. No murmur heard.  Pulmonary:      Effort: Pulmonary effort is normal. No respiratory distress.      Breath sounds: Normal breath sounds.   Abdominal:      General: Bowel sounds are increased.      Palpations: Abdomen is soft.      Tenderness: There is no abdominal tenderness. There is no guarding or rebound.    Musculoskeletal:         General: No swelling.      Cervical back: Neck supple.      Right lower leg: No edema.      Left lower leg: No edema.   Skin:     General: Skin is warm and dry.      Capillary Refill: Capillary refill takes less than 2 seconds.   Neurological:      General: No focal deficit present.      Mental Status: She is alert and oriented to person, place, and time.      GCS: GCS eye subscore is 4. GCS verbal subscore is 5. GCS motor subscore is 6.      Cranial Nerves: Cranial nerves 2-12 are intact.      Sensory: Sensation is intact.      Motor: Motor function is intact.      Coordination: Coordination is intact.      Gait: Gait is intact.   Psychiatric:         Mood and Affect: Mood normal.         Results Reviewed       Procedure Component Value Units Date/Time    RBC Morphology Reflex Test [[id number]] Collected: 12/09/24 1646    Lab Status: Final result Specimen: Blood from Arm, Left Updated: 12/09/24 1801    CBC and differential [[id number]]  (Abnormal) Collected: 12/09/24 1646    Lab Status: Final result Specimen: Blood from Arm, Left Updated: 12/09/24 1720     WBC 13.57 Thousand/uL      RBC 5.07 Million/uL      Hemoglobin 14.7 g/dL      Hematocrit 45.7 %      MCV 90 fL      MCH 29.0 pg      MCHC 32.2 g/dL      RDW 13.2 %      MPV 9.0 fL      Platelets 311 Thousands/uL     Narrative:      This is an appended report.  These results have been appended to a previously verified report.    Manual Differential(PHLEBS Do Not Order) [[id number]]  (Abnormal) Collected: 12/09/24 1646    Lab Status: Final result Specimen: Blood from Arm, Left Updated: 12/09/24 1720     Segmented % 93 %      Bands % 1 %      Lymphocytes % 1 %      Monocytes % 3 %      Eosinophils % 2 %      Basophils % 0 %      Absolute Neutrophils 12.76 Thousand/uL      Absolute Lymphocytes 0.14 Thousand/uL      Absolute Monocytes 0.41 Thousand/uL      Absolute Eosinophils 0.27 Thousand/uL      Absolute Basophils 0.00 Thousand/uL      Total  Counted --     RBC Morphology Normal     Platelet Estimate Adequate    FLU/COVID Rapid Antigen (30 min. TAT) - Preferred screening test in ED [[id number]]  (Normal) Collected: 12/09/24 1648    Lab Status: Final result Specimen: Nares from Nose Updated: 12/09/24 1712     SARS COV Rapid Antigen Negative     Influenza A Rapid Antigen Negative     Influenza B Rapid Antigen Negative    Narrative:      This test has been performed using the Octonotcoidel Magaly 2 FLU+SARS Antigen test under the Emergency Use Authorization (EUA). This test has been validated by the  and verified by the performing laboratory. The Magaly uses lateral flow immunofluorescent sandwich assay to detect SARS-COV, Influenza A and Influenza B Antigen.     The Quidel Magaly 2 SARS Antigen test does not differentiate between SARS-CoV and SARS-CoV-2.     Negative results are presumptive and may be confirmed with a molecular assay, if necessary, for patient management. Negative results do not rule out SARS-CoV-2 or influenza infection and should not be used as the sole basis for treatment or patient management decisions. A negative test result may occur if the level of antigen in a sample is below the limit of detection of this test.     Positive results are indicative of the presence of viral antigens, but do not rule out bacterial infection or co-infection with other viruses.     All test results should be used as an adjunct to clinical observations and other information available to the provider.    FOR PEDIATRIC PATIENTS - copy/paste COVID Guidelines URL to browser: [email or web address]    Comprehensive metabolic panel [[id number]] Collected: 12/09/24 1646    Lab Status: Final result Specimen: Blood from Arm, Left Updated: 12/09/24 1711     Sodium 139 mmol/L      Potassium 4.4 mmol/L      Chloride 103 mmol/L      CO2 29 mmol/L      ANION GAP 7 mmol/L      BUN 14 mg/dL      Creatinine 0.83 mg/dL       Glucose 127 mg/dL      Calcium 9.1 mg/dL      AST 18 U/L      ALT 28 U/L      Alkaline Phosphatase 89 U/L      Total Protein 7.6 g/dL      Albumin 4.4 g/dL      Total Bilirubin 0.83 mg/dL      eGFR 75 ml/min/1.73sq m     Narrative:      National Kidney Disease Foundation guidelines for Chronic Kidney Disease (CKD):     Stage 1 with normal or high GFR (GFR > 90 mL/min/1.73 square meters)    Stage 2 Mild CKD (GFR = 60-89 mL/min/1.73 square meters)    Stage 3A Moderate CKD (GFR = 45-59 mL/min/1.73 square meters)    Stage 3B Moderate CKD (GFR = 30-44 mL/min/1.73 square meters)    Stage 4 Severe CKD (GFR = 15-29 mL/min/1.73 square meters)    Stage 5 End Stage CKD (GFR <15 mL/min/1.73 square meters)  Note: GFR calculation is accurate only with a steady state creatinine    Lipase [[id number]]  (Normal) Collected: 12/09/24 1646    Lab Status: Final result Specimen: Blood from Arm, Left Updated: 12/09/24 1711     Lipase 29 u/L     Urine Macroscopic, POC [[id number]] Collected: 12/09/24 1648    Lab Status: Final result Specimen: Urine Updated: 12/09/24 1650     Color, UA Yellow     Clarity, UA Clear     pH, UA 5.5     Leukocytes, UA Negative     Nitrite, UA Negative     Protein, UA Negative mg/dl      Glucose, UA Negative mg/dl      Ketones, UA Negative mg/dl      Urobilinogen, UA 0.2 E.U./dl      Bilirubin, UA Negative     Occult Blood, UA Negative     Specific Gravity, UA 1.025    Narrative:      CLINITEK RESULT            XR chest 2 views   Final Interpretation by Myesha Francis MD (12/09 1740)      No acute cardiopulmonary disease.            Workstation performed: [id number]             Procedures    ED Medication and Procedure Management   Prior to Admission Medications   Prescriptions Last Dose Informant Patient Reported? Taking?   Cetirizine HCl 10 MG CAPS  Self Yes No   Sig: Take 1 capsule by mouth daily   Cholecalciferol (VITAMIN D PO)  Self Yes No   Sig: Take 5,000 Units by mouth daily   EPINEPHrine (EPIPEN) 0.3  mg/0.3 mL SOAJ  Self No No   Sig: Inject 0.3 mg into outer thigh prn anaphylaxis   Lutein 10 MG TABS  Self Yes No   Sig: Take 5 mg by mouth daily   Omega-3 Fatty Acids (fish oil) 1,000 mg  Self Yes No   Sig: Use   albuterol (2.5 mg/3 mL) 0.083 % nebulizer solution  Self No No   Sig: Take 3 mL (2.5 mg total) by nebulization every 6 (six) hours as needed for wheezing or shortness of breath   budesonide (PULMICORT) 90 MCG/ACT inhaler  Self Yes No   Sig: Inhale 1 puff daily   levothyroxine 50 mcg tablet  Self No No   Sig: Take 1 tablet (50 mcg total) by mouth daily   mometasone (NASONEX) 50 mcg/act nasal spray  Self Yes No   Sig: SHAKE LQ AND U 1 SPR IEN IN THE MORNING   omeprazole (PriLOSEC) 10 mg delayed release capsule  Self Yes No   Sig: Take 10 mg by mouth daily   promethazine-dextromethorphan (PHENERGAN-DM) 6.25-15 mg/5 mL oral syrup   No No   Sig: Take 5 mL by mouth 4 (four) times a day as needed for cough      Facility-Administered Medications: None     Discharge Medication List as of 12/9/2024  7:03 PM        START taking these medications    Details   benzonatate (TESSALON PERLES) 100 mg capsule Take 1 capsule (100 mg total) by mouth every 8 (eight) hours, Starting Mon 12/9/2024, Normal      dicyclomine (BENTYL) 20 mg tablet Take 1 tablet (20 mg total) by mouth 2 (two) times a day, Starting Mon 12/9/2024, Normal      ondansetron (ZOFRAN-ODT) 4 mg disintegrating tablet Take 1 tablet (4 mg total) by mouth every 8 (eight) hours as needed for nausea or vomiting for up to 15 doses, Starting Mon 12/9/2024, Normal           CONTINUE these medications which have NOT CHANGED    Details   albuterol (2.5 mg/3 mL) 0.083 % nebulizer solution Take 3 mL (2.5 mg total) by nebulization every 6 (six) hours as needed for wheezing or shortness of breath, Starting Tue 5/21/2024, Normal      budesonide (PULMICORT) 90 MCG/ACT inhaler Inhale 1 puff daily, Historical Med      Cetirizine HCl 10 MG CAPS Take 1 capsule by mouth daily,  Historical Med      Cholecalciferol (VITAMIN D PO) Take 5,000 Units by mouth daily, Historical Med      EPINEPHrine (EPIPEN) 0.3 mg/0.3 mL SOAJ Inject 0.3 mg into outer thigh prn anaphylaxis, Normal      levothyroxine 50 mcg tablet Take 1 tablet (50 mcg total) by mouth daily, Starting Wed 8/7/2024, Normal      Lutein 10 MG TABS Take 5 mg by mouth daily, Historical Med      mometasone (NASONEX) 50 mcg/act nasal spray SHAKE LQ AND U 1 SPR IEN IN THE MORNING, Historical Med      Omega-3 Fatty Acids (fish oil) 1,000 mg Use, Historical Med      omeprazole (PriLOSEC) 10 mg delayed release capsule Take 10 mg by mouth daily, Historical Med      promethazine-dextromethorphan (PHENERGAN-DM) 6.25-15 mg/5 mL oral syrup Take 5 mL by mouth 4 (four) times a day as needed for cough, Starting Sat 12/7/2024, Print           No discharge procedures on file.  ED SEPSIS DOCUMENTATION   Time reflects when diagnosis was documented in both MDM as applicable and the Disposition within this note       Time User Action Codes Description Comment    12/9/2024  6:59 PM Irina Hillman Add [R11.2] Nausea and vomiting     12/9/2024  6:59 PM Irina Hillman Add [R19.7] Diarrhea     12/9/2024  6:59 PM Irina Hillman Add [R10.9] Abdominal pain     12/9/2024  6:59 PM Irina Hillman Add [R05.9] Cough                  Irina Hillman,   12/09/24 2122

## 2024-12-09 NOTE — Clinical Note
Jaida Murphy was seen and treated in our emergency department on 12/9/2024.                Diagnosis:     Jaida  may return to work on return date.    She may return on this date: 12/12/2024         If you have any questions or concerns, please don't hesitate to call.      Irina Hillman, DO    ______________________________           _______________          _______________  Hospital Representative                              Date                                Time

## 2025-02-17 DIAGNOSIS — E03.9 HYPOTHYROIDISM, UNSPECIFIED TYPE: ICD-10-CM

## 2025-02-18 RX ORDER — LEVOTHYROXINE SODIUM 50 UG/1
50 TABLET ORAL DAILY
Qty: 90 TABLET | Refills: 1 | Status: SHIPPED | OUTPATIENT
Start: 2025-02-18

## 2025-03-17 ENCOUNTER — NURSE TRIAGE (OUTPATIENT)
Age: 65
End: 2025-03-17

## 2025-03-17 NOTE — TELEPHONE ENCOUNTER
"FOLLOW UP: 1st available appt provided with 5/12/2025 at 9;15 with Diana in [place]. Added to wait list    REASON FOR CONVERSATION: Pelvic Pain    SYMPTOMS: pelvic fullness, vaginal pain when sitting on hard surface    OTHER: denies pain intercourse, denies vaginal bulge or vagina bleeding.     DISPOSITION: See PCP Within 2 Weeks        Reason for Disposition   [1] Pelvic pain is a chronic symptom (recurrent or ongoing AND [2] present > 4 weeks)     Onset 4 days ago-    Answer Assessment - Initial Assessment Questions  1. LOCATION: \"Where does it hurt?\"       Pelvic region  2. RADIATION: \"Does the pain shoot anywhere else?\" (e.g., lower back, groin, thighs)      Radiates into vagina  3. ONSET: \"When did the pain begin?\" (e.g., minutes, hours or days ago)       friday  4. SUDDEN: \"Gradual or sudden onset?\"      suddenly  5. PATTERN \"Does the pain come and go, or is it constant?\"      Intermittent only feeling pain when sitting on a hard surface  6. SEVERITY: \"How bad is the pain?\"  (e.g., Scale 1-10; mild, moderate, or severe)      Moderate discomfort  7. RECURRENT SYMPTOM: \"Have you ever had this type of pelvic pain before?\" If Yes, ask: \"When was the last time?\" and \"What happened that time?\"       never  8. CAUSE: \"What do you think is causing the pelvic pain?\"      Unsure, feels like something is going to drop out.  9. RELIEVING/AGGRAVATING FACTORS: \"What makes it better or worse?\" (e.g., activity/rest, sexual intercourse, voiding, passing stool)      Standing, lying down, sitting on soft surfac3  10. OTHER SYMPTOMS: \"Has there been any other symptoms?\" (e.g., fever, constipation, diarrhea, urine problems, vaginal bleeding, vaginal discharge, or vomiting?\"        States had colon issues last week. Felt like things were moving inside. Relieved with BM> Denies diarrhea, n/v or fever  11. PREGNANCY: \"Is there any chance you are pregnant?\" \"When was your last menstrual period?\"        Post menopausal    Protocols " used: Pelvic Pain - Female-Adult-AH

## 2025-03-17 NOTE — TELEPHONE ENCOUNTER
Regarding: feeling discomfort in pelvic area  ----- Message from Lupe POWELL sent at 3/17/2025  4:42 PM EDT -----  Patient is experiencing discomfort and fullness in her pelvis area when she sits down on harder surfaces.

## 2025-04-03 ENCOUNTER — APPOINTMENT (OUTPATIENT)
Dept: RADIOLOGY | Facility: CLINIC | Age: 65
End: 2025-04-03
Payer: COMMERCIAL

## 2025-04-03 ENCOUNTER — OFFICE VISIT (OUTPATIENT)
Dept: URGENT CARE | Facility: CLINIC | Age: 65
End: 2025-04-03
Payer: COMMERCIAL

## 2025-04-03 VITALS
SYSTOLIC BLOOD PRESSURE: 132 MMHG | TEMPERATURE: 97.6 F | OXYGEN SATURATION: 98 % | WEIGHT: 185 LBS | HEART RATE: 88 BPM | RESPIRATION RATE: 16 BRPM | BODY MASS INDEX: 28.98 KG/M2 | DIASTOLIC BLOOD PRESSURE: 84 MMHG

## 2025-04-03 DIAGNOSIS — M54.6 ACUTE BILATERAL THORACIC BACK PAIN: Primary | ICD-10-CM

## 2025-04-03 DIAGNOSIS — M54.6 ACUTE BILATERAL THORACIC BACK PAIN: ICD-10-CM

## 2025-04-03 PROCEDURE — 96372 THER/PROPH/DIAG INJ SC/IM: CPT | Performed by: NURSE PRACTITIONER

## 2025-04-03 PROCEDURE — S9083 URGENT CARE CENTER GLOBAL: HCPCS | Performed by: NURSE PRACTITIONER

## 2025-04-03 PROCEDURE — G0383 LEV 4 HOSP TYPE B ED VISIT: HCPCS | Performed by: NURSE PRACTITIONER

## 2025-04-03 PROCEDURE — 71046 X-RAY EXAM CHEST 2 VIEWS: CPT

## 2025-04-03 RX ORDER — KETOROLAC TROMETHAMINE 30 MG/ML
30 INJECTION, SOLUTION INTRAMUSCULAR; INTRAVENOUS ONCE
Status: COMPLETED | OUTPATIENT
Start: 2025-04-03 | End: 2025-04-03

## 2025-04-03 RX ORDER — METHOCARBAMOL 500 MG/1
500 TABLET, FILM COATED ORAL 3 TIMES DAILY
Qty: 20 TABLET | Refills: 0 | Status: SHIPPED | OUTPATIENT
Start: 2025-04-03

## 2025-04-03 RX ADMIN — KETOROLAC TROMETHAMINE 30 MG: 30 INJECTION, SOLUTION INTRAMUSCULAR; INTRAVENOUS at 09:44

## 2025-04-03 NOTE — PROGRESS NOTES
[facility]        NAME: Jaida Murphy is a 64 y.o. female  : 1960    MRN: [id number]  DATE: April 3, 2025  TIME: 9:20 AM    Assessment and Plan   Acute bilateral thoracic back pain [M54.6]  1. Acute bilateral thoracic back pain  XR chest pa and lateral        X-ray done in office.  Images reviewed by myself.  No evidence of acute processes noted.  Toradol given in office along with a prescription for Robaxin.  Give take parameters on when to return to emergency room for care and possibly advanced imaging.  Patient and  in agreement with plan.  Declines work note.    Patient Instructions     Follow up with PCP in 3-5 days.  Proceed to  ER if symptoms worsen.    Chief Complaint     Chief Complaint   Patient presents with    Back Pain     Started suddenly while in the car this morning. MID back pain, across the back. Feels like pressure. Denies injury. Denies any back surgery, has hx of spinal stenosis & BL hip replacement.          History of Present Illness   Jaida Murphy presents to the clinic c/o    Patient states she was driving to work which is near her home when she felt sudden pressure like pain in her back region.  She states she feels pain with twisting of her torso however does not have any pain with taking a deep breath or by moving her neck.  She states pain is bilateral in the back region and not central in location.  She has a history of spinal stenosis but nothing in that area.  She has not taken any Tylenol or Advil.  She states she went home and came right to our office for an evaluation.  She initially thought when she was able to get out of the car and stretch and stand up that the pain would resolve however it did not.    Back Pain        Review of Systems   Review of Systems   Musculoskeletal:  Positive for back pain.   All other systems reviewed and are negative.        Current Medications     Long-Term Medications   Medication Sig Dispense Refill     budesonide (PULMICORT) 90 MCG/ACT inhaler Inhale 1 puff daily      Cetirizine HCl 10 MG CAPS Take 1 capsule by mouth daily      dicyclomine (BENTYL) 20 mg tablet Take 1 tablet (20 mg total) by mouth 2 (two) times a day 20 tablet 0    EPINEPHrine (EPIPEN) 0.3 mg/0.3 mL SOAJ Inject 0.3 mg into outer thigh prn anaphylaxis 2 each 1    levothyroxine 50 mcg tablet TAKE 1 TABLET BY MOUTH EVERY DAY 90 tablet 1    mometasone (NASONEX) 50 mcg/act nasal spray SHAKE LQ AND U 1 SPR IEN IN THE MORNING  11    Omega-3 Fatty Acids (fish oil) 1,000 mg Use      omeprazole (PriLOSEC) 10 mg delayed release capsule Take 10 mg by mouth daily      ondansetron (ZOFRAN-ODT) 4 mg disintegrating tablet Take 1 tablet (4 mg total) by mouth every 8 (eight) hours as needed for nausea or vomiting for up to 15 doses 15 tablet 0    [DISCONTINUED] cholecalciferol (VITAMIN D3) 1,000 units tablet Take 1 tablet by mouth daily         Current Allergies     Allergies as of 04/03/2025 - Reviewed 04/03/2025   Allergen Reaction Noted    Sulfa antibiotics Anaphylaxis and Hives 07/29/2013    Cefazolin  09/23/2013    Other Hives 08/28/2023    Penicillins  07/29/2013            The following portions of the patient's history were reviewed and updated as appropriate: allergies, current medications, past family history, past medical history, past social history, past surgical history and problem list.    Objective   /84   Pulse 88   Temp 97.6 °F (36.4 °C) (Tympanic)   Resp 16   Wt 83.9 kg (185 lb)   LMP  (LMP Unknown)   SpO2 98%   BMI 28.98 kg/m²        Physical Exam     Physical Exam  Vitals and nursing note reviewed.   Constitutional:       Appearance: Normal appearance. She is well-developed.   HENT:      Head: Normocephalic and atraumatic.      Right Ear: Hearing, tympanic membrane, ear canal and external ear normal.      Left Ear: Hearing, tympanic membrane, ear canal and external ear normal.      Nose: Nose normal.      Mouth/Throat:      Lips:  Pink.      Mouth: Mucous membranes are moist.      Pharynx: Oropharynx is clear.   Eyes:      General: Lids are normal.      Conjunctiva/sclera: Conjunctivae normal.      Pupils: Pupils are equal, round, and reactive to light.   Cardiovascular:      Rate and Rhythm: Normal rate and regular rhythm.      Heart sounds: Normal heart sounds, S1 normal and S2 normal.   Pulmonary:      Effort: Pulmonary effort is normal.      Breath sounds: Normal breath sounds.   Abdominal:      General: Abdomen is flat. Bowel sounds are normal.      Palpations: Abdomen is soft.   Musculoskeletal:         General: Normal range of motion.      Cervical back: Full passive range of motion without pain, normal range of motion and neck supple.      Comments: Unable to produce pain on exam   Skin:     General: Skin is warm and dry.   Neurological:      General: No focal deficit present.      Mental Status: She is alert and oriented to person, place, and time.   Psychiatric:         Mood and Affect: Mood normal.         Speech: Speech normal.         Behavior: Behavior normal. Behavior is cooperative.         Thought Content: Thought content normal.         Judgment: Judgment normal.

## 2025-04-03 NOTE — PATIENT INSTRUCTIONS
"Patient Education     Upper back pain   The Basics   Written by the doctors and editors at [facility]   What is upper back pain? -- Upper back pain is felt anywhere from the base of the neck to the middle part of the back. This is called the \"thoracic\" spine (figure 1). It is the part of the back where the ribs connect to the spine.  The upper back and ribs help keep the back stable and protect the organs in the chest. The upper back does not have as much movement as the neck or lower back.  With upper back pain, you might also have pain in your neck, arms, and shoulders.  What are the parts of the back? -- The back is made up of (figure 2):   Vertebrae - These are the bones of the spine. Each has a hole in the center. The vertebrae are stacked to form a hollow tube called the \"spinal canal.\" The spinal cord passes through this tube and is protected by the vertebrae.   Spinal cord and nerves - The spinal cord is the bundle of nerves that connects the brain to the rest of the body. It runs through the vertebrae. Nerves branch from the spinal cord and pass in between the vertebrae. From there, they connect to the arms, the legs, and the organs.   Discs - Rubbery discs sit in between each of the vertebrae. These add cushion and allow movement.   Muscles, tendons, and ligaments - These support the vertebrae. They are used to move the head and neck, stand upright, and bend and flex the body. They are also called the \"soft tissues\" of the neck and back.  What causes upper back pain? -- Many different things can cause upper back pain. Most of the time, doctors do not know the exact cause.  Upper back pain can happen if you strain a muscle. Sometimes, it starts suddenly after physical activity. For example, you might have pain after throwing, lifting something heavy, twisting, or bending over.  Upper back pain can also happen if you have:   Bad posture   Damaged, bulging, or torn discs   Arthritis affecting the joints of the " "spine   Bony growths on the vertebrae that press on nearby nerves   A vertebra or rib out of place   Narrowing in the spinal canal   An accident or injury   A problem with an organ in the upper abdomen or chest   A tumor or infection (but this is very rare)  How is upper back pain diagnosed? -- Your doctor or nurse will do an exam and ask about your \"health history.\" This involves asking you questions about any health problems you have or had in the past, past surgeries, and any medicines you take. They will see how you move and bend. They might also check your muscle strength and reflexes.  Most people do not need an imaging test like an X-ray, CT scan, or MRI. Most of the time, upper back pain goes away within a few weeks. Doctors usually do not order imaging tests unless there are signs of something unusual.  If your doctor does not order an imaging test, do not worry. They can still learn a lot about your pain just from looking you over and talking with you.  How is upper back pain treated? -- Most people with an episode of upper back pain do not have a serious medical problem and can try simple treatments. These include:   Staying active - Try to stay as active as possible without causing too much pain. People with mild back pain recover faster if they stay active and avoid bedrest. If your pain is severe, you might need to rest for a day or 2. But it's important to get back to walking and moving as soon as possible. You should avoid heavy lifting, twisting, reaching, and sports while your back hurts, but try to keep doing your normal daily activities.   Heat - Some people find that it helps to use a heating pad or heated wrap for short periods of time. Put a heating pad (on the low setting) on your upper back for 20 minutes at a time a few times each day. Be careful to avoid high heat settings to prevent skin burns.   Medicines - Take a medicine like ibuprofen (sample brand names: Advil, Motrin) or naproxen " "(brand name: Aleve) for pain, if needed. These are nonsteroidal anti-inflammatory drugs (\"NSAIDs\"). You can also take acetaminophen (sample brand name: Tylenol).   Treatments to help with symptoms - Some treatments might help you feel better for a little while. They include:   Spinal manipulation - This is when a chiropractor, physical therapist, or other professional moves or \"adjusts\" the joints of your back. If you want to try this, talk to your doctor or nurse first.   Acupuncture - This is when someone who knows traditional Chinese medicine inserts tiny needles into your body to block pain signals.   Massage - A massage therapist massages the muscles and other soft tissues in your back.   Physical therapy - In some cases, your doctor might suggest learning exercises or stretches to help with your pain.  Can upper back pain be prevented? -- It's important to stay active and stretch your muscles. Other tips to help prevent upper back pain include:   Use good posture.   Do not sit or  1 position for a long time.   Avoid wearing heavy backpacks or purses.   If you have a desk job, keep your computer at eye level and use a supportive chair.   If you wear a bra, make sure it has good support.   Use a supportive pillow when sleeping. Avoid sleeping on your stomach.  When should I call the doctor? -- Call for emergency help right away (in the [place] and [place], call 9-1-1) if:   You have trouble breathing.   Your pain becomes severe.   You develop new weakness in 1 or both arms, or you cannot move your arm.  Call your doctor for advice if:   Your arms are numb, weak, or tingly.   You have a fever of 100.4°F (38°C) or higher or chills, or are coughing up mucus that looks green or yellow.   Your pain is getting worse, even with medicines and rest.   You cannot do your daily activities or sleep because of the pain.  All topics are updated as new evidence becomes available and our peer review process is complete.  This " "topic retrieved from [employer] on: May 15, 2024.  Topic 607311 Version 2.0  Release: 32.4.3 - C32.134  © 2024 [employer], Inc[employer] and/or its affiliates. All rights reserved.  figure 1: Regions of the spine     Theback bones, or \"vertebrae,\" are divided into regions: cervical(neck), thoracic (upper back), lumbar (lower back), sacrum, and coccyx.  Graphic 217805 Version 1.0  figure 2: Anatomy of the back     This drawing shows the different parts of the back. Back pain can be caused by problems with the muscles, ligaments, discs, bones (vertebrae), or nerves.  Graphic 57079 Version 6.0  Consumer Information Use and Disclaimer   Disclaimer: This generalized information is a limited summary of diagnosis, treatment, and/or medication information. It is not meant to be comprehensive and should be used as a tool to help the user understand and/or assess potential diagnostic and treatment options. It does NOT include all information about conditions, treatments, medications, side effects, or risks that may apply to a specific patient. It is not intended to be medical advice or a substitute for the medical advice, diagnosis, or treatment of a health care provider based on the health care provider's examination and assessment of a patient's specific and unique circumstances. Patients must speak with a health care provider for complete information about their health, medical questions, and treatment options, including any risks or benefits regarding use of medications. This information does not endorse any treatments or medications as safe, effective, or approved for treating a specific patient. [employer], Inc[employer] and its affiliates disclaim any warranty or liability relating to this information or the use thereof.The use of this information is governed by the Terms of Use, available at https://www.woltersThe Switchuwer.com/en/know/clinical-effectiveness-terms. 2024© [employer], Inc. and its affiliates and/or licensors. All rights " reserved.  Copyright   © 2024 [employer] and/or its affiliates. All rights reserved.

## 2025-05-19 ENCOUNTER — HOSPITAL ENCOUNTER (OUTPATIENT)
Dept: RADIOLOGY | Age: 65
Discharge: HOME | End: 2025-05-19
Payer: COMMERCIAL

## 2025-05-19 VITALS — HEIGHT: 67 IN | WEIGHT: 185 LBS | BODY MASS INDEX: 29.03 KG/M2

## 2025-05-19 DIAGNOSIS — Z91.89 INCREASED RISK OF BREAST CANCER: ICD-10-CM

## 2025-05-19 PROCEDURE — 76641 ULTRASOUND BREAST COMPLETE: CPT

## 2025-08-21 DIAGNOSIS — E03.9 HYPOTHYROIDISM, UNSPECIFIED TYPE: ICD-10-CM

## 2025-08-22 RX ORDER — LEVOTHYROXINE SODIUM 50 UG/1
50 TABLET ORAL DAILY
Qty: 90 TABLET | Refills: 0 | Status: SHIPPED | OUTPATIENT
Start: 2025-08-22